# Patient Record
Sex: FEMALE | Race: BLACK OR AFRICAN AMERICAN | NOT HISPANIC OR LATINO | Employment: OTHER | URBAN - METROPOLITAN AREA
[De-identification: names, ages, dates, MRNs, and addresses within clinical notes are randomized per-mention and may not be internally consistent; named-entity substitution may affect disease eponyms.]

---

## 2017-01-19 ENCOUNTER — OFFICE VISIT (OUTPATIENT)
Dept: HEMATOLOGY/ONCOLOGY | Facility: CLINIC | Age: 77
End: 2017-01-19
Payer: MEDICARE

## 2017-01-19 VITALS
BODY MASS INDEX: 29.06 KG/M2 | HEART RATE: 75 BPM | WEIGHT: 170.19 LBS | DIASTOLIC BLOOD PRESSURE: 62 MMHG | TEMPERATURE: 101 F | SYSTOLIC BLOOD PRESSURE: 127 MMHG | HEIGHT: 64 IN | RESPIRATION RATE: 18 BRPM

## 2017-01-19 DIAGNOSIS — C50.311 MALIGNANT NEOPLASM OF LOWER-INNER QUADRANT OF RIGHT FEMALE BREAST: Primary | ICD-10-CM

## 2017-01-19 PROCEDURE — 99214 OFFICE O/P EST MOD 30 MIN: CPT | Mod: S$PBB,,, | Performed by: PHYSICIAN ASSISTANT

## 2017-01-19 PROCEDURE — 99213 OFFICE O/P EST LOW 20 MIN: CPT | Mod: PBBFAC | Performed by: PHYSICIAN ASSISTANT

## 2017-01-19 PROCEDURE — 99999 PR PBB SHADOW E&M-EST. PATIENT-LVL III: CPT | Mod: PBBFAC,,, | Performed by: PHYSICIAN ASSISTANT

## 2017-01-19 NOTE — PROGRESS NOTES
Subjective:       Patient ID: Whit Sloan is a 76 y.o. female.    Chief Complaint: No chief complaint on file.    HPI Comments: Dr. Salinas's patient, 76 year old female  right breast cancer    Stage IA (pT1c sn pN0(i-) pMX)    Started on adjuvant Tamoxifen 9/2016.    Patient feeling quite well. NO fever, chills, nausea, vomiting or shortness of breath. She has some very mild hot flashes that do not disturb quality of life. No breast pain or complaints.   Stays active with walking dog and household activities. Emotionally doing well and denies depression or anxiety.    Bilateral mammogram from 12/12/16:  Post-surgical scar in the right breast is benign-negative.  Mammogram in 1 year is recommended.  ACR BI-RADS Category 2: Benign        Oncology History:  - 4/6/15 underwent routine mammogram and a focal asymmetric density in the right breast requiring additional evaluation seen.  Breast MRI was initially recommended and read as BI-RADS Category 0: Incomplete needing additional imaging   This was addended on 04/09/2015 and upon further review, focal asymmetric density in the right breast was read ads probably  benign. Follow-up in 6 months is recommended.  - 10/22/15 she underwent follow-up diagnostic mammogram which revealed stable focal asymmetry in the right breast is benign-negative.  Follow-up in 6  months is recommended.    ACR BI-RADS Category 2: Benign  - On 5/9/16 she underwent breast ultrasound  Stable focal asymmetry in the right breast is suspicious.  Histology using core biopsy was recommended.  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      We reviewed Tamoxifen versus aromatase inhibitors and carefully reviewed mechanism of action and side effects for both classes of drugs  We will proceed with Tamoxifen per her preference  RTC 4-6 weeks - knows to call for any issues    Review of Systems   Constitutional: Negative.    HENT: Negative for congestion, dental problem, rhinorrhea, sore throat and trouble swallowing.    Eyes: Negative for visual disturbance.   Respiratory: Negative for cough, chest tightness and shortness of breath.    Cardiovascular: Negative for chest pain, palpitations and leg swelling.        Some ankle swelling at the end of the day   Gastrointestinal: Negative for abdominal pain, blood in stool, constipation, diarrhea, nausea and vomiting.   Genitourinary: Negative for dysuria, hematuria and vaginal bleeding.   Musculoskeletal: Negative for arthralgias, back pain and myalgias.   Skin: Negative for pallor and rash.   Neurological: Negative for dizziness, weakness and headaches.   Hematological: Negative for adenopathy. Does not bruise/bleed easily.   Psychiatric/Behavioral: Negative for dysphoric mood and suicidal ideas. The patient is not nervous/anxious.        Objective:      Physical Exam   Constitutional: She is oriented to person, place, and time. She appears well-developed and well-nourished. No distress.   ECOG 0   HENT:   Head: Normocephalic.   Mouth/Throat: Oropharynx is clear and moist. No oropharyngeal exudate.   Eyes: Conjunctivae are normal. Pupils are equal, round, and reactive to light. No scleral icterus.   Neck: Normal range of motion. Neck supple. No thyromegaly present.   Cardiovascular: Normal rate and regular rhythm.    Pulmonary/Chest: Effort normal and breath sounds normal. No respiratory distress.   Right breast with well healed lumpectomy incision. Some mild hyperpigmentation of the central breast due to radiation. No mass or nodule in either right or  left breast. No supraclavicular or axillary adenopathy.   Abdominal: Soft. Bowel sounds are normal. She exhibits no distension and no mass. There is no tenderness.   Musculoskeletal: Normal range of motion. She exhibits no edema or tenderness.   No spinal or paraspinal tenderness to palpation     Lymphadenopathy:     She has no cervical adenopathy.   Neurological: She is alert and oriented to person, place, and time. No cranial nerve deficit.   Skin: Skin is warm and dry.   Psychiatric: She has a normal mood and affect. Her behavior is normal. Judgment and thought content normal.   Vitals reviewed.      Assessment:       1. Malignant neoplasm of lower-inner quadrant of right female breast        Plan:       Patient will continue Tamoxifen and return to clinic in 3 months.    We discussed the role of the survivorship clinic in her care.  Today we reviewed all aspects of treatment and the potential long term side effects of treatment. We also discussed the emotional/psychological impact of diagnosis and treatment and I let her know about our psychosocial services (dedicated  and Psychologist).  She declines referral to these services at this time.       Annual mammogram due 12/2017    Written material on eating well, exercise, fear of recurrence, living with uncertainty and sadness depression provided to patient.     Please see Summary Treatment and Care plan filed under same date.      .

## 2017-01-19 NOTE — LETTER
January 19, 2017      Jennifer Salinas MD  [address]  [facility] [address]           [facility]  [address]  [facility] [address]  Phone: [phone]          Patient: Whit Sloan   MR Number: [id number]   YOB: 1940   Date of Visit: 1/19/2017       Dear Dr. Jennifer Salinas:    Thank you for referring Whit Sloan to me for evaluation. Attached you will find relevant portions of my assessment and plan of care.    If you have questions, please do not hesitate to call me. I look forward to following Whit Sloan along with you.    Sincerely,    Rosemary Beasley PA-C    Enclosure  CC:  No Recipients    If you would like to receive this communication electronically, please contact [email or web address] or [phone] to request more information on [employer] access.    For providers and/or their staff who would like to refer a patient to [facility], please contact us through our one-stop-shop provider referral line, [facility] , at [phone].    If you feel you have received this communication in error or would no longer like to receive these types of communications, please e-mail [email or web address]

## 2017-02-07 ENCOUNTER — LAB VISIT (OUTPATIENT)
Dept: LAB | Facility: HOSPITAL | Age: 77
End: 2017-02-07
Attending: FAMILY MEDICINE
Payer: MEDICARE

## 2017-02-07 DIAGNOSIS — E11.69 DM TYPE 2 WITH DIABETIC DYSLIPIDEMIA: ICD-10-CM

## 2017-02-07 DIAGNOSIS — E78.5 DM TYPE 2 WITH DIABETIC DYSLIPIDEMIA: ICD-10-CM

## 2017-02-07 LAB
ALBUMIN SERPL BCP-MCNC: 3.2 G/DL
ALP SERPL-CCNC: 60 U/L
ALT SERPL W/O P-5'-P-CCNC: 9 U/L
ANION GAP SERPL CALC-SCNC: 7 MMOL/L
AST SERPL-CCNC: 20 U/L
BILIRUB SERPL-MCNC: 0.5 MG/DL
BUN SERPL-MCNC: 37 MG/DL
CALCIUM SERPL-MCNC: 9 MG/DL
CHLORIDE SERPL-SCNC: 113 MMOL/L
CHOLEST/HDLC SERPL: 2.3 {RATIO}
CO2 SERPL-SCNC: 22 MMOL/L
CREAT SERPL-MCNC: 1.8 MG/DL
EST. GFR  (AFRICAN AMERICAN): 30.9 ML/MIN/1.73 M^2
EST. GFR  (NON AFRICAN AMERICAN): 26.8 ML/MIN/1.73 M^2
ESTIMATED AVG GLUCOSE: 151 MG/DL
GLUCOSE SERPL-MCNC: 73 MG/DL
HBA1C MFR BLD HPLC: 6.9 %
HDL/CHOLESTEROL RATIO: 43.1 %
HDLC SERPL-MCNC: 144 MG/DL
HDLC SERPL-MCNC: 62 MG/DL
LDLC SERPL CALC-MCNC: 72 MG/DL
NONHDLC SERPL-MCNC: 82 MG/DL
POTASSIUM SERPL-SCNC: 4.6 MMOL/L
PROT SERPL-MCNC: 7 G/DL
SODIUM SERPL-SCNC: 142 MMOL/L
TRIGL SERPL-MCNC: 50 MG/DL

## 2017-02-07 PROCEDURE — 80053 COMPREHEN METABOLIC PANEL: CPT

## 2017-02-07 PROCEDURE — 36415 COLL VENOUS BLD VENIPUNCTURE: CPT | Mod: PO

## 2017-02-07 PROCEDURE — 80061 LIPID PANEL: CPT

## 2017-02-07 PROCEDURE — 83036 HEMOGLOBIN GLYCOSYLATED A1C: CPT

## 2017-02-11 RX ORDER — SIMVASTATIN 40 MG/1
TABLET, FILM COATED ORAL
Qty: 30 TABLET | Refills: 0 | Status: SHIPPED | OUTPATIENT
Start: 2017-02-11 | End: 2017-03-14 | Stop reason: SDUPTHER

## 2017-02-27 ENCOUNTER — OFFICE VISIT (OUTPATIENT)
Dept: FAMILY MEDICINE | Facility: CLINIC | Age: 77
End: 2017-02-27
Payer: MEDICARE

## 2017-02-27 VITALS
BODY MASS INDEX: 29.06 KG/M2 | SYSTOLIC BLOOD PRESSURE: 110 MMHG | HEIGHT: 64 IN | WEIGHT: 170.19 LBS | DIASTOLIC BLOOD PRESSURE: 60 MMHG | HEART RATE: 64 BPM

## 2017-02-27 DIAGNOSIS — N18.30 HYPERTENSIVE KIDNEY DISEASE WITH CHRONIC KIDNEY DISEASE STAGE III: Primary | ICD-10-CM

## 2017-02-27 DIAGNOSIS — M85.80 OSTEOPENIA: ICD-10-CM

## 2017-02-27 DIAGNOSIS — C50.311 MALIGNANT NEOPLASM OF LOWER-INNER QUADRANT OF RIGHT FEMALE BREAST: ICD-10-CM

## 2017-02-27 DIAGNOSIS — F32.0 MILD SINGLE CURRENT EPISODE OF MAJOR DEPRESSIVE DISORDER: ICD-10-CM

## 2017-02-27 DIAGNOSIS — E11.22 TYPE 2 DIABETES MELLITUS WITH STAGE 3 CHRONIC KIDNEY DISEASE, WITHOUT LONG-TERM CURRENT USE OF INSULIN: ICD-10-CM

## 2017-02-27 DIAGNOSIS — I12.9 HYPERTENSIVE KIDNEY DISEASE WITH CHRONIC KIDNEY DISEASE STAGE III: Primary | ICD-10-CM

## 2017-02-27 DIAGNOSIS — N18.30 TYPE 2 DIABETES MELLITUS WITH STAGE 3 CHRONIC KIDNEY DISEASE, WITHOUT LONG-TERM CURRENT USE OF INSULIN: ICD-10-CM

## 2017-02-27 DIAGNOSIS — M89.9 DISORDER OF BONE: ICD-10-CM

## 2017-02-27 DIAGNOSIS — E11.69 DM TYPE 2 WITH DIABETIC DYSLIPIDEMIA: ICD-10-CM

## 2017-02-27 DIAGNOSIS — E78.5 DM TYPE 2 WITH DIABETIC DYSLIPIDEMIA: ICD-10-CM

## 2017-02-27 PROCEDURE — 99214 OFFICE O/P EST MOD 30 MIN: CPT | Mod: S$PBB,,, | Performed by: FAMILY MEDICINE

## 2017-02-27 PROCEDURE — 99999 PR PBB SHADOW E&M-EST. PATIENT-LVL III: CPT | Mod: PBBFAC,,, | Performed by: FAMILY MEDICINE

## 2017-02-27 PROCEDURE — 99213 OFFICE O/P EST LOW 20 MIN: CPT | Mod: PBBFAC,PO | Performed by: FAMILY MEDICINE

## 2017-02-27 RX ORDER — TAMOXIFEN CITRATE 20 MG/1
TABLET ORAL
Refills: 5 | COMMUNITY
Start: 2017-02-19 | End: 2017-02-27

## 2017-02-27 NOTE — MR AVS SNAPSHOT
[facility]   [address]  [place] [address]  Phone: [phone]  Fax: [phone]                  Whit Sloan   2017 2:20 PM   Office Visit    Description:  Female : 1940   Provider:  Ingrid Meyers MD   Department:  [facility]           Reason for Visit     Follow-up     Hypertension     Chronic Kidney Disease     Hyperlipidemia     Diabetes           Diagnoses this Visit        Comments    Hypertensive kidney disease with chronic kidney disease stage III    -  Primary     Type 2 diabetes mellitus with stage 3 chronic kidney disease, without long-term current use of insulin         DM type 2 with diabetic dyslipidemia         Malignant neoplasm of lower-inner quadrant of right female breast         Mild single current episode of major depressive disorder         BMI 29.0-29.9,adult         Osteopenia         Disorder of bone                To Do List           Future Appointments        Provider Department Dept Phone    3/13/2017 11:00 AM METC, DEXA1 [facility] [phone]    2017 8:00 AM JENN, KENNER [facility] [phone]    2017 2:20 PM Ingrid Meyers MD [facility] [phone]      Goals (5 Years of Data)     None      Follow-Up and Disposition     Return in about 4 months (around 2017).      [facility]     [facility] Nurse Care Line - 24/7 Assistance  Registered nurses in the [facility] provide clinical advisement, health education, appointment booking, and other advisory services.  Call for this free service at [phone].             Medications           STOP taking these medications     denosumab (PROLIA) 60 mg/mL Syrg Inject 60 mg into the skin every 6 (six) months.    latanoprost 0.005 % ophthalmic solution Place 1 drop into both eyes once daily.            Verify that the below list of medications is an accurate representation of the  medications you are currently taking.  If none reported, the list may be blank. If incorrect, please contact your healthcare provider. Carry this list with you in case of emergency.           Current Medications     calcitRIOL (ROCALTROL) 0.5 MCG Cap Take 0.5 mcg by mouth once daily.     fluoxetine (PROZAC) 10 MG capsule TAKE 1 CAPSULE BY MOUTH EVERY DAY    furosemide (LASIX) 20 MG tablet Take 20 mg by mouth once daily.      glipiZIDE (GLUCOTROL) 10 MG tablet Take 1 tablet (10 mg total) by mouth 2 (two) times daily before meals.    hydrocodone-acetaminophen 5-325mg (NORCO) 5-325 mg per tablet Take 1 tablet by mouth every 6 (six) hours as needed for Pain.    JANUVIA 50 mg Tab TAKE 1 TABLET BY MOUTH EVERY DAY    losartan (COZAAR) 100 MG tablet TAKE 1 TABLET BY MOUTH EVERY DAY    pioglitazone (ACTOS) 45 MG tablet TAKE 1 TABLET BY MOUTH EVERY DAY    potassium citrate (UROCIT-K) 10 mEq TbSR Take 10 mEq by mouth 2 (two) times daily with meals.     senna-docusate 8.6-50 mg (PERICOLACE) 8.6-50 mg per tablet Take 1 tablet by mouth 2 (two) times daily.    simvastatin (ZOCOR) 40 MG tablet TAKE 1 TABLET BY MOUTH EVERY EVENING    tamoxifen (NOLVADEX) 20 MG Tab Take 1 tablet (20 mg total) by mouth once daily.           Clinical Reference Information           Your Vitals Were     BP                   110/60           Blood Pressure          Most Recent Value    BP  110/60      Allergies as of 2/27/2017     No Known Allergies      Immunizations Administered on Date of Encounter - 2/27/2017     None      Orders Placed During Today's Visit     Future Labs/Procedures Expected by Expires    Comprehensive metabolic panel  2/27/2017 2/27/2018    DXA Bone Density Spine And Hip_Axial Skeleton  2/27/2017 5/28/2017    Hemoglobin A1c  2/27/2017 2/27/2018      [facility] Sign-Up     Activating your [facility] account is as easy as 1-2-3!     1) Visit [email or web address], select Sign Up Now, enter this activation code and your date of birth, then  select Next.  [email or web address]  Expires: 4/13/2017  3:05 PM      2) Create a username and password to use when you visit [facility] in the future and select a security question in case you lose your password and select Next.    3) Enter your e-mail address and click Sign Up!    Additional Information  If you have questions, please e-mail [employer] [email or web address] or call [phone] to talk to our [facility] staff. Remember, [facility] is NOT to be used for urgent needs. For medical emergencies, dial 911.         Language Assistance Services     ATTENTION: Language assistance services are available, free of charge. Please call [phone].      ATENCIÓN: Si mallorie andres, tiene a baxter disposición servicios gratuitos de asistencia lingüística. Llame al [phone].     GAURAV Ý: N?u b?n nói Ti?ng Vi?t, có các d?ch v? h? tr? ngôn ng? mi?n phí dành cho b?n. G?i s? [phone].         [facility] complies with applicable Federal civil rights laws and does not discriminate on the basis of race, color, national origin, age, disability, or sex.

## 2017-02-27 NOTE — PROGRESS NOTES
Subjective:       Patient ID: Whit Sloan is a 76 y.o. female.    Chief Complaint: Follow-up; Hypertension; Chronic Kidney Disease; Hyperlipidemia; and Diabetes    Hypertension   This is a chronic problem. The current episode started more than 1 year ago. The problem is unchanged. The problem is controlled. Associated symptoms include peripheral edema. Pertinent negatives include no blurred vision, chest pain, headaches, orthopnea, palpitations, PND or shortness of breath. Past treatments include angiotensin blockers and diuretics. The current treatment provides significant improvement. There are no compliance problems.  Hypertensive end-organ damage includes kidney disease. Identifiable causes of hypertension include chronic renal disease.   Hyperlipidemia   This is a chronic problem. The current episode started more than 1 year ago. The problem is controlled. Recent lipid tests were reviewed and are normal. Exacerbating diseases include chronic renal disease and diabetes. She has no history of hypothyroidism, liver disease, obesity or nephrotic syndrome. Pertinent negatives include no chest pain, focal sensory loss, focal weakness, leg pain, myalgias or shortness of breath. Current antihyperlipidemic treatment includes statins. The current treatment provides significant improvement of lipids. There are no compliance problems.    Diabetes   She presents for her follow-up diabetic visit. She has type 2 diabetes mellitus. There are no hypoglycemic associated symptoms. Pertinent negatives for hypoglycemia include no headaches or nervousness/anxiousness. Pertinent negatives for diabetes include no blurred vision, no chest pain, no fatigue, no foot paresthesias, no foot ulcerations, no polydipsia, no polyphagia, no polyuria, no visual change, no weakness and no weight loss. There are no hypoglycemic complications. Diabetic complications include nephropathy. Current diabetic treatment includes oral agent (triple  therapy). She is compliant with treatment all of the time. She participates in exercise three times a week. An ACE inhibitor/angiotensin II receptor blocker is being taken. Eye exam is current (Had eye exam Jan 2017 with Dr. Min.).   Ias followed by Dr. Sprague for CKD, Stage 3. Next visit April 2017.  Is followed by Dr. Min for glaucoma. Last visit Jan 2017. No diabetic retinopathy.  Continues on Tamoxifen for breast cancer.  Results for orders placed or performed in visit on 02/07/17   Comprehensive metabolic panel   Result Value Ref Range    Sodium 142 136 - 145 mmol/L    Potassium 4.6 3.5 - 5.1 mmol/L    Chloride 113 (H) 95 - 110 mmol/L    CO2 22 (L) 23 - 29 mmol/L    Glucose 73 70 - 110 mg/dL    BUN, Bld 37 (H) 8 - 23 mg/dL    Creatinine 1.8 (H) 0.5 - 1.4 mg/dL    Calcium 9.0 8.7 - 10.5 mg/dL    Total Protein 7.0 6.0 - 8.4 g/dL    Albumin 3.2 (L) 3.5 - 5.2 g/dL    Total Bilirubin 0.5 0.1 - 1.0 mg/dL    Alkaline Phosphatase 60 55 - 135 U/L    AST 20 10 - 40 U/L    ALT 9 (L) 10 - 44 U/L    Anion Gap 7 (L) 8 - 16 mmol/L    eGFR if African American 30.9 (A) >60 mL/min/1.73 m^2    eGFR if non  26.8 (A) >60 mL/min/1.73 m^2   Hemoglobin A1c   Result Value Ref Range    Hemoglobin A1C 6.9 (H) 4.5 - 6.2 %    Estimated Avg Glucose 151 (H) 68 - 131 mg/dL   Lipid panel   Result Value Ref Range    Cholesterol 144 120 - 199 mg/dL    Triglycerides 50 30 - 150 mg/dL    HDL 62 40 - 75 mg/dL    LDL Cholesterol 72.0 63.0 - 159.0 mg/dL    HDL/Chol Ratio 43.1 20.0 - 50.0 %    Total Cholesterol/HDL Ratio 2.3 2.0 - 5.0    Non-HDL Cholesterol 82 mg/dL     Review of Systems   Constitutional: Negative for chills, fatigue, fever and weight loss.   Eyes: Negative for blurred vision.   Respiratory: Negative for shortness of breath.    Cardiovascular: Positive for leg swelling. Negative for chest pain, palpitations, orthopnea and PND.   Gastrointestinal: Negative for abdominal pain, anal bleeding, blood in stool, constipation,  diarrhea, nausea and vomiting.   Endocrine: Negative for polydipsia, polyphagia and polyuria.   Genitourinary: Negative for dysuria and menstrual problem.   Musculoskeletal: Negative for myalgias.   Neurological: Negative for focal weakness, weakness and headaches.   Psychiatric/Behavioral: Negative for dysphoric mood, self-injury, sleep disturbance and suicidal ideas. The patient is not nervous/anxious.        Objective:      Physical Exam   Constitutional: She is oriented to person, place, and time. She appears well-developed and well-nourished.   HENT:   Head: Normocephalic and atraumatic.   Neck: Normal range of motion. Neck supple. No JVD present. No thyromegaly present.   Cardiovascular: Normal rate, regular rhythm, normal heart sounds and intact distal pulses.    Pulses:       Dorsalis pedis pulses are 2+ on the right side, and 2+ on the left side.        Posterior tibial pulses are 2+ on the right side, and 2+ on the left side.   Pulmonary/Chest: Effort normal and breath sounds normal. She has no wheezes. She has no rales.   Abdominal: Soft. Bowel sounds are normal. She exhibits no mass. There is no tenderness.   Musculoskeletal:        Right foot: There is deformity. There is normal range of motion.        Left foot: There is deformity. There is normal range of motion.   Feet:   Right Foot:   Protective Sensation: 10 sites tested. 10 sites sensed.   Skin Integrity: Negative for ulcer, blister, skin breakdown, erythema, warmth, callus or dry skin.   Left Foot:   Protective Sensation: 10 sites tested. 10 sites sensed.   Skin Integrity: Negative for ulcer, blister, skin breakdown, erythema, warmth, callus or dry skin.   Lymphadenopathy:     She has no cervical adenopathy.   Neurological: She is alert and oriented to person, place, and time.   Skin: Skin is warm and dry.   Psychiatric: She has a normal mood and affect.   Vitals reviewed.      Assessment:         See plan  Plan:       Whit was seen today for  follow-up, hypertension, chronic kidney disease, hyperlipidemia and diabetes.    Diagnoses and all orders for this visit:    Hypertensive kidney disease with chronic kidney disease stage III: Controlled  - F/U with Dr. Sprague    Type 2 diabetes mellitus with stage 3 chronic kidney disease, without long-term current use of insulin: Controlled  -     Comprehensive metabolic panel; Future  -     Hemoglobin A1c; Future    DM type 2 with diabetic dyslipidemia: Controlled  -     Comprehensive metabolic panel; Future  -     Hemoglobin A1c; Future    Malignant neoplasm of lower-inner quadrant of right female breast  - Continue Tamoxifen    Mild single current episode of major depressive disorder: Controlled    BMI 29.0-29.9,adult  Weight loss advised. Dietary and exercise counseling done.    Osteopenia  -     DXA Bone Density Spine And Hip_Axial Skeleton; Future    Disorder of bone   -     DXA Bone Density Spine And Hip_Axial Skeleton; Future    F/U in 4 months.

## 2017-03-13 ENCOUNTER — APPOINTMENT (OUTPATIENT)
Dept: RADIOLOGY | Facility: CLINIC | Age: 77
End: 2017-03-13
Attending: FAMILY MEDICINE
Payer: MEDICARE

## 2017-03-13 DIAGNOSIS — M85.80 OSTEOPENIA: ICD-10-CM

## 2017-03-13 DIAGNOSIS — M89.9 DISORDER OF BONE: ICD-10-CM

## 2017-03-13 PROCEDURE — 77080 DXA BONE DENSITY AXIAL: CPT | Mod: 26,,, | Performed by: INTERNAL MEDICINE

## 2017-03-13 PROCEDURE — 77080 DXA BONE DENSITY AXIAL: CPT | Mod: TC

## 2017-03-14 RX ORDER — SIMVASTATIN 40 MG/1
TABLET, FILM COATED ORAL
Qty: 90 TABLET | Refills: 1 | Status: SHIPPED | OUTPATIENT
Start: 2017-03-14 | End: 2017-10-06 | Stop reason: SDUPTHER

## 2017-03-30 ENCOUNTER — TELEPHONE (OUTPATIENT)
Dept: ADMINISTRATIVE | Facility: OTHER | Age: 77
End: 2017-03-30

## 2017-03-30 NOTE — TELEPHONE ENCOUNTER
----- Message from Christelle Monique sent at 3/29/2017  2:14 PM CDT -----  Contact: Self  Pt needs f/u appt with Dr. Salinas. Please call pt back at [phone] and also send a letter.

## 2017-04-12 ENCOUNTER — DOCUMENTATION ONLY (OUTPATIENT)
Dept: FAMILY MEDICINE | Facility: CLINIC | Age: 77
End: 2017-04-12

## 2017-04-12 NOTE — PROGRESS NOTES
Labs received from Dr. Angie Sprague (nephrology) dated 4/4/17. BUN/Cr 39/1.78, eGFR 31  Tot chol 153, HDL 72, Tri 47, LDL 72.  HgbA1C 6.7%

## 2017-04-27 ENCOUNTER — OFFICE VISIT (OUTPATIENT)
Dept: HEMATOLOGY/ONCOLOGY | Facility: CLINIC | Age: 77
End: 2017-04-27
Payer: MEDICARE

## 2017-04-27 ENCOUNTER — LAB VISIT (OUTPATIENT)
Dept: LAB | Facility: HOSPITAL | Age: 77
End: 2017-04-27
Attending: INTERNAL MEDICINE
Payer: MEDICARE

## 2017-04-27 VITALS
RESPIRATION RATE: 20 BRPM | DIASTOLIC BLOOD PRESSURE: 65 MMHG | TEMPERATURE: 98 F | BODY MASS INDEX: 29.37 KG/M2 | OXYGEN SATURATION: 100 % | WEIGHT: 171.06 LBS | HEART RATE: 73 BPM | SYSTOLIC BLOOD PRESSURE: 151 MMHG

## 2017-04-27 DIAGNOSIS — C50.311 MALIGNANT NEOPLASM OF LOWER-INNER QUADRANT OF RIGHT FEMALE BREAST: Primary | ICD-10-CM

## 2017-04-27 DIAGNOSIS — C50.311 MALIGNANT NEOPLASM OF LOWER-INNER QUADRANT OF RIGHT FEMALE BREAST: ICD-10-CM

## 2017-04-27 LAB
ALBUMIN SERPL BCP-MCNC: 3.3 G/DL
ALP SERPL-CCNC: 73 U/L
ALT SERPL W/O P-5'-P-CCNC: 9 U/L
ANION GAP SERPL CALC-SCNC: 8 MMOL/L
AST SERPL-CCNC: 19 U/L
BILIRUB SERPL-MCNC: 0.6 MG/DL
BUN SERPL-MCNC: 35 MG/DL
CALCIUM SERPL-MCNC: 9.4 MG/DL
CHLORIDE SERPL-SCNC: 108 MMOL/L
CO2 SERPL-SCNC: 23 MMOL/L
CREAT SERPL-MCNC: 2.1 MG/DL
ERYTHROCYTE [DISTWIDTH] IN BLOOD BY AUTOMATED COUNT: 13.5 %
EST. GFR  (AFRICAN AMERICAN): 25.6 ML/MIN/1.73 M^2
EST. GFR  (NON AFRICAN AMERICAN): 22.2 ML/MIN/1.73 M^2
GLUCOSE SERPL-MCNC: 183 MG/DL
HCT VFR BLD AUTO: 35.7 %
HGB BLD-MCNC: 11.4 G/DL
MCH RBC QN AUTO: 27.7 PG
MCHC RBC AUTO-ENTMCNC: 31.9 %
MCV RBC AUTO: 87 FL
NEUTROPHILS # BLD AUTO: 2.5 K/UL
PLATELET # BLD AUTO: 159 K/UL
PMV BLD AUTO: 10.7 FL
POTASSIUM SERPL-SCNC: 5.3 MMOL/L
PROT SERPL-MCNC: 7.2 G/DL
RBC # BLD AUTO: 4.11 M/UL
SODIUM SERPL-SCNC: 139 MMOL/L
WBC # BLD AUTO: 4.07 K/UL

## 2017-04-27 PROCEDURE — 80053 COMPREHEN METABOLIC PANEL: CPT

## 2017-04-27 PROCEDURE — 36415 COLL VENOUS BLD VENIPUNCTURE: CPT

## 2017-04-27 PROCEDURE — 99214 OFFICE O/P EST MOD 30 MIN: CPT | Mod: S$PBB,,, | Performed by: INTERNAL MEDICINE

## 2017-04-27 PROCEDURE — 85027 COMPLETE CBC AUTOMATED: CPT

## 2017-04-27 PROCEDURE — 99999 PR PBB SHADOW E&M-EST. PATIENT-LVL III: CPT | Mod: PBBFAC,,, | Performed by: INTERNAL MEDICINE

## 2017-04-27 NOTE — PROGRESS NOTES
Subjective:       Patient ID: Whit Sloan is a 76 y.o. female.    Chief Complaint: Follow-up    HPI     This is a 77 yo female who presents for follow-up on Tamoxifen.  Reports x a few weeks noting right breast has a sensation of sticking feeling and heaviness  No other pain or discomfort  Weight stable      Oncology History:  - 4/6/15 underwent routine mammogram and a focal asymmetric density in the right breast requiring additional evaluation seen.  Breast MRI was initially recommended and read as BI-RADS Category 0: Incomplete needing additional imaging   This was addended on 04/09/2015 and upon further review, focal asymmetric density in the right breast was read ads probably  benign. Follow-up in 6 months is recommended.  - 10/22/15 she underwent follow-up diagnostic mammogram which revealed stable focal asymmetry in the right breast is benign-negative.  Follow-up in 6  months is recommended.    ACR BI-RADS Category 2: Benign  - On 5/9/16 she underwent breast ultrasound  Stable focal asymmetry in the right breast is suspicious.  Histology using core biopsy was recommended.  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   HENT: Positive for hearing loss (left > right). Negative for congestion, dental problem, ear pain, mouth sores, nosebleeds, postnasal drip, rhinorrhea, sinus pressure, sore throat, tinnitus and trouble swallowing.    Eyes: Negative for redness and visual disturbance (sees optho every 6 months).    Respiratory: Negative for cough, chest tightness, shortness of breath and wheezing.    Cardiovascular: Negative for chest pain, palpitations and leg swelling (chronic at ankles at end of day).   Gastrointestinal: Negative for abdominal distention, abdominal pain, blood in stool, constipation, diarrhea, nausea and vomiting.   Endocrine: Negative for cold intolerance and heat intolerance.   Genitourinary: Negative for decreased urine volume, difficulty urinating, dysuria, frequency, hematuria and urgency.   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 Negative for arthralgias, back pain, gait problem and joint swelling.   Skin: Negative for color change, pallor, rash and wound.        Surgical sites healing well   Neurological: Negative for dizziness, weakness, light-headedness, numbness and headaches.   Hematological: Negative for adenopathy. Does not bruise/bleed easily.   Psychiatric/Behavioral: Negative for confusion, dysphoric mood (hx depression) and sleep disturbance. The patient is not nervous/anxious.        Objective:      Physical Exam   Constitutional: She is oriented to person, place, and time. She appears well-developed and well-nourished. No distress.   Presents alone   HENT:   Head: Normocephalic and atraumatic.   Right Ear: External ear normal.   Left Ear: External ear normal.   Mouth/Throat: Oropharynx is clear and moist. No oropharyngeal exudate.   Eyes: Conjunctivae and EOM are normal. Pupils are equal, round, and reactive to light. Right eye exhibits no discharge. Left eye exhibits no discharge. No scleral icterus. Right pupil is round and reactive. Left pupil is round and reactive.   Neck: Normal range of motion. Neck supple. No JVD present. No tracheal deviation present. No thyromegaly present.   Cardiovascular: Normal rate, regular rhythm, normal heart sounds and intact distal pulses.  Exam reveals no gallop and no friction rub.    No murmur heard.  Pulmonary/Chest:  Effort normal and breath sounds normal. No respiratory distress. She has no wheezes. She has no rales. She exhibits no tenderness.   Breasts- no masses, no irregularities, no LAD on left  Right breast- lymphedema changes and fullness left lower quadrant   Abdominal: Soft. Bowel sounds are normal. She exhibits no distension and no mass. There is no hepatosplenomegaly. There is no tenderness. There is no rebound and no guarding.   No organomegaly   Musculoskeletal: Normal range of motion. She exhibits no edema or tenderness.   Lymphadenopathy:        Head (right side): No submandibular adenopathy present.        Head (left side): No submandibular adenopathy present.     She has no cervical adenopathy.        Right cervical: No superficial cervical, no deep cervical and no posterior cervical adenopathy present.       Left cervical: No superficial cervical, no deep cervical and no posterior cervical adenopathy present.        Right: No inguinal and no supraclavicular adenopathy present.        Left: No inguinal and no supraclavicular adenopathy present.   Neurological: She is alert and oriented to person, place, and time. She has normal strength. No cranial nerve deficit or sensory deficit. She exhibits normal muscle tone. Coordination normal.   Skin: Skin is warm and dry. No bruising, no lesion, no petechiae and no rash noted. She is not diaphoretic. No erythema. No pallor.   Psychiatric: She has a normal mood and affect. Her behavior is normal. Judgment and thought content normal. Her mood appears not anxious. She does not exhibit a depressed mood.   Nursing note and vitals reviewed.    Labs- reviewed    Assessment:       1. Malignant neoplasm of lower-inner quadrant of right female breast        Plan:     1. Repeat mammogram  Refer for lymphedema therapy  RTC post

## 2017-04-28 DIAGNOSIS — C50.211 MALIGNANT NEOPLASM OF UPPER-INNER QUADRANT OF RIGHT FEMALE BREAST: ICD-10-CM

## 2017-04-28 DIAGNOSIS — R92.1 BREAST CALCIFICATION, RIGHT: Primary | ICD-10-CM

## 2017-05-30 DIAGNOSIS — E11.59 HYPERTENSION ASSOCIATED WITH DIABETES: ICD-10-CM

## 2017-05-30 DIAGNOSIS — E78.5 DM TYPE 2 WITH DIABETIC DYSLIPIDEMIA: ICD-10-CM

## 2017-05-30 DIAGNOSIS — I15.2 HYPERTENSION ASSOCIATED WITH DIABETES: ICD-10-CM

## 2017-05-30 DIAGNOSIS — E11.69 DM TYPE 2 WITH DIABETIC DYSLIPIDEMIA: ICD-10-CM

## 2017-05-30 RX ORDER — GLIPIZIDE 10 MG/1
TABLET ORAL
Qty: 180 TABLET | Refills: 0 | Status: SHIPPED | OUTPATIENT
Start: 2017-05-30 | End: 2017-06-28 | Stop reason: SDUPTHER

## 2017-05-30 RX ORDER — LOSARTAN POTASSIUM 100 MG/1
TABLET ORAL
Qty: 90 TABLET | Refills: 0 | Status: SHIPPED | OUTPATIENT
Start: 2017-05-30 | End: 2017-09-11 | Stop reason: SDUPTHER

## 2017-06-09 DIAGNOSIS — N18.9 CHRONIC KIDNEY DISEASE, UNSPECIFIED STAGE: ICD-10-CM

## 2017-06-26 ENCOUNTER — LAB VISIT (OUTPATIENT)
Dept: LAB | Facility: HOSPITAL | Age: 77
End: 2017-06-26
Attending: FAMILY MEDICINE
Payer: MEDICARE

## 2017-06-26 DIAGNOSIS — E78.5 DM TYPE 2 WITH DIABETIC DYSLIPIDEMIA: ICD-10-CM

## 2017-06-26 DIAGNOSIS — E11.22 TYPE 2 DIABETES MELLITUS WITH STAGE 3 CHRONIC KIDNEY DISEASE, WITHOUT LONG-TERM CURRENT USE OF INSULIN: ICD-10-CM

## 2017-06-26 DIAGNOSIS — E11.69 DM TYPE 2 WITH DIABETIC DYSLIPIDEMIA: ICD-10-CM

## 2017-06-26 DIAGNOSIS — N18.30 TYPE 2 DIABETES MELLITUS WITH STAGE 3 CHRONIC KIDNEY DISEASE, WITHOUT LONG-TERM CURRENT USE OF INSULIN: ICD-10-CM

## 2017-06-26 LAB
ALBUMIN SERPL BCP-MCNC: 3.1 G/DL
ALP SERPL-CCNC: 49 U/L
ALT SERPL W/O P-5'-P-CCNC: 7 U/L
ANION GAP SERPL CALC-SCNC: 6 MMOL/L
AST SERPL-CCNC: 18 U/L
BILIRUB SERPL-MCNC: 0.5 MG/DL
BUN SERPL-MCNC: 40 MG/DL
CALCIUM SERPL-MCNC: 9 MG/DL
CHLORIDE SERPL-SCNC: 111 MMOL/L
CO2 SERPL-SCNC: 25 MMOL/L
CREAT SERPL-MCNC: 2.1 MG/DL
EST. GFR  (AFRICAN AMERICAN): 25.6 ML/MIN/1.73 M^2
EST. GFR  (NON AFRICAN AMERICAN): 22.2 ML/MIN/1.73 M^2
ESTIMATED AVG GLUCOSE: 151 MG/DL
GLUCOSE SERPL-MCNC: 81 MG/DL
HBA1C MFR BLD HPLC: 6.9 %
POTASSIUM SERPL-SCNC: 5 MMOL/L
PROT SERPL-MCNC: 6.5 G/DL
SODIUM SERPL-SCNC: 142 MMOL/L

## 2017-06-26 PROCEDURE — 83036 HEMOGLOBIN GLYCOSYLATED A1C: CPT

## 2017-06-26 PROCEDURE — 80053 COMPREHEN METABOLIC PANEL: CPT

## 2017-06-26 PROCEDURE — 36415 COLL VENOUS BLD VENIPUNCTURE: CPT | Mod: PO

## 2017-06-28 ENCOUNTER — OFFICE VISIT (OUTPATIENT)
Dept: FAMILY MEDICINE | Facility: CLINIC | Age: 77
End: 2017-06-28
Payer: MEDICARE

## 2017-06-28 VITALS
WEIGHT: 169.06 LBS | HEART RATE: 73 BPM | HEIGHT: 64 IN | BODY MASS INDEX: 28.86 KG/M2 | DIASTOLIC BLOOD PRESSURE: 48 MMHG | OXYGEN SATURATION: 98 % | SYSTOLIC BLOOD PRESSURE: 100 MMHG

## 2017-06-28 DIAGNOSIS — E11.69 DM TYPE 2 WITH DIABETIC DYSLIPIDEMIA: ICD-10-CM

## 2017-06-28 DIAGNOSIS — I12.9 HYPERTENSIVE KIDNEY DISEASE WITH CHRONIC KIDNEY DISEASE STAGE III: Primary | ICD-10-CM

## 2017-06-28 DIAGNOSIS — E78.5 DM TYPE 2 WITH DIABETIC DYSLIPIDEMIA: ICD-10-CM

## 2017-06-28 DIAGNOSIS — Z79.810 USE OF TAMOXIFEN (NOLVADEX): ICD-10-CM

## 2017-06-28 DIAGNOSIS — C50.311 MALIGNANT NEOPLASM OF LOWER-INNER QUADRANT OF RIGHT FEMALE BREAST: ICD-10-CM

## 2017-06-28 DIAGNOSIS — Z23 NEED FOR 23-POLYVALENT PNEUMOCOCCAL POLYSACCHARIDE VACCINE: ICD-10-CM

## 2017-06-28 DIAGNOSIS — N18.30 TYPE 2 DIABETES MELLITUS WITH STAGE 3 CHRONIC KIDNEY DISEASE, WITHOUT LONG-TERM CURRENT USE OF INSULIN: ICD-10-CM

## 2017-06-28 DIAGNOSIS — N18.30 HYPERTENSIVE KIDNEY DISEASE WITH CHRONIC KIDNEY DISEASE STAGE III: Primary | ICD-10-CM

## 2017-06-28 DIAGNOSIS — E11.22 TYPE 2 DIABETES MELLITUS WITH STAGE 3 CHRONIC KIDNEY DISEASE, WITHOUT LONG-TERM CURRENT USE OF INSULIN: ICD-10-CM

## 2017-06-28 DIAGNOSIS — F32.0 MILD SINGLE CURRENT EPISODE OF MAJOR DEPRESSIVE DISORDER: ICD-10-CM

## 2017-06-28 PROCEDURE — 90732 PPSV23 VACC 2 YRS+ SUBQ/IM: CPT | Mod: PBBFAC,PO

## 2017-06-28 PROCEDURE — 99214 OFFICE O/P EST MOD 30 MIN: CPT | Mod: S$PBB,,, | Performed by: FAMILY MEDICINE

## 2017-06-28 PROCEDURE — 99213 OFFICE O/P EST LOW 20 MIN: CPT | Mod: PBBFAC,PO,25 | Performed by: FAMILY MEDICINE

## 2017-06-28 PROCEDURE — 99999 PR PBB SHADOW E&M-EST. PATIENT-LVL III: CPT | Mod: PBBFAC,,, | Performed by: FAMILY MEDICINE

## 2017-06-28 PROCEDURE — 1126F AMNT PAIN NOTED NONE PRSNT: CPT | Mod: ,,, | Performed by: FAMILY MEDICINE

## 2017-06-28 PROCEDURE — 1159F MED LIST DOCD IN RCRD: CPT | Mod: ,,, | Performed by: FAMILY MEDICINE

## 2017-06-28 RX ORDER — CALCITRIOL 0.25 UG/1
CAPSULE ORAL
Refills: 11 | COMMUNITY
Start: 2017-06-08 | End: 2018-09-12 | Stop reason: DRUGHIGH

## 2017-06-28 RX ORDER — GLIPIZIDE 10 MG/1
TABLET ORAL
Qty: 180 TABLET | Refills: 1 | Status: SHIPPED | OUTPATIENT
Start: 2017-06-28 | End: 2018-01-24 | Stop reason: SDUPTHER

## 2017-06-28 RX ORDER — TIMOLOL MALEATE 2.5 MG/ML
SOLUTION OPHTHALMIC
Refills: 0 | COMMUNITY
Start: 2017-05-24 | End: 2020-02-06

## 2017-06-28 NOTE — PROGRESS NOTES
Subjective:       Patient ID: Whit Sloan is a 76 y.o. female.    Chief Complaint: Follow-up (4 mos); Hypertension; Diabetes; Hyperlipidemia; and Chronic Kidney Disease    Hypertension   This is a chronic problem. The current episode started more than 1 year ago. The problem is unchanged. The problem is controlled. Associated symptoms include peripheral edema. Pertinent negatives include no blurred vision, chest pain, headaches, orthopnea, PND or shortness of breath. Past treatments include angiotensin blockers and diuretics. The current treatment provides significant improvement. There are no compliance problems.  Hypertensive end-organ damage includes kidney disease. Identifiable causes of hypertension include chronic renal disease.   Diabetes   She presents for her follow-up diabetic visit. She has type 2 diabetes mellitus. There are no hypoglycemic associated symptoms. Pertinent negatives for hypoglycemia include no headaches. Pertinent negatives for diabetes include no blurred vision, no chest pain, no fatigue, no foot paresthesias, no foot ulcerations, no polydipsia, no polyphagia, no polyuria, no visual change, no weakness and no weight loss. There are no hypoglycemic complications. Current diabetic treatment includes oral agent (triple therapy). She is compliant with treatment all of the time. Her weight is stable. She is following a diabetic diet. She participates in exercise intermittently. An ACE inhibitor/angiotensin II receptor blocker is being taken. Eye exam is current.   Hyperlipidemia   This is a chronic problem. The current episode started more than 1 year ago. The problem is controlled. Recent lipid tests were reviewed and are normal. Exacerbating diseases include chronic renal disease and diabetes. She has no history of hypothyroidism, liver disease, obesity or nephrotic syndrome. Pertinent negatives include no chest pain, focal sensory loss, focal weakness, leg pain, myalgias or shortness  of breath. Current antihyperlipidemic treatment includes statins. The current treatment provides significant improvement of lipids. There are no compliance problems.    To see Dr. Sprague in August 2017 for f/u CKD, Stage 3.  Pt is on Tamoxifen for left breast CA and is followed by Dr. Salinas.  Pt notes that she she is stressed because of caring for an ill partner, ill daughter. She also has been caring for a grandchild and great grandchild. She is not taking time for pleasant activities like going to the movies or having lunch with family or friends.  Results for orders placed or performed in visit on 06/26/17   Comprehensive metabolic panel   Result Value Ref Range    Sodium 142 136 - 145 mmol/L    Potassium 5.0 3.5 - 5.1 mmol/L    Chloride 111 (H) 95 - 110 mmol/L    CO2 25 23 - 29 mmol/L    Glucose 81 70 - 110 mg/dL    BUN, Bld 40 (H) 8 - 23 mg/dL    Creatinine 2.1 (H) 0.5 - 1.4 mg/dL    Calcium 9.0 8.7 - 10.5 mg/dL    Total Protein 6.5 6.0 - 8.4 g/dL    Albumin 3.1 (L) 3.5 - 5.2 g/dL    Total Bilirubin 0.5 0.1 - 1.0 mg/dL    Alkaline Phosphatase 49 (L) 55 - 135 U/L    AST 18 10 - 40 U/L    ALT 7 (L) 10 - 44 U/L    Anion Gap 6 (L) 8 - 16 mmol/L    eGFR if African American 25.6 (A) >60 mL/min/1.73 m^2    eGFR if non  22.2 (A) >60 mL/min/1.73 m^2   Hemoglobin A1c   Result Value Ref Range    Hemoglobin A1C 6.9 (H) 4.0 - 5.6 %    Estimated Avg Glucose 151 (H) 68 - 131 mg/dL     Review of Systems   Constitutional: Negative for fatigue and weight loss.   Eyes: Negative for blurred vision.   Respiratory: Negative for shortness of breath.    Cardiovascular: Negative for chest pain, orthopnea and PND.   Endocrine: Negative for polydipsia, polyphagia and polyuria.   Musculoskeletal: Negative for myalgias.   Neurological: Negative for focal weakness, weakness and headaches.       Objective:      Physical Exam   Constitutional: She is oriented to person, place, and time. She appears well-developed and  well-nourished.   Crying female.   HENT:   Head: Normocephalic and atraumatic.   Neck: Normal range of motion. Neck supple. No JVD present. No thyromegaly present.   Cardiovascular: Normal rate, regular rhythm, normal heart sounds and intact distal pulses.    Pulmonary/Chest: Effort normal and breath sounds normal. She has no wheezes. She has no rales.   Abdominal: Soft. Bowel sounds are normal. She exhibits no mass. There is no tenderness.   Lymphadenopathy:     She has no cervical adenopathy.   Neurological: She is alert and oriented to person, place, and time.   Skin: Skin is warm and dry.   Psychiatric: Her speech is normal and behavior is normal. She is not actively hallucinating. She exhibits a depressed mood. She is attentive.   Vitals reviewed.      Assessment:         See plan  Plan:       Whit was seen today for follow-up, hypertension, diabetes, hyperlipidemia and chronic kidney disease.    Diagnoses and all orders for this visit:    Hypertensive kidney disease with chronic kidney disease stage III  - Will send recent labs to Dr. Angie Sprague due to decreasing kidney function.      Type 2 diabetes mellitus with stage 3 chronic kidney disease, without long-term current use of insulin: Stable    DM type 2 with diabetic dyslipidemia: Controlled  -     glipiZIDE (GLUCOTROL) 10 MG tablet; TAKE 1 TABLET(10 MG) BY MOUTH TWICE DAILY BEFORE MEALS  -     Lipid panel; Future  -     Comprehensive metabolic panel; Future  -     Hemoglobin A1c; Future    Mild single current episode of major depressive disorder  - Continue Fluoxetine 10 mg daily. Pt advised to do a pleasant activity for herself on a daily basis.    BMI 29.0-29.9,adult  Weight loss advised. Dietary and exercise counseling done.    Malignant neoplasm of lower-inner quadrant of right female breast  - Continue f/u with Oncology    Use of tamoxifen (Nolvadex)  - Continue f/u with Oncology      Need for 23-polyvalent pneumococcal polysaccharide vaccine  -      (In Office Administered) Pneumococcal Polysaccharide Vaccine (23 Valent) (SQ/IM)    F/U in 3 months.

## 2017-07-03 DIAGNOSIS — E78.5 DM TYPE 2 WITH DIABETIC DYSLIPIDEMIA: ICD-10-CM

## 2017-07-03 DIAGNOSIS — E11.22 DIABETES MELLITUS WITH STAGE 3 CHRONIC KIDNEY DISEASE: ICD-10-CM

## 2017-07-03 DIAGNOSIS — N18.30 DIABETES MELLITUS WITH STAGE 3 CHRONIC KIDNEY DISEASE: ICD-10-CM

## 2017-07-03 DIAGNOSIS — E11.69 DM TYPE 2 WITH DIABETIC DYSLIPIDEMIA: ICD-10-CM

## 2017-07-03 RX ORDER — PIOGLITAZONEHYDROCHLORIDE 45 MG/1
TABLET ORAL
Qty: 90 TABLET | Refills: 0 | Status: SHIPPED | OUTPATIENT
Start: 2017-07-03 | End: 2017-10-12 | Stop reason: SDUPTHER

## 2017-07-03 RX ORDER — SITAGLIPTIN 50 MG/1
TABLET, FILM COATED ORAL
Qty: 90 TABLET | Refills: 0 | Status: SHIPPED | OUTPATIENT
Start: 2017-07-03 | End: 2017-09-29 | Stop reason: SDUPTHER

## 2017-07-31 ENCOUNTER — OFFICE VISIT (OUTPATIENT)
Dept: HEMATOLOGY/ONCOLOGY | Facility: CLINIC | Age: 77
End: 2017-07-31
Payer: MEDICARE

## 2017-07-31 ENCOUNTER — HOSPITAL ENCOUNTER (OUTPATIENT)
Dept: RADIOLOGY | Facility: HOSPITAL | Age: 77
Discharge: HOME | End: 2017-07-31
Attending: INTERNAL MEDICINE
Payer: MEDICARE

## 2017-07-31 VITALS
DIASTOLIC BLOOD PRESSURE: 67 MMHG | OXYGEN SATURATION: 99 % | TEMPERATURE: 98 F | SYSTOLIC BLOOD PRESSURE: 149 MMHG | HEART RATE: 69 BPM | RESPIRATION RATE: 18 BRPM | HEIGHT: 64 IN | WEIGHT: 167 LBS | BODY MASS INDEX: 28.51 KG/M2

## 2017-07-31 DIAGNOSIS — Z17.0 MALIGNANT NEOPLASM OF LOWER-INNER QUADRANT OF RIGHT BREAST OF FEMALE, ESTROGEN RECEPTOR POSITIVE: Primary | ICD-10-CM

## 2017-07-31 DIAGNOSIS — C50.311 MALIGNANT NEOPLASM OF LOWER-INNER QUADRANT OF RIGHT BREAST OF FEMALE, ESTROGEN RECEPTOR POSITIVE: Primary | ICD-10-CM

## 2017-07-31 DIAGNOSIS — C50.211 MALIGNANT NEOPLASM OF UPPER-INNER QUADRANT OF RIGHT FEMALE BREAST: ICD-10-CM

## 2017-07-31 PROCEDURE — 1159F MED LIST DOCD IN RCRD: CPT | Mod: ,,, | Performed by: PHYSICIAN ASSISTANT

## 2017-07-31 PROCEDURE — 1126F AMNT PAIN NOTED NONE PRSNT: CPT | Mod: ,,, | Performed by: PHYSICIAN ASSISTANT

## 2017-07-31 PROCEDURE — 99214 OFFICE O/P EST MOD 30 MIN: CPT | Mod: PBBFAC | Performed by: PHYSICIAN ASSISTANT

## 2017-07-31 PROCEDURE — 99213 OFFICE O/P EST LOW 20 MIN: CPT | Mod: S$PBB,,, | Performed by: PHYSICIAN ASSISTANT

## 2017-07-31 PROCEDURE — 99999 PR PBB SHADOW E&M-EST. PATIENT-LVL IV: CPT | Mod: PBBFAC,,, | Performed by: PHYSICIAN ASSISTANT

## 2017-07-31 NOTE — PROGRESS NOTES
Subjective:       Patient ID: Whit Sloan is a 76 y.o. female.    Chief Complaint: No chief complaint on file.    HPI      This is a 75 yo female who presents for follow-up on Tamoxifen.   Reports some very mild fullness at right breast.   No other pain or discomfort  Weight stable.  NO shortness of breath. No headaches. No fever or chills.   Followed by Angie Sprague (Nephrologist)  for CKD    Bilateral mammogram from 12/12/16:  Impression   Post-surgical scar in the right breast is benign-negative.  Mammogram in 1 year is recommended.  ACR BI-RADS Category 2: Benign          Oncology History:  - 4/6/15 underwent routine mammogram and a focal asymmetric density in the right breast requiring additional evaluation seen.  Breast MRI was initially recommended and read as BI-RADS Category 0: Incomplete needing additional imaging   This was addended on 04/09/2015 and upon further review, focal asymmetric density in the right breast was read ads probably  benign. Follow-up in 6 months is recommended.  - 10/22/15 she underwent follow-up diagnostic mammogram which revealed stable focal asymmetry in the right breast is benign-negative.  Follow-up in 6  months is recommended.    ACR BI-RADS Category 2: Benign  - On 5/9/16 she underwent breast ultrasound  Stable focal asymmetry in the right breast is suspicious.  Histology using core biopsy was recommended.  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    HENT: Negative for congestion, rhinorrhea, sore throat and trouble swallowing.    Eyes: Negative for visual disturbance.    Respiratory: Negative for cough, chest tightness and shortness of breath.    Cardiovascular: Negative for chest pain, palpitations and leg swelling.   Gastrointestinal: Negative for abdominal pain, blood in stool, constipation, diarrhea, nausea and vomiting.   Genitourinary: Negative for dysuria, hematuria and vaginal bleeding.   Musculoskeletal: Negative for arthralgias, back pain and myalgias.   Skin: Negative for pallor and rash.   Neurological: Negative for dizziness, weakness and headaches.   Hematological: Negative for adenopathy. Does not bruise/bleed easily.   Psychiatric/Behavioral: Negative for dysphoric mood and suicidal ideas. The patient is not nervous/anxious.        Objective:      Physical Exam   Constitutional: She is oriented to person, place, and time. She appears well-developed and well-nourished. No distress.   HENT:   Head: Normocephalic.   Mouth/Throat: Oropharynx is clear and moist. No oropharyngeal exudate.   Eyes: Conjunctivae are normal. Pupils are equal, round, and reactive to light. No scleral icterus.   Neck: Normal range of motion. Neck supple. No thyromegaly present.   Cardiovascular: Normal rate and regular rhythm.    Pulmonary/Chest: Effort normal and breath sounds normal. No respiratory distress.   Right left with well healed lumpectomy incision and some generalized fibrosis at lateral aspect of breast secondary to radiation.No mass, nodule or skin changes. Left breast without mass, nodule or skin changes. No axillary or supraclavicular adenopathy.    Abdominal: Soft. Bowel sounds are normal. She exhibits no distension and no mass. There is no tenderness.   Musculoskeletal: Normal range of motion. She exhibits no edema or tenderness.   No spinal or paraspinal tenderness to palpation     Lymphadenopathy:     She has no cervical adenopathy.   Neurological: She is alert and oriented to person, place, and time. No cranial nerve deficit.   Skin: Skin is warm and dry.   Psychiatric:  She has a normal mood and affect. Her behavior is normal. Thought content normal.   Vitals reviewed.      Assessment:       1. Malignant neoplasm of lower-inner quadrant of right breast of female, estrogen receptor positive        Plan:       Continue Tamoxifen and return to clinic in 3 months.  Annual mammogram due 12/2017.  All questions answered to satisfaction of patient.

## 2017-08-22 DIAGNOSIS — C50.919 MALIGNANT NEOPLASM OF BREAST IN FEMALE, ESTROGEN RECEPTOR POSITIVE, UNSPECIFIED LATERALITY, UNSPECIFIED SITE OF BREAST: Primary | ICD-10-CM

## 2017-08-22 DIAGNOSIS — Z17.0 MALIGNANT NEOPLASM OF BREAST IN FEMALE, ESTROGEN RECEPTOR POSITIVE, UNSPECIFIED LATERALITY, UNSPECIFIED SITE OF BREAST: Primary | ICD-10-CM

## 2017-08-22 RX ORDER — TAMOXIFEN CITRATE 20 MG/1
20 TABLET ORAL DAILY
Qty: 30 TABLET | Refills: 11 | Status: SHIPPED | OUTPATIENT
Start: 2017-08-22 | End: 2018-08-21 | Stop reason: SDUPTHER

## 2017-09-11 DIAGNOSIS — E11.59 HYPERTENSION ASSOCIATED WITH DIABETES: ICD-10-CM

## 2017-09-11 DIAGNOSIS — I15.2 HYPERTENSION ASSOCIATED WITH DIABETES: ICD-10-CM

## 2017-09-11 RX ORDER — LOSARTAN POTASSIUM 100 MG/1
TABLET ORAL
Qty: 90 TABLET | Refills: 1 | Status: SHIPPED | OUTPATIENT
Start: 2017-09-11 | End: 2018-03-21 | Stop reason: SDUPTHER

## 2017-09-25 ENCOUNTER — LAB VISIT (OUTPATIENT)
Dept: LAB | Facility: HOSPITAL | Age: 77
End: 2017-09-25
Attending: FAMILY MEDICINE
Payer: MEDICARE

## 2017-09-25 DIAGNOSIS — E78.5 DM TYPE 2 WITH DIABETIC DYSLIPIDEMIA: ICD-10-CM

## 2017-09-25 DIAGNOSIS — E11.69 DM TYPE 2 WITH DIABETIC DYSLIPIDEMIA: ICD-10-CM

## 2017-09-25 LAB
ALBUMIN SERPL BCP-MCNC: 3.1 G/DL
ALP SERPL-CCNC: 52 U/L
ALT SERPL W/O P-5'-P-CCNC: 10 U/L
ANION GAP SERPL CALC-SCNC: 6 MMOL/L
AST SERPL-CCNC: 21 U/L
BILIRUB SERPL-MCNC: 0.6 MG/DL
BUN SERPL-MCNC: 27 MG/DL
CALCIUM SERPL-MCNC: 9.6 MG/DL
CHLORIDE SERPL-SCNC: 110 MMOL/L
CHOLEST SERPL-MCNC: 145 MG/DL
CHOLEST/HDLC SERPL: 2.2 {RATIO}
CO2 SERPL-SCNC: 26 MMOL/L
CREAT SERPL-MCNC: 1.8 MG/DL
EST. GFR  (AFRICAN AMERICAN): 30.6 ML/MIN/1.73 M^2
EST. GFR  (NON AFRICAN AMERICAN): 26.6 ML/MIN/1.73 M^2
ESTIMATED AVG GLUCOSE: 160 MG/DL
GLUCOSE SERPL-MCNC: 78 MG/DL
HBA1C MFR BLD HPLC: 7.2 %
HDLC SERPL-MCNC: 67 MG/DL
HDLC SERPL: 46.2 %
LDLC SERPL CALC-MCNC: 66.2 MG/DL
NONHDLC SERPL-MCNC: 78 MG/DL
POTASSIUM SERPL-SCNC: 4.8 MMOL/L
PROT SERPL-MCNC: 7.1 G/DL
SODIUM SERPL-SCNC: 142 MMOL/L
TRIGL SERPL-MCNC: 59 MG/DL

## 2017-09-25 PROCEDURE — 83036 HEMOGLOBIN GLYCOSYLATED A1C: CPT

## 2017-09-25 PROCEDURE — 80053 COMPREHEN METABOLIC PANEL: CPT

## 2017-09-25 PROCEDURE — 36415 COLL VENOUS BLD VENIPUNCTURE: CPT | Mod: PO

## 2017-09-25 PROCEDURE — 80061 LIPID PANEL: CPT

## 2017-09-29 ENCOUNTER — OFFICE VISIT (OUTPATIENT)
Dept: FAMILY MEDICINE | Facility: CLINIC | Age: 77
End: 2017-09-29
Payer: MEDICARE

## 2017-09-29 VITALS
SYSTOLIC BLOOD PRESSURE: 120 MMHG | DIASTOLIC BLOOD PRESSURE: 62 MMHG | WEIGHT: 169.56 LBS | HEART RATE: 96 BPM | HEIGHT: 64 IN | OXYGEN SATURATION: 96 % | BODY MASS INDEX: 28.95 KG/M2

## 2017-09-29 DIAGNOSIS — E11.22 TYPE 2 DIABETES MELLITUS WITH STAGE 3 CHRONIC KIDNEY DISEASE, WITHOUT LONG-TERM CURRENT USE OF INSULIN: ICD-10-CM

## 2017-09-29 DIAGNOSIS — Z23 NEED FOR INFLUENZA VACCINATION: ICD-10-CM

## 2017-09-29 DIAGNOSIS — Z79.810 USE OF TAMOXIFEN (NOLVADEX): ICD-10-CM

## 2017-09-29 DIAGNOSIS — Z17.0 MALIGNANT NEOPLASM OF LOWER-INNER QUADRANT OF RIGHT BREAST OF FEMALE, ESTROGEN RECEPTOR POSITIVE: ICD-10-CM

## 2017-09-29 DIAGNOSIS — N18.30 HYPERTENSIVE KIDNEY DISEASE WITH CHRONIC KIDNEY DISEASE STAGE III: Primary | ICD-10-CM

## 2017-09-29 DIAGNOSIS — E78.5 DM TYPE 2 WITH DIABETIC DYSLIPIDEMIA: ICD-10-CM

## 2017-09-29 DIAGNOSIS — F32.0 MILD SINGLE CURRENT EPISODE OF MAJOR DEPRESSIVE DISORDER: ICD-10-CM

## 2017-09-29 DIAGNOSIS — C50.311 MALIGNANT NEOPLASM OF LOWER-INNER QUADRANT OF RIGHT BREAST OF FEMALE, ESTROGEN RECEPTOR POSITIVE: ICD-10-CM

## 2017-09-29 DIAGNOSIS — N18.30 TYPE 2 DIABETES MELLITUS WITH STAGE 3 CHRONIC KIDNEY DISEASE, WITHOUT LONG-TERM CURRENT USE OF INSULIN: ICD-10-CM

## 2017-09-29 DIAGNOSIS — I12.9 HYPERTENSIVE KIDNEY DISEASE WITH CHRONIC KIDNEY DISEASE STAGE III: Primary | ICD-10-CM

## 2017-09-29 DIAGNOSIS — E11.69 DM TYPE 2 WITH DIABETIC DYSLIPIDEMIA: ICD-10-CM

## 2017-09-29 DIAGNOSIS — M85.80 OSTEOPENIA, UNSPECIFIED LOCATION: ICD-10-CM

## 2017-09-29 PROCEDURE — 99999 PR PBB SHADOW E&M-EST. PATIENT-LVL III: CPT | Mod: PBBFAC,,, | Performed by: FAMILY MEDICINE

## 2017-09-29 PROCEDURE — 3074F SYST BP LT 130 MM HG: CPT | Mod: ,,, | Performed by: FAMILY MEDICINE

## 2017-09-29 PROCEDURE — 99213 OFFICE O/P EST LOW 20 MIN: CPT | Mod: PBBFAC,PO | Performed by: FAMILY MEDICINE

## 2017-09-29 PROCEDURE — G0008 ADMIN INFLUENZA VIRUS VAC: HCPCS | Mod: PBBFAC,PO

## 2017-09-29 PROCEDURE — 1159F MED LIST DOCD IN RCRD: CPT | Mod: ,,, | Performed by: FAMILY MEDICINE

## 2017-09-29 PROCEDURE — 99214 OFFICE O/P EST MOD 30 MIN: CPT | Mod: S$PBB,,, | Performed by: FAMILY MEDICINE

## 2017-09-29 PROCEDURE — 1126F AMNT PAIN NOTED NONE PRSNT: CPT | Mod: ,,, | Performed by: FAMILY MEDICINE

## 2017-09-29 PROCEDURE — 3078F DIAST BP <80 MM HG: CPT | Mod: ,,, | Performed by: FAMILY MEDICINE

## 2017-09-29 NOTE — PROGRESS NOTES
Subjective:       Patient ID: Whit Sloan is a 77 y.o. female.    Chief Complaint: Follow-up (3 mos); Hypertension; Hyperlipidemia; Diabetes; and Chronic Kidney Disease    Hypertension   This is a chronic problem. The current episode started more than 1 year ago. The problem is unchanged. The problem is controlled. Associated symptoms include peripheral edema. Pertinent negatives include no blurred vision, chest pain, headaches, orthopnea, palpitations, PND or shortness of breath. Past treatments include diuretics. The current treatment provides significant improvement. There are no compliance problems.  Hypertensive end-organ damage includes kidney disease. Identifiable causes of hypertension include chronic renal disease.   Hyperlipidemia   This is a chronic problem. The current episode started more than 1 year ago. The problem is controlled. Recent lipid tests were reviewed and are normal. Exacerbating diseases include chronic renal disease and diabetes. She has no history of hypothyroidism, liver disease, obesity or nephrotic syndrome. Pertinent negatives include no chest pain or shortness of breath.   Diabetes   She presents for her follow-up diabetic visit. She has type 2 diabetes mellitus. There are no hypoglycemic associated symptoms. Pertinent negatives for hypoglycemia include no headaches. Pertinent negatives for diabetes include no blurred vision, no chest pain, no fatigue, no foot paresthesias, no foot ulcerations, no polydipsia, no polyphagia, no polyuria, no visual change, no weakness and no weight loss. There are no hypoglycemic complications. She is compliant with treatment all of the time.   Pt notes intermittent left buttock pain for the past 3 months. No associated low back pain, numbness or paresthesias in legs. Denies bowel or bladder incontinence.  Had eye exam with Dr. Min on 9/27/17. Next appt scheduled for 1/18.  Pt is followed by Dr. Sprague for CKD, Stage 3. Last visit August 2017 and  will f/u in Feb 2018.  Pt has been unable to take Januvia daily due to cost.  Depression is controlled on Fluoxetine 10 mg daily.  Pt has right breast CA for which she is treated with Tamoxifen. Pt is followed by Oncology.  Results for orders placed or performed in visit on 09/25/17   Lipid panel   Result Value Ref Range    Cholesterol 145 120 - 199 mg/dL    Triglycerides 59 30 - 150 mg/dL    HDL 67 40 - 75 mg/dL    LDL Cholesterol 66.2 63.0 - 159.0 mg/dL    HDL/Chol Ratio 46.2 20.0 - 50.0 %    Total Cholesterol/HDL Ratio 2.2 2.0 - 5.0    Non-HDL Cholesterol 78 mg/dL   Comprehensive metabolic panel   Result Value Ref Range    Sodium 142 136 - 145 mmol/L    Potassium 4.8 3.5 - 5.1 mmol/L    Chloride 110 95 - 110 mmol/L    CO2 26 23 - 29 mmol/L    Glucose 78 70 - 110 mg/dL    BUN, Bld 27 (H) 8 - 23 mg/dL    Creatinine 1.8 (H) 0.5 - 1.4 mg/dL    Calcium 9.6 8.7 - 10.5 mg/dL    Total Protein 7.1 6.0 - 8.4 g/dL    Albumin 3.1 (L) 3.5 - 5.2 g/dL    Total Bilirubin 0.6 0.1 - 1.0 mg/dL    Alkaline Phosphatase 52 (L) 55 - 135 U/L    AST 21 10 - 40 U/L    ALT 10 10 - 44 U/L    Anion Gap 6 (L) 8 - 16 mmol/L    eGFR if African American 30.6 (A) >60 mL/min/1.73 m^2    eGFR if non  26.6 (A) >60 mL/min/1.73 m^2   Hemoglobin A1c   Result Value Ref Range    Hemoglobin A1C 7.2 (H) 4.0 - 5.6 %    Estimated Avg Glucose 160 (H) 68 - 131 mg/dL     Review of Systems   Constitutional: Negative for chills, fatigue, fever and weight loss.   Eyes: Negative for blurred vision.   Respiratory: Negative for shortness of breath.    Cardiovascular: Positive for leg swelling. Negative for chest pain, palpitations, orthopnea and PND.   Gastrointestinal: Negative for abdominal pain, anal bleeding, blood in stool, constipation, diarrhea, nausea and vomiting.   Endocrine: Negative for polydipsia, polyphagia and polyuria.   Genitourinary: Negative for dysuria and hematuria.   Neurological: Negative for weakness and headaches.        Objective:      Physical Exam   Constitutional: She is oriented to person, place, and time. She appears well-developed and well-nourished.   HENT:   Head: Normocephalic and atraumatic.   Neck: Normal range of motion. Neck supple. No JVD present. No thyromegaly present.   Cardiovascular: Normal rate, regular rhythm and normal heart sounds.    Pulmonary/Chest: Effort normal and breath sounds normal. She has no wheezes. She has no rales.   Abdominal: Soft. Bowel sounds are normal. She exhibits no mass. There is no tenderness.   Musculoskeletal:        Right hip: She exhibits normal range of motion, normal strength and no tenderness.        Left hip: She exhibits normal range of motion, normal strength and no tenderness.        Cervical back: She exhibits normal range of motion, no tenderness and no bony tenderness.        Thoracic back: She exhibits normal range of motion, no tenderness and no bony tenderness.        Lumbar back: She exhibits normal range of motion, no tenderness and no bony tenderness.   Lymphadenopathy:     She has no cervical adenopathy.   Neurological: She is alert and oriented to person, place, and time.   Skin: Skin is warm and dry.   Psychiatric: She has a normal mood and affect.   Vitals reviewed.      Assessment:         See plan  Plan:       Whit was seen today for follow-up, hypertension, hyperlipidemia, diabetes and chronic kidney disease.    Diagnoses and all orders for this visit:    Hypertensive kidney disease with chronic kidney disease stage III: Stable  - F/U with Dr. Sprague Feb 2018    DM type 2 with diabetic dyslipidemia: Stable    Type 2 diabetes mellitus with stage 3 chronic kidney disease, without long-term current use of insulin: Stable  -     Comprehensive metabolic panel; Future  -     Hemoglobin A1c; Future    Osteopenia, unspecified location: Stable  - Continue calcium and vitamin D supplements    Need for influenza vaccination  -     Influenza - High Dose (65+) (PF)  (IM)    Malignant neoplasm of lower-inner quadrant of right breast of female, estrogen receptor positive  - Continue f/u with Oncology    Use of tamoxifen (Nolvadex)  - Continue f/u with Oncology    BMI 29.0-29.9,adult  Weight loss advised. Dietary and exercise counseling done.    Mild single current episode of major depressive disorder: Stable    Other orders  -     SITagliptin (JANUVIA) 50 MG Tab; Take 1 tablet (50 mg total) by mouth once daily.    F/U in 3 months.

## 2017-10-06 RX ORDER — SIMVASTATIN 40 MG/1
TABLET, FILM COATED ORAL
Qty: 90 TABLET | Refills: 0 | Status: SHIPPED | OUTPATIENT
Start: 2017-10-06 | End: 2018-01-08 | Stop reason: SDUPTHER

## 2017-10-12 DIAGNOSIS — N18.30 DIABETES MELLITUS WITH STAGE 3 CHRONIC KIDNEY DISEASE: ICD-10-CM

## 2017-10-12 DIAGNOSIS — E11.69 DM TYPE 2 WITH DIABETIC DYSLIPIDEMIA: ICD-10-CM

## 2017-10-12 DIAGNOSIS — E11.22 DIABETES MELLITUS WITH STAGE 3 CHRONIC KIDNEY DISEASE: ICD-10-CM

## 2017-10-12 DIAGNOSIS — E78.5 DM TYPE 2 WITH DIABETIC DYSLIPIDEMIA: ICD-10-CM

## 2017-10-12 RX ORDER — PIOGLITAZONEHYDROCHLORIDE 45 MG/1
TABLET ORAL
Qty: 90 TABLET | Refills: 0 | Status: SHIPPED | OUTPATIENT
Start: 2017-10-12 | End: 2020-02-06

## 2017-10-23 ENCOUNTER — TELEPHONE (OUTPATIENT)
Dept: HEMATOLOGY/ONCOLOGY | Facility: CLINIC | Age: 77
End: 2017-10-23

## 2017-10-23 NOTE — TELEPHONE ENCOUNTER
Called pt regarding appt change on 10/30.   Rescheduled per pt request.   New reminder mailed.   Pt verbalized understanding.

## 2017-10-31 ENCOUNTER — LAB VISIT (OUTPATIENT)
Dept: LAB | Facility: HOSPITAL | Age: 77
End: 2017-10-31
Attending: INTERNAL MEDICINE
Payer: MEDICARE

## 2017-10-31 ENCOUNTER — OFFICE VISIT (OUTPATIENT)
Dept: HEMATOLOGY/ONCOLOGY | Facility: CLINIC | Age: 77
End: 2017-10-31
Payer: MEDICARE

## 2017-10-31 VITALS
DIASTOLIC BLOOD PRESSURE: 56 MMHG | HEART RATE: 65 BPM | HEIGHT: 64 IN | TEMPERATURE: 98 F | SYSTOLIC BLOOD PRESSURE: 118 MMHG | BODY MASS INDEX: 28.46 KG/M2 | WEIGHT: 166.69 LBS | OXYGEN SATURATION: 99 % | RESPIRATION RATE: 17 BRPM

## 2017-10-31 DIAGNOSIS — Z17.0 MALIGNANT NEOPLASM OF LOWER-INNER QUADRANT OF RIGHT BREAST OF FEMALE, ESTROGEN RECEPTOR POSITIVE: ICD-10-CM

## 2017-10-31 DIAGNOSIS — N18.30 HYPERTENSIVE KIDNEY DISEASE WITH CHRONIC KIDNEY DISEASE STAGE III: ICD-10-CM

## 2017-10-31 DIAGNOSIS — I12.9 HYPERTENSIVE KIDNEY DISEASE WITH CHRONIC KIDNEY DISEASE STAGE III: ICD-10-CM

## 2017-10-31 DIAGNOSIS — C50.311 MALIGNANT NEOPLASM OF LOWER-INNER QUADRANT OF RIGHT BREAST OF FEMALE, ESTROGEN RECEPTOR POSITIVE: ICD-10-CM

## 2017-10-31 LAB
ALBUMIN SERPL BCP-MCNC: 3 G/DL
ALP SERPL-CCNC: 56 U/L
ALT SERPL W/O P-5'-P-CCNC: 7 U/L
ANION GAP SERPL CALC-SCNC: 6 MMOL/L
AST SERPL-CCNC: 17 U/L
BILIRUB SERPL-MCNC: 0.4 MG/DL
BUN SERPL-MCNC: 34 MG/DL
CALCIUM SERPL-MCNC: 8.9 MG/DL
CHLORIDE SERPL-SCNC: 107 MMOL/L
CO2 SERPL-SCNC: 24 MMOL/L
CREAT SERPL-MCNC: 2.5 MG/DL
ERYTHROCYTE [DISTWIDTH] IN BLOOD BY AUTOMATED COUNT: 13.4 %
EST. GFR  (AFRICAN AMERICAN): 20.6 ML/MIN/1.73 M^2
EST. GFR  (NON AFRICAN AMERICAN): 17.9 ML/MIN/1.73 M^2
GLUCOSE SERPL-MCNC: 162 MG/DL
HCT VFR BLD AUTO: 33 %
HGB BLD-MCNC: 10.3 G/DL
IMM GRANULOCYTES # BLD AUTO: 0.01 K/UL
MCH RBC QN AUTO: 27.9 PG
MCHC RBC AUTO-ENTMCNC: 31.2 G/DL
MCV RBC AUTO: 89 FL
NEUTROPHILS # BLD AUTO: 3 K/UL
PLATELET # BLD AUTO: 135 K/UL
PMV BLD AUTO: 11 FL
POTASSIUM SERPL-SCNC: 4.6 MMOL/L
PROT SERPL-MCNC: 6.9 G/DL
RBC # BLD AUTO: 3.69 M/UL
SODIUM SERPL-SCNC: 137 MMOL/L
WBC # BLD AUTO: 4.88 K/UL

## 2017-10-31 PROCEDURE — 99213 OFFICE O/P EST LOW 20 MIN: CPT | Mod: S$PBB,,, | Performed by: PHYSICIAN ASSISTANT

## 2017-10-31 PROCEDURE — 85027 COMPLETE CBC AUTOMATED: CPT

## 2017-10-31 PROCEDURE — 80053 COMPREHEN METABOLIC PANEL: CPT

## 2017-10-31 PROCEDURE — 99999 PR PBB SHADOW E&M-EST. PATIENT-LVL IV: CPT | Mod: PBBFAC,,, | Performed by: PHYSICIAN ASSISTANT

## 2017-10-31 PROCEDURE — 36415 COLL VENOUS BLD VENIPUNCTURE: CPT

## 2017-10-31 PROCEDURE — 99214 OFFICE O/P EST MOD 30 MIN: CPT | Mod: PBBFAC | Performed by: PHYSICIAN ASSISTANT

## 2017-10-31 RX ORDER — POTASSIUM CITRATE 10 MEQ/1
TABLET, EXTENDED RELEASE ORAL
Refills: 3 | COMMUNITY
Start: 2017-10-23 | End: 2020-02-06

## 2017-10-31 NOTE — PROGRESS NOTES
Subjective:       Patient ID: Whit Sloan is a 77 y.o. female.    Chief Complaint: Malignant neoplasm of lower-inner quadrant of right female b    HPI      This is a 76 yo female who presents for follow-up on Tamoxifen.   No breast pain or discomfort  Weight stable.  No shortness of breath. No headaches. No fever or chills.   Followed by Angie Sprague (Nephrologist)  for CKD          Oncology History:  - 4/6/15 underwent routine mammogram and a focal asymmetric density in the right breast requiring additional evaluation seen.  Breast MRI was initially recommended and read as BI-RADS Category 0: Incomplete needing additional imaging   This was addended on 04/09/2015 and upon further review, focal asymmetric density in the right breast was read ads probably  benign. Follow-up in 6 months is recommended.  - 10/22/15 she underwent follow-up diagnostic mammogram which revealed stable focal asymmetry in the right breast is benign-negative.  Follow-up in 6  months is recommended.    ACR BI-RADS Category 2: Benign  - On 5/9/16 she underwent breast ultrasound  Stable focal asymmetry in the right breast is suspicious.  Histology using core biopsy was recommended.  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    HENT: Negative for congestion, rhinorrhea, sore throat and trouble swallowing.    Eyes: Negative for visual disturbance.   Respiratory: Negative for cough, chest tightness and shortness of breath.    Cardiovascular: Negative for chest pain, palpitations and leg swelling.   Gastrointestinal: Negative for abdominal pain, blood  in stool, constipation, diarrhea, nausea and vomiting.   Genitourinary: Negative for dysuria, hematuria and vaginal bleeding.   Musculoskeletal: Negative for arthralgias, back pain and myalgias.   Skin: Negative for pallor and rash.   Neurological: Negative for dizziness, weakness and headaches.   Hematological: Negative for adenopathy. Does not bruise/bleed easily.   Psychiatric/Behavioral: Negative for dysphoric mood and suicidal ideas. The patient is not nervous/anxious.        Objective:      Physical Exam   Constitutional: She is oriented to person, place, and time. She appears well-developed and well-nourished. No distress.   Presents alone   HENT:   Head: Normocephalic.   Mouth/Throat: Oropharynx is clear and moist. No oropharyngeal exudate.   Eyes: Conjunctivae are normal. Pupils are equal, round, and reactive to light. No scleral icterus.   Neck: Normal range of motion. Neck supple. No thyromegaly present.   Cardiovascular: Normal rate and regular rhythm.    Pulmonary/Chest: Effort normal and breath sounds normal. No respiratory distress.   Right left with well healed lumpectomy incision and some generalized fibrosis at lateral aspect of breast secondary to radiation.No mass, nodule or skin changes. Left breast without mass, nodule or skin changes. No axillary or supraclavicular adenopathy.    Abdominal: Soft. Bowel sounds are normal. She exhibits no distension and no mass. There is no tenderness.   Musculoskeletal: Normal range of motion. She exhibits no edema or tenderness.   No spinal or paraspinal tenderness to palpation     Lymphadenopathy:     She has no cervical adenopathy.   Neurological: She is alert and oriented to person, place, and time. No cranial nerve deficit.   Skin: Skin is warm and dry.   Psychiatric: She has a normal mood and affect. Her behavior is normal. Thought content normal.   Vitals reviewed.      Assessment:       1. Malignant neoplasm of lower-inner quadrant of right breast of  female, estrogen receptor positive    2. Hypertensive kidney disease with chronic kidney disease stage III        Plan:       Continue Tamoxifen and return to clinic in 3 months.  Annual mammogram due 12/2017- order submitted.  Patient's labs show decline in known renal insuffieciency. I printed a copy of the labs and asked the patient to please make an appointment with her nephrologist (Dr. Sprague)- patient amenable to plan.   All questions answered to satisfaction of patient.

## 2017-10-31 NOTE — PROGRESS NOTES
Subjective:       Patient ID: Whit Sloan is a 77 y.o. female.    Chief Complaint: Malignant neoplasm of lower-inner quadrant of right female b    HPI  Review of Systems    Objective:      Physical Exam    Assessment:       No diagnosis found.    Plan:       ***

## 2017-12-13 ENCOUNTER — HOSPITAL ENCOUNTER (OUTPATIENT)
Dept: RADIOLOGY | Facility: HOSPITAL | Age: 77
Discharge: HOME | End: 2017-12-13
Attending: PHYSICIAN ASSISTANT
Payer: MEDICARE

## 2017-12-13 VITALS — WEIGHT: 166 LBS | HEIGHT: 64 IN | BODY MASS INDEX: 28.34 KG/M2

## 2017-12-13 DIAGNOSIS — C50.311 MALIGNANT NEOPLASM OF LOWER-INNER QUADRANT OF RIGHT BREAST OF FEMALE, ESTROGEN RECEPTOR POSITIVE: ICD-10-CM

## 2017-12-13 DIAGNOSIS — Z17.0 MALIGNANT NEOPLASM OF LOWER-INNER QUADRANT OF RIGHT BREAST OF FEMALE, ESTROGEN RECEPTOR POSITIVE: ICD-10-CM

## 2017-12-13 PROCEDURE — 77066 DX MAMMO INCL CAD BI: CPT | Mod: TC,PO

## 2017-12-13 PROCEDURE — 77062 BREAST TOMOSYNTHESIS BI: CPT | Mod: 26,,, | Performed by: RADIOLOGY

## 2017-12-13 PROCEDURE — 77066 DX MAMMO INCL CAD BI: CPT | Mod: 26,,, | Performed by: RADIOLOGY

## 2017-12-21 ENCOUNTER — LAB VISIT (OUTPATIENT)
Dept: LAB | Facility: HOSPITAL | Age: 77
End: 2017-12-21
Attending: FAMILY MEDICINE
Payer: MEDICARE

## 2017-12-21 DIAGNOSIS — E11.22 TYPE 2 DIABETES MELLITUS WITH STAGE 3 CHRONIC KIDNEY DISEASE, WITHOUT LONG-TERM CURRENT USE OF INSULIN: ICD-10-CM

## 2017-12-21 DIAGNOSIS — N18.30 TYPE 2 DIABETES MELLITUS WITH STAGE 3 CHRONIC KIDNEY DISEASE, WITHOUT LONG-TERM CURRENT USE OF INSULIN: ICD-10-CM

## 2017-12-21 LAB
ALBUMIN SERPL BCP-MCNC: 3.2 G/DL
ALP SERPL-CCNC: 51 U/L
ALT SERPL W/O P-5'-P-CCNC: 9 U/L
ANION GAP SERPL CALC-SCNC: 7 MMOL/L
AST SERPL-CCNC: 21 U/L
BILIRUB SERPL-MCNC: 0.6 MG/DL
BUN SERPL-MCNC: 33 MG/DL
CALCIUM SERPL-MCNC: 9.3 MG/DL
CHLORIDE SERPL-SCNC: 109 MMOL/L
CO2 SERPL-SCNC: 25 MMOL/L
CREAT SERPL-MCNC: 2.2 MG/DL
EST. GFR  (AFRICAN AMERICAN): 24 ML/MIN/1.73 M^2
EST. GFR  (NON AFRICAN AMERICAN): 20.9 ML/MIN/1.73 M^2
ESTIMATED AVG GLUCOSE: 134 MG/DL
GLUCOSE SERPL-MCNC: 72 MG/DL
HBA1C MFR BLD HPLC: 6.3 %
POTASSIUM SERPL-SCNC: 5.2 MMOL/L
PROT SERPL-MCNC: 7.2 G/DL
SODIUM SERPL-SCNC: 141 MMOL/L

## 2017-12-21 PROCEDURE — 36415 COLL VENOUS BLD VENIPUNCTURE: CPT | Mod: PO

## 2017-12-21 PROCEDURE — 80053 COMPREHEN METABOLIC PANEL: CPT

## 2017-12-21 PROCEDURE — 83036 HEMOGLOBIN GLYCOSYLATED A1C: CPT

## 2017-12-29 ENCOUNTER — OFFICE VISIT (OUTPATIENT)
Dept: FAMILY MEDICINE | Facility: CLINIC | Age: 77
End: 2017-12-29
Payer: MEDICARE

## 2017-12-29 VITALS
DIASTOLIC BLOOD PRESSURE: 62 MMHG | WEIGHT: 167.31 LBS | OXYGEN SATURATION: 97 % | HEIGHT: 64 IN | BODY MASS INDEX: 28.56 KG/M2 | HEART RATE: 75 BPM | SYSTOLIC BLOOD PRESSURE: 114 MMHG

## 2017-12-29 DIAGNOSIS — Z79.810 USE OF TAMOXIFEN (NOLVADEX): ICD-10-CM

## 2017-12-29 DIAGNOSIS — E11.22 TYPE 2 DIABETES MELLITUS WITH STAGE 4 CHRONIC KIDNEY DISEASE, WITHOUT LONG-TERM CURRENT USE OF INSULIN: ICD-10-CM

## 2017-12-29 DIAGNOSIS — E11.69 DM TYPE 2 WITH DIABETIC DYSLIPIDEMIA: ICD-10-CM

## 2017-12-29 DIAGNOSIS — F32.0 MILD SINGLE CURRENT EPISODE OF MAJOR DEPRESSIVE DISORDER: ICD-10-CM

## 2017-12-29 DIAGNOSIS — I12.9 HYPERTENSIVE KIDNEY DISEASE WITH CKD STAGE IV: Primary | ICD-10-CM

## 2017-12-29 DIAGNOSIS — E78.5 DM TYPE 2 WITH DIABETIC DYSLIPIDEMIA: ICD-10-CM

## 2017-12-29 DIAGNOSIS — N18.4 TYPE 2 DIABETES MELLITUS WITH STAGE 4 CHRONIC KIDNEY DISEASE, WITHOUT LONG-TERM CURRENT USE OF INSULIN: ICD-10-CM

## 2017-12-29 DIAGNOSIS — N18.4 HYPERTENSIVE KIDNEY DISEASE WITH CKD STAGE IV: Primary | ICD-10-CM

## 2017-12-29 PROCEDURE — 99999 PR PBB SHADOW E&M-EST. PATIENT-LVL IV: CPT | Mod: PBBFAC,,, | Performed by: FAMILY MEDICINE

## 2017-12-29 PROCEDURE — 99214 OFFICE O/P EST MOD 30 MIN: CPT | Mod: PBBFAC,PO | Performed by: FAMILY MEDICINE

## 2017-12-29 PROCEDURE — 99214 OFFICE O/P EST MOD 30 MIN: CPT | Mod: S$PBB,,, | Performed by: FAMILY MEDICINE

## 2017-12-29 NOTE — PROGRESS NOTES
Subjective:       Patient ID: Whit Sloan is a 77 y.o. female.    Chief Complaint: hypertensive kidney disease (3 mos follow up); Diabetes; Hyperlipidemia; and Hypertension    Diabetes   She presents for her follow-up diabetic visit. She has type 2 diabetes mellitus. There are no hypoglycemic associated symptoms. Pertinent negatives for hypoglycemia include no headaches or nervousness/anxiousness. Pertinent negatives for diabetes include no blurred vision, no chest pain, no fatigue, no foot paresthesias, no foot ulcerations, no polydipsia, no polyphagia, no polyuria, no visual change, no weakness and no weight loss. There are no hypoglycemic complications. Diabetic complications include nephropathy. Current diabetic treatment includes oral agent (triple therapy). She is compliant with treatment all of the time. Her weight is stable. She is following a diabetic diet. She participates in exercise three times a week. An ACE inhibitor/angiotensin II receptor blocker is being taken. Eye exam is current.   Hyperlipidemia   This is a chronic problem. The current episode started more than 1 year ago. Pertinent negatives include no chest pain or shortness of breath.   Hypertension   This is a chronic problem. The current episode started more than 1 year ago. The problem is unchanged. The problem is controlled. Associated symptoms include peripheral edema. Pertinent negatives include no blurred vision, chest pain, headaches, orthopnea, palpitations, PND or shortness of breath. Past treatments include diuretics and angiotensin blockers. The current treatment provides significant improvement. There are no compliance problems.  Hypertensive end-organ damage includes kidney disease.   Pt is followed by Dr. Sprague for CKD. Next visit Feb 2018.  Continues on Tamoxifen for treatment of breast CA. Is followed by Dr. Beasley  Results for orders placed or performed in visit on 12/21/17   Comprehensive metabolic panel   Result  Value Ref Range    Sodium 141 136 - 145 mmol/L    Potassium 5.2 (H) 3.5 - 5.1 mmol/L    Chloride 109 95 - 110 mmol/L    CO2 25 23 - 29 mmol/L    Glucose 72 70 - 110 mg/dL    BUN, Bld 33 (H) 8 - 23 mg/dL    Creatinine 2.2 (H) 0.5 - 1.4 mg/dL    Calcium 9.3 8.7 - 10.5 mg/dL    Total Protein 7.2 6.0 - 8.4 g/dL    Albumin 3.2 (L) 3.5 - 5.2 g/dL    Total Bilirubin 0.6 0.1 - 1.0 mg/dL    Alkaline Phosphatase 51 (L) 55 - 135 U/L    AST 21 10 - 40 U/L    ALT 9 (L) 10 - 44 U/L    Anion Gap 7 (L) 8 - 16 mmol/L    eGFR if African American 24.0 (A) >60 mL/min/1.73 m^2    eGFR if non African American 20.9 (A) >60 mL/min/1.73 m^2   Hemoglobin A1c   Result Value Ref Range    Hemoglobin A1C 6.3 (H) 4.0 - 5.6 %    Estimated Avg Glucose 134 (H) 68 - 131 mg/dL     Review of Systems   Constitutional: Negative for chills, fatigue, fever and weight loss.   Eyes: Negative for blurred vision.   Respiratory: Negative for shortness of breath.    Cardiovascular: Negative for chest pain, palpitations, orthopnea and PND.   Gastrointestinal: Negative for abdominal pain, anal bleeding, blood in stool, constipation, diarrhea, nausea and vomiting.   Endocrine: Negative for polydipsia, polyphagia and polyuria.   Genitourinary: Negative for dysuria and hematuria.   Neurological: Negative for weakness and headaches.   Psychiatric/Behavioral: Negative for dysphoric mood and sleep disturbance. The patient is not nervous/anxious.         Depression is controlled with Fluoxetine.       Objective:      Physical Exam   Constitutional: She is oriented to person, place, and time. She appears well-developed and well-nourished.   HENT:   Head: Normocephalic and atraumatic.   Eyes: Conjunctivae and EOM are normal.   Neck: Normal range of motion. Neck supple. No JVD present. No thyromegaly present.   Cardiovascular: Normal rate, regular rhythm, normal heart sounds and intact distal pulses.    No murmur heard.  Pulses:       Dorsalis pedis pulses are 1+ on the  right side, and 1+ on the left side.        Posterior tibial pulses are 1+ on the right side, and 1+ on the left side.   Pulmonary/Chest: Effort normal and breath sounds normal. She has no wheezes. She has no rales.   Abdominal: Soft. Bowel sounds are normal. She exhibits no mass. There is no tenderness.   Musculoskeletal:        Right foot: There is normal range of motion and no deformity.        Left foot: There is normal range of motion and no deformity.   Feet:   Right Foot:   Protective Sensation: 10 sites tested. 10 sites sensed.   Skin Integrity: Negative for ulcer, blister, skin breakdown, erythema, warmth, callus or dry skin.   Left Foot:   Protective Sensation: 10 sites tested. 10 sites sensed.   Skin Integrity: Negative for ulcer, blister, skin breakdown, erythema, warmth, callus or dry skin.   Lymphadenopathy:     She has no cervical adenopathy.   Neurological: She is alert and oriented to person, place, and time.   Skin: Skin is warm and dry.   Psychiatric: She has a normal mood and affect.   Vitals reviewed.      Assessment:         See plan  Plan:       Whit was seen today for hypertensive kidney disease, diabetes, hyperlipidemia and hypertension.    Diagnoses and all orders for this visit:    Hypertensive kidney disease with CKD stage IV: Stable  - Continue f/u with Dr. Sprague. Will fax most recent labs to Dr. Sprague's office.    Type 2 diabetes mellitus with stage 4 chronic kidney disease, without long-term current use of insulin: Controlled    DM type 2 with diabetic dyslipidemia: Stable  -     Lipid panel; Future  -     Hemoglobin A1c; Future  -     Comprehensive metabolic panel; Future    Mild single current episode of major depressive disorder: Stable  - Continue Fluoxetine    Use of tamoxifen (Nolvadex)  - Continue f/u with Dr. Beasley    BMI 28.0-28.9,adult  Weight loss advised. Dietary and exercise counseling done.    F/U in 4 months. Pt to have Tdap and Zostavax at [facility]  today.

## 2018-01-08 RX ORDER — SIMVASTATIN 40 MG/1
TABLET, FILM COATED ORAL
Qty: 90 TABLET | Refills: 0 | Status: SHIPPED | OUTPATIENT
Start: 2018-01-08 | End: 2020-02-06 | Stop reason: SDUPTHER

## 2018-01-24 DIAGNOSIS — E78.5 DM TYPE 2 WITH DIABETIC DYSLIPIDEMIA: ICD-10-CM

## 2018-01-24 DIAGNOSIS — E11.69 DM TYPE 2 WITH DIABETIC DYSLIPIDEMIA: ICD-10-CM

## 2018-01-24 RX ORDER — GLIPIZIDE 10 MG/1
TABLET ORAL
Qty: 180 TABLET | Refills: 0 | Status: SHIPPED | OUTPATIENT
Start: 2018-01-24 | End: 2018-05-03 | Stop reason: SDUPTHER

## 2018-02-26 ENCOUNTER — DOCUMENTATION ONLY (OUTPATIENT)
Dept: FAMILY MEDICINE | Facility: CLINIC | Age: 78
End: 2018-02-26

## 2018-02-26 NOTE — PROGRESS NOTES
Pt seen by Dr. Angie Sprague on 2/8/18. Calcitriol increased to 0.5 mcg daily due to hyperparathyroidism. Pt has stable hypertensive nephrosclerosis. No proteinuria. Pt with Dexa BMD on 2/14/18 which showed osteopenia of the femoral neck.

## 2018-03-21 DIAGNOSIS — I15.2 HYPERTENSION ASSOCIATED WITH DIABETES: ICD-10-CM

## 2018-03-21 DIAGNOSIS — E11.59 HYPERTENSION ASSOCIATED WITH DIABETES: ICD-10-CM

## 2018-03-21 RX ORDER — LOSARTAN POTASSIUM 100 MG/1
TABLET ORAL
Qty: 90 TABLET | Refills: 0 | Status: SHIPPED | OUTPATIENT
Start: 2018-03-21 | End: 2018-06-25 | Stop reason: SDUPTHER

## 2018-04-02 RX ORDER — SITAGLIPTIN 50 MG/1
TABLET, FILM COATED ORAL
Qty: 90 TABLET | Refills: 1 | Status: SHIPPED | OUTPATIENT
Start: 2018-04-02 | End: 2018-04-30 | Stop reason: SDUPTHER

## 2018-04-30 ENCOUNTER — OFFICE VISIT (OUTPATIENT)
Dept: FAMILY MEDICINE | Facility: CLINIC | Age: 78
End: 2018-04-30
Payer: MEDICARE

## 2018-04-30 ENCOUNTER — LAB VISIT (OUTPATIENT)
Dept: LAB | Facility: HOSPITAL | Age: 78
End: 2018-04-30
Attending: FAMILY MEDICINE
Payer: MEDICARE

## 2018-04-30 VITALS
SYSTOLIC BLOOD PRESSURE: 112 MMHG | OXYGEN SATURATION: 96 % | BODY MASS INDEX: 26.98 KG/M2 | HEART RATE: 72 BPM | HEIGHT: 64 IN | WEIGHT: 158.06 LBS | DIASTOLIC BLOOD PRESSURE: 60 MMHG

## 2018-04-30 DIAGNOSIS — G44.229 CHRONIC TENSION-TYPE HEADACHE, NOT INTRACTABLE: ICD-10-CM

## 2018-04-30 DIAGNOSIS — R15.9 INCONTINENCE OF FECES WITH FECAL URGENCY: ICD-10-CM

## 2018-04-30 DIAGNOSIS — R19.7 DIARRHEA, UNSPECIFIED TYPE: ICD-10-CM

## 2018-04-30 DIAGNOSIS — M85.80 OSTEOPENIA, UNSPECIFIED LOCATION: ICD-10-CM

## 2018-04-30 DIAGNOSIS — E11.22 TYPE 2 DIABETES MELLITUS WITH STAGE 3 CHRONIC KIDNEY DISEASE, WITHOUT LONG-TERM CURRENT USE OF INSULIN: ICD-10-CM

## 2018-04-30 DIAGNOSIS — R42 DIZZINESS: ICD-10-CM

## 2018-04-30 DIAGNOSIS — I12.9 HYPERTENSIVE KIDNEY DISEASE WITH CHRONIC KIDNEY DISEASE STAGE III: Primary | ICD-10-CM

## 2018-04-30 DIAGNOSIS — E78.5 DM TYPE 2 WITH DIABETIC DYSLIPIDEMIA: ICD-10-CM

## 2018-04-30 DIAGNOSIS — E11.69 DM TYPE 2 WITH DIABETIC DYSLIPIDEMIA: ICD-10-CM

## 2018-04-30 DIAGNOSIS — F32.0 CURRENT MILD EPISODE OF MAJOR DEPRESSIVE DISORDER WITHOUT PRIOR EPISODE: ICD-10-CM

## 2018-04-30 DIAGNOSIS — N18.30 TYPE 2 DIABETES MELLITUS WITH STAGE 3 CHRONIC KIDNEY DISEASE, WITHOUT LONG-TERM CURRENT USE OF INSULIN: ICD-10-CM

## 2018-04-30 DIAGNOSIS — R15.2 INCONTINENCE OF FECES WITH FECAL URGENCY: ICD-10-CM

## 2018-04-30 DIAGNOSIS — N18.30 HYPERTENSIVE KIDNEY DISEASE WITH CHRONIC KIDNEY DISEASE STAGE III: Primary | ICD-10-CM

## 2018-04-30 LAB
ALBUMIN SERPL BCP-MCNC: 3.3 G/DL
ALP SERPL-CCNC: 53 U/L
ALT SERPL W/O P-5'-P-CCNC: 9 U/L
ANION GAP SERPL CALC-SCNC: 9 MMOL/L
AST SERPL-CCNC: 18 U/L
BILIRUB SERPL-MCNC: 0.5 MG/DL
BUN SERPL-MCNC: 39 MG/DL
CALCIUM SERPL-MCNC: 9.7 MG/DL
CHLORIDE SERPL-SCNC: 108 MMOL/L
CHOLEST SERPL-MCNC: 153 MG/DL
CHOLEST/HDLC SERPL: 2.6 {RATIO}
CO2 SERPL-SCNC: 24 MMOL/L
CREAT SERPL-MCNC: 2.4 MG/DL
EST. GFR  (AFRICAN AMERICAN): 21.6 ML/MIN/1.73 M^2
EST. GFR  (NON AFRICAN AMERICAN): 18.8 ML/MIN/1.73 M^2
ESTIMATED AVG GLUCOSE: 151 MG/DL
GLUCOSE SERPL-MCNC: 116 MG/DL
HBA1C MFR BLD HPLC: 6.9 %
HDLC SERPL-MCNC: 59 MG/DL
HDLC SERPL: 38.6 %
LDLC SERPL CALC-MCNC: 81 MG/DL
NONHDLC SERPL-MCNC: 94 MG/DL
POTASSIUM SERPL-SCNC: 4.6 MMOL/L
PROT SERPL-MCNC: 7.2 G/DL
SODIUM SERPL-SCNC: 141 MMOL/L
TRIGL SERPL-MCNC: 65 MG/DL

## 2018-04-30 PROCEDURE — 83036 HEMOGLOBIN GLYCOSYLATED A1C: CPT

## 2018-04-30 PROCEDURE — 36415 COLL VENOUS BLD VENIPUNCTURE: CPT | Mod: PO

## 2018-04-30 PROCEDURE — 80061 LIPID PANEL: CPT

## 2018-04-30 PROCEDURE — 99213 OFFICE O/P EST LOW 20 MIN: CPT | Mod: PBBFAC,PO | Performed by: FAMILY MEDICINE

## 2018-04-30 PROCEDURE — 99214 OFFICE O/P EST MOD 30 MIN: CPT | Mod: S$PBB,,, | Performed by: FAMILY MEDICINE

## 2018-04-30 PROCEDURE — 80053 COMPREHEN METABOLIC PANEL: CPT

## 2018-04-30 PROCEDURE — 99999 PR PBB SHADOW E&M-EST. PATIENT-LVL III: CPT | Mod: PBBFAC,,, | Performed by: FAMILY MEDICINE

## 2018-04-30 RX ORDER — LATANOPROST 50 UG/ML
SOLUTION/ DROPS OPHTHALMIC
Refills: 0 | COMMUNITY
Start: 2018-02-01 | End: 2020-02-06

## 2018-04-30 NOTE — MEDICAL/APP STUDENT
Subjective:       Patient ID: Whit Sloan is a 77 y.o. female.    Chief Complaint: Follow-up (4 mos); Hypertension; Diabetes; and Dizziness    HPI     Ms. Sloan is a 76 yo female with history of breast cancer, CKD stage IV, HTN and T2DM presenting today for follow-up. She reports experiencing new-onset headaches for the past 3 weeks. The headaches are localized in the frontal region and last for about 1 hour at a time. She has been having these headaches everyday which subside on their own without any medication. She denies any n/v, vision changes and falls. She does report feelings of dizziness on standing from sitting position.     Hypertension has been stable at home. Compliant with medications. Fasting blood sugars average 125. Denies polyuria, polydipsia, and polyphagia. Diet consists of oatmeal for breakfast and chicken, pasta or rice for dinner. Skips lunch. Will follow-up with optometry next month. Follows Dr. Sprague for CKD. Denies any numbness and tingling.    Patient also reports 1 month history of diarrhea and occasional fecal incontinence. She describes increased gas and urgency. Denies fever and chills. Denies n/v, abdominal pain and blood in stool. Denies recent diet changes. Reports 9 lb weight loss over last 4 months.   Mood stable.     Review of Systems   Constitutional: Negative for chills and fever.   HENT: Positive for postnasal drip. Negative for congestion, rhinorrhea and sore throat.    Eyes: Negative for pain, redness and itching.   Respiratory: Positive for cough. Negative for chest tightness and shortness of breath.    Cardiovascular: Negative for chest pain and leg swelling.   Gastrointestinal: Positive for diarrhea. Negative for abdominal pain, blood in stool, constipation, nausea and vomiting.   Endocrine: Positive for cold intolerance. Negative for polydipsia, polyphagia and polyuria.   Genitourinary: Negative for difficulty urinating, dysuria and hematuria.   Musculoskeletal:  Negative for arthralgias and myalgias.   Skin: Negative for color change, pallor and rash.   Neurological: Positive for dizziness and headaches. Negative for weakness and numbness.   Psychiatric/Behavioral: Negative for agitation and behavioral problems.       Objective:      Physical Exam   Constitutional: She is oriented to person, place, and time. She appears well-developed and well-nourished. No distress.   HENT:   Head: Normocephalic and atraumatic.   Right Ear: External ear normal.   Left Ear: External ear normal.   Nose: Nose normal.   Mouth/Throat: Oropharynx is clear and moist.   Eyes: Conjunctivae and EOM are normal. Pupils are equal, round, and reactive to light.   Bilateral arcus senilis   Neck: Normal range of motion. Neck supple.   Cardiovascular: Normal rate, regular rhythm, normal heart sounds and intact distal pulses.    Pulses:       Dorsalis pedis pulses are 2+ on the right side, and 2+ on the left side.        Posterior tibial pulses are 2+ on the right side, and 2+ on the left side.   Pulmonary/Chest: Effort normal and breath sounds normal.   Abdominal: Soft. Bowel sounds are normal. There is no tenderness.   Musculoskeletal: Normal range of motion.   Feet:   Right Foot:   Protective Sensation: 10 sites tested. 10 sites sensed.   Skin Integrity: Positive for dry skin.   Left Foot:   Protective Sensation: 10 sites tested. 10 sites sensed.   Skin Integrity: Positive for dry skin.   Neurological: She is alert and oriented to person, place, and time. No cranial nerve deficit or sensory deficit.   Skin: Skin is warm and dry. Capillary refill takes less than 2 seconds.   Psychiatric: She has a normal mood and affect. Her behavior is normal.   Vitals reviewed.      Assessment:       Please see plan below.  Plan:       Whit was seen today for follow-up, hypertension, diabetes, dizziness and hyperlipidemia.    Diagnoses and all orders for this visit:    Hypertensive kidney disease with chronic kidney  disease stage III  - follows up with Dr. Sprague for CKD  - continue with current HTN medications as prescribed    DM type 2 with diabetic dyslipidemia    Type 2 diabetes mellitus with stage 3 chronic kidney disease, without long-term current use of insulin  -     SITagliptin (JANUVIA) 50 MG Tab; Take 1 tablet (50 mg total) by mouth once daily.        - follows up with Dr. Sprague for CKD        - continue with current diabetes medications as prescribed (glipizde, pioglitazone, januvia)    Osteopenia, unspecified location    BMI 27.0-27.9,adult    Incontinence of feces with fecal urgency    Diarrhea, unspecified type  -     Stool culture; Future  -     Stool Exam-Ova,Cysts,Parasites; Future  -     WBC, Stool; Future    Dizziness    Chronic tension-type headache, not intractable  - Tylenol as needed for relief    Current mild episode of major depressive disorder without prior episode  - stable, continue prozac as prescribed    Counseled on importance of healthy diet and exercise.   Follow-up in 6 weeks with labs.

## 2018-04-30 NOTE — PROGRESS NOTES
Subjective:       Patient ID: Whit Sloan is a 77 y.o. female.    Chief Complaint: Follow-up (4 mos); Hypertension; Diabetes; Dizziness; and Hyperlipidemia    Hypertension   This is a chronic problem. The current episode started more than 1 year ago. The problem is unchanged. The problem is controlled. Associated symptoms include headaches. Pertinent negatives include no chest pain, palpitations or shortness of breath. The current treatment provides significant improvement. There are no compliance problems.    Diabetes   She presents for her follow-up diabetic visit. She has type 2 diabetes mellitus. Hypoglycemia symptoms include dizziness and headaches. Pertinent negatives for diabetes include no chest pain, no polydipsia, no polyphagia and no polyuria. She is compliant with treatment all of the time. Her weight is decreasing steadily. She is following a diabetic diet. Her breakfast blood glucose range is generally 110-130 mg/dl. An ACE inhibitor/angiotensin II receptor blocker is being taken. She does not see a podiatrist.Eye exam is current.   Hyperlipidemia   This is a chronic problem. The current episode started more than 1 year ago. Pertinent negatives include no chest pain or shortness of breath.   Dizziness:    Associated symptoms: headaches.no fever, no nausea, no vomiting, no palpitations and no chest pain.   PMH includes: environmental allergies.  Pt seen by Dr. Angie Sprague on 2/8/18. Calcitriol increased to 0.5 mcg daily due to hyperparathyroidism. Pt has stable hypertensive nephrosclerosis. No proteinuria. Pt with Dexa BMD on 2/14/18 which showed osteopenia of the femoral neck.  Pt c/o headaches for 3-4 weeks. Localized to frontal scalp. Last for 1 hour at a time. No meds have been taken. No falls, nausea, vomiting or visual changes. Has intermittent dizziness with changes in position.  Has noted 9 lbs of weight loss since last visit Dec 2017. Has decreased food intake. Is eating 2 times per  day.  Has noted bowel movements 4 times per day with associated fecal urgency. No blood in the stool or abdominal pain. Has intermittent fecal incontinence.  Pt notes feeling depressed mood. No SI or HI.  Review of Systems   Constitutional: Negative for chills and fever.   HENT: Positive for postnasal drip.    Respiratory: Positive for cough. Negative for shortness of breath.    Cardiovascular: Negative for chest pain, palpitations and leg swelling.   Gastrointestinal: Positive for diarrhea. Negative for anal bleeding, blood in stool, nausea and vomiting.   Endocrine: Positive for cold intolerance. Negative for polydipsia, polyphagia and polyuria.   Genitourinary: Negative for dysuria and hematuria.   Allergic/Immunologic: Positive for environmental allergies.   Neurological: Positive for dizziness and headaches.       Objective:      Physical Exam   Constitutional: She is oriented to person, place, and time. She appears well-developed and well-nourished. No distress.   HENT:   Head: Normocephalic and atraumatic.   Neck: Normal range of motion. Neck supple. No JVD present. No thyromegaly present.   Cardiovascular: Normal rate, regular rhythm, normal heart sounds and intact distal pulses.    Pulses:       Dorsalis pedis pulses are 2+ on the right side, and 2+ on the left side.        Posterior tibial pulses are 2+ on the right side, and 2+ on the left side.   Pulmonary/Chest: Effort normal and breath sounds normal. She has no wheezes. She has no rales.   Abdominal: Soft. Bowel sounds are normal. She exhibits no mass. There is no tenderness.   Musculoskeletal:        Right foot: There is normal range of motion and no deformity.        Left foot: There is normal range of motion and no deformity.   Feet:   Right Foot:   Protective Sensation: 10 sites tested. 10 sites sensed.   Skin Integrity: Positive for dry skin. Negative for ulcer, blister, skin breakdown, erythema, warmth or callus.   Left Foot:   Protective  Sensation: 10 sites tested. 10 sites sensed.   Skin Integrity: Positive for dry skin. Negative for ulcer, blister, skin breakdown, erythema, warmth or callus.   Lymphadenopathy:     She has no cervical adenopathy.   Neurological: She is alert and oriented to person, place, and time. She displays normal reflexes. No cranial nerve deficit or sensory deficit. She exhibits normal muscle tone. Coordination normal.   Skin: Skin is warm and dry. She is not diaphoretic.   Psychiatric: She has a normal mood and affect.   Vitals reviewed.      Assessment:         See plan  Plan:       Whit was seen today for follow-up, hypertension, diabetes, dizziness and hyperlipidemia.    Diagnoses and all orders for this visit:    Hypertensive kidney disease with chronic kidney disease stage III: Stable  - Continue f/u with Dr. Angie Sprague    DM type 2 with diabetic dyslipidemia: Stable    Type 2 diabetes mellitus with stage 3 chronic kidney disease, without long-term current use of insulin: Stable  -     SITagliptin (JANUVIA) 50 MG Tab; Take 1 tablet (50 mg total) by mouth once daily.    Osteopenia, unspecified location: Stable    BMI 27.0-27.9,adult  Weight loss advised. Dietary and exercise counseling done.    Incontinence of feces with fecal urgency  - Probable due to diarrhea    Diarrhea, unspecified type  -     Stool culture; Future  -     Stool Exam-Ova,Cysts,Parasites; Future  -     WBC, Stool; Future  -     Daily fiber supplements advised    Dizziness  - May be related to orthostasis or hypoglycemia. At least 3 meals daily advised.    Chronic tension-type headache, not intractable  - Tylenol as needed.    Current mild episode of major depressive disorder without prior episode: Stable    F/U in 6 weeks.

## 2018-05-03 ENCOUNTER — LAB VISIT (OUTPATIENT)
Dept: LAB | Facility: HOSPITAL | Age: 78
End: 2018-05-03
Attending: FAMILY MEDICINE
Payer: MEDICARE

## 2018-05-03 DIAGNOSIS — E78.5 DM TYPE 2 WITH DIABETIC DYSLIPIDEMIA: ICD-10-CM

## 2018-05-03 DIAGNOSIS — E11.69 DM TYPE 2 WITH DIABETIC DYSLIPIDEMIA: ICD-10-CM

## 2018-05-03 DIAGNOSIS — R19.7 DIARRHEA, UNSPECIFIED TYPE: ICD-10-CM

## 2018-05-03 PROCEDURE — 87427 SHIGA-LIKE TOXIN AG IA: CPT | Mod: 59

## 2018-05-03 PROCEDURE — 89055 LEUKOCYTE ASSESSMENT FECAL: CPT

## 2018-05-03 PROCEDURE — 87046 STOOL CULTR AEROBIC BACT EA: CPT

## 2018-05-03 PROCEDURE — 87209 SMEAR COMPLEX STAIN: CPT

## 2018-05-03 PROCEDURE — 87045 FECES CULTURE AEROBIC BACT: CPT

## 2018-05-04 LAB
O+P STL TRI STN: NORMAL
WBC #/AREA STL HPF: NORMAL /[HPF]

## 2018-05-04 RX ORDER — GLIPIZIDE 10 MG/1
TABLET ORAL
Qty: 180 TABLET | Refills: 1 | Status: SHIPPED | OUTPATIENT
Start: 2018-05-04 | End: 2018-12-06 | Stop reason: SDUPTHER

## 2018-05-07 LAB
BACTERIA STL CULT: NORMAL
E COLI SXT1 STL QL IA: NEGATIVE
E COLI SXT2 STL QL IA: NEGATIVE

## 2018-06-11 ENCOUNTER — OFFICE VISIT (OUTPATIENT)
Dept: FAMILY MEDICINE | Facility: CLINIC | Age: 78
End: 2018-06-11
Payer: MEDICARE

## 2018-06-11 VITALS
SYSTOLIC BLOOD PRESSURE: 124 MMHG | BODY MASS INDEX: 27.06 KG/M2 | HEART RATE: 63 BPM | OXYGEN SATURATION: 98 % | WEIGHT: 158.5 LBS | HEIGHT: 64 IN | DIASTOLIC BLOOD PRESSURE: 62 MMHG

## 2018-06-11 DIAGNOSIS — Z87.898 HISTORY OF DIZZINESS: ICD-10-CM

## 2018-06-11 DIAGNOSIS — Z87.898 HISTORY OF HEADACHE: ICD-10-CM

## 2018-06-11 DIAGNOSIS — N18.30 TYPE 2 DIABETES MELLITUS WITH STAGE 3 CHRONIC KIDNEY DISEASE, WITHOUT LONG-TERM CURRENT USE OF INSULIN: ICD-10-CM

## 2018-06-11 DIAGNOSIS — E11.22 TYPE 2 DIABETES MELLITUS WITH STAGE 3 CHRONIC KIDNEY DISEASE, WITHOUT LONG-TERM CURRENT USE OF INSULIN: ICD-10-CM

## 2018-06-11 DIAGNOSIS — I12.9 HYPERTENSIVE KIDNEY DISEASE WITH CHRONIC KIDNEY DISEASE STAGE III: Primary | ICD-10-CM

## 2018-06-11 DIAGNOSIS — R25.2 BILATERAL LEG CRAMPS: ICD-10-CM

## 2018-06-11 DIAGNOSIS — N18.30 HYPERTENSIVE KIDNEY DISEASE WITH CHRONIC KIDNEY DISEASE STAGE III: Primary | ICD-10-CM

## 2018-06-11 PROCEDURE — 99999 PR PBB SHADOW E&M-EST. PATIENT-LVL III: CPT | Mod: PBBFAC,,, | Performed by: FAMILY MEDICINE

## 2018-06-11 PROCEDURE — 99213 OFFICE O/P EST LOW 20 MIN: CPT | Mod: PBBFAC,PO | Performed by: FAMILY MEDICINE

## 2018-06-11 PROCEDURE — 99214 OFFICE O/P EST MOD 30 MIN: CPT | Mod: S$PBB,,, | Performed by: FAMILY MEDICINE

## 2018-06-11 RX ORDER — LANOLIN ALCOHOL/MO/W.PET/CERES
400 CREAM (GRAM) TOPICAL DAILY
Qty: 90 TABLET | Refills: 3 | COMMUNITY
Start: 2018-06-11 | End: 2020-02-06

## 2018-06-11 NOTE — MEDICAL/APP STUDENT
Subjective:       Patient ID: Whit Sloan is a 77 y.o. female.    Chief Complaint: Follow-up    HPI   Whit Sloan is a 77 y.o. old female w/ Hx of CKD stage 3, HTN, DM2, MDD and Hx of breast cancer coming in for 6 wk f/u.   In the last visit, she came in with headache and dizziness. Since the last visit she has not had any episodes of headache and dizziness. She has been slowing down with activity levels and seems it has been helping. She had social stressors such as sick , taking care of grandchildren and dog.     HTN - At home monitors 3x/week 120s/60s. No headache, vision changes, chest pain, palpitations, SOB, PND, and pitting edema. She is currently on ARB.    DM2 - She is compliant with medications - glipizide, pioglitazone, sitagliptin. She monitors BGL at home everyday and reads around 100's. No polydipsia, polyphagia or polyuria. No paresthesia, numbness or weakness. Takes ARB.    Hyperlipidemia - compliant wit medication - simvastatin.     Diet - eats home cooked meal containing fruits and vegetables. Eats fish and chicken. No sodas but drinks coffee.   Exercise - walks dog 2x/day - 30min everyday.     Review of Systems   Constitutional: Negative for activity change, chills and fever.   HENT: Negative for congestion and sore throat.    Eyes: Negative for photophobia and visual disturbance.   Respiratory: Positive for cough (occasional). Negative for shortness of breath.    Cardiovascular: Negative for chest pain, palpitations and leg swelling.   Gastrointestinal: Negative for blood in stool, constipation, diarrhea, nausea and vomiting.   Endocrine: Negative for polydipsia, polyphagia and polyuria.   Genitourinary: Negative for dysuria and hematuria.   Musculoskeletal: Positive for myalgias (occasional cramps on anterior right leg). Negative for arthralgias and joint swelling.   Skin: Negative for color change and rash.   Neurological: Negative for dizziness, syncope and numbness.    Psychiatric/Behavioral: Negative for dysphoric mood. The patient is not nervous/anxious.        Objective:      Physical Exam   Constitutional: She is oriented to person, place, and time. She appears well-developed and well-nourished.   HENT:   Head: Normocephalic and atraumatic.   Eyes: Conjunctivae and EOM are normal. Pupils are equal, round, and reactive to light.   Neck: Normal range of motion. Neck supple.   Cardiovascular: Normal rate, regular rhythm and normal heart sounds.    Pulmonary/Chest: Effort normal and breath sounds normal.   Musculoskeletal: Normal range of motion.   Neurological: She is alert and oriented to person, place, and time.   Skin: Skin is warm. No erythema.       Assessment:       1. Hypertensive kidney disease with chronic kidney disease stage III    2. Type 2 diabetes mellitus with stage 3 chronic kidney disease, without long-term current use of insulin    3. History of dizziness    4. History of headache    5. Bilateral leg cramps        Plan:      1. CKD3  - f/u with Dr. Angie Sprague    2. HTN  - cont ARB and current diet and exercise    3. DM2  - cont current regimen    4. Osteopenia  - rec resistance exercises    5. Bilateral leg cramps  - avoid dehydration and rec OTC Mg 400mg/d    6. Come back in 3 mo    Thank you for the opportunity to assist in the care of your patient.    Rhiannon Skinner   MS4  [place] [facility]

## 2018-06-11 NOTE — PROGRESS NOTES
Subjective:       Patient ID: Whit Sloan is a 77 y.o. female.    Chief Complaint: Follow-up    HPI   76 yo female with HTN, DM, Type 2, CKD, Stage 3 presents for f/u headaches and dizziness. HA and dizziness have resolved. Her stressors have resolved. Ambulatory BPs are controlled. Is compliant with all medications. Checks BG daily. Fasting BGs average 100. Pt is following a diabetic diet. Walks her dog twice daily for 30 minutes.  Diarrhea has resolved. All stool studies were negative.  To have eye exam with Dr. Min on 6/13/18.  Review of Systems   Respiratory: Positive for cough. Negative for shortness of breath.    Cardiovascular: Negative for chest pain, palpitations and leg swelling.   Endocrine: Negative for polydipsia, polyphagia and polyuria.   Musculoskeletal:        Occasional anterior leg cramps. Worse in the morning. Exacerbated by stretching.   Neurological: Negative for dizziness and headaches.       Objective:      Physical Exam   Constitutional: She is oriented to person, place, and time. She appears well-developed and well-nourished. No distress.   HENT:   Head: Normocephalic and atraumatic.   Eyes: Conjunctivae and EOM are normal. Pupils are equal, round, and reactive to light. Right eye exhibits no discharge. Left eye exhibits no discharge.   Neck: Normal range of motion. Neck supple. No thyromegaly present.   Cardiovascular: Normal rate, regular rhythm, normal heart sounds and intact distal pulses.    No murmur heard.  Pulmonary/Chest: Effort normal and breath sounds normal. She has no wheezes. She has no rales.   Abdominal: Soft. Bowel sounds are normal. She exhibits no mass. There is no tenderness.   Lymphadenopathy:     She has no cervical adenopathy.   Neurological: She is alert and oriented to person, place, and time.   Skin: Skin is warm and dry. She is not diaphoretic.   Vitals reviewed.      Assessment:       See plan  Plan:       Whit was seen today for follow-up.    Diagnoses  and all orders for this visit:    Hypertensive kidney disease with chronic kidney disease stage III: Stable    Type 2 diabetes mellitus with stage 3 chronic kidney disease, without long-term current use of insulin: Stable  -     Comprehensive metabolic panel; Future  -     Hemoglobin A1c; Future    History of dizziness    History of headache    Bilateral leg cramps  -     magnesium oxide (MAG-OX) 400 mg tablet; Take 1 tablet (400 mg total) by mouth once daily.    F/U in 3 months.

## 2018-06-25 DIAGNOSIS — I15.2 HYPERTENSION ASSOCIATED WITH DIABETES: ICD-10-CM

## 2018-06-25 DIAGNOSIS — E11.59 HYPERTENSION ASSOCIATED WITH DIABETES: ICD-10-CM

## 2018-06-25 RX ORDER — LOSARTAN POTASSIUM 100 MG/1
TABLET ORAL
Qty: 90 TABLET | Refills: 1 | Status: SHIPPED | OUTPATIENT
Start: 2018-06-25 | End: 2019-01-07 | Stop reason: SDUPTHER

## 2018-08-21 DIAGNOSIS — Z17.0 MALIGNANT NEOPLASM OF BREAST IN FEMALE, ESTROGEN RECEPTOR POSITIVE, UNSPECIFIED LATERALITY, UNSPECIFIED SITE OF BREAST: ICD-10-CM

## 2018-08-21 DIAGNOSIS — C50.919 MALIGNANT NEOPLASM OF BREAST IN FEMALE, ESTROGEN RECEPTOR POSITIVE, UNSPECIFIED LATERALITY, UNSPECIFIED SITE OF BREAST: ICD-10-CM

## 2018-08-21 RX ORDER — TAMOXIFEN CITRATE 20 MG/1
TABLET ORAL
Qty: 30 TABLET | Refills: 0 | Status: SHIPPED | OUTPATIENT
Start: 2018-08-21 | End: 2018-09-21 | Stop reason: SDUPTHER

## 2018-09-06 ENCOUNTER — LAB VISIT (OUTPATIENT)
Dept: LAB | Facility: HOSPITAL | Age: 78
End: 2018-09-06
Attending: FAMILY MEDICINE
Payer: MEDICARE

## 2018-09-06 DIAGNOSIS — N18.30 TYPE 2 DIABETES MELLITUS WITH STAGE 3 CHRONIC KIDNEY DISEASE, WITHOUT LONG-TERM CURRENT USE OF INSULIN: ICD-10-CM

## 2018-09-06 DIAGNOSIS — E11.22 TYPE 2 DIABETES MELLITUS WITH STAGE 3 CHRONIC KIDNEY DISEASE, WITHOUT LONG-TERM CURRENT USE OF INSULIN: ICD-10-CM

## 2018-09-06 LAB
ALBUMIN SERPL BCP-MCNC: 3.3 G/DL
ALP SERPL-CCNC: 62 U/L
ALT SERPL W/O P-5'-P-CCNC: 10 U/L
ANION GAP SERPL CALC-SCNC: 8 MMOL/L
AST SERPL-CCNC: 17 U/L
BILIRUB SERPL-MCNC: 0.6 MG/DL
BUN SERPL-MCNC: 42 MG/DL
CALCIUM SERPL-MCNC: 10.2 MG/DL
CHLORIDE SERPL-SCNC: 107 MMOL/L
CO2 SERPL-SCNC: 25 MMOL/L
CREAT SERPL-MCNC: 2.3 MG/DL
EST. GFR  (AFRICAN AMERICAN): 22.6 ML/MIN/1.73 M^2
EST. GFR  (NON AFRICAN AMERICAN): 19.6 ML/MIN/1.73 M^2
ESTIMATED AVG GLUCOSE: 154 MG/DL
GLUCOSE SERPL-MCNC: 118 MG/DL
HBA1C MFR BLD HPLC: 7 %
POTASSIUM SERPL-SCNC: 5.3 MMOL/L
PROT SERPL-MCNC: 7.2 G/DL
SODIUM SERPL-SCNC: 140 MMOL/L

## 2018-09-06 PROCEDURE — 36415 COLL VENOUS BLD VENIPUNCTURE: CPT | Mod: PO

## 2018-09-06 PROCEDURE — 80053 COMPREHEN METABOLIC PANEL: CPT

## 2018-09-06 PROCEDURE — 83036 HEMOGLOBIN GLYCOSYLATED A1C: CPT

## 2018-09-12 ENCOUNTER — OFFICE VISIT (OUTPATIENT)
Dept: FAMILY MEDICINE | Facility: CLINIC | Age: 78
End: 2018-09-12
Payer: MEDICARE

## 2018-09-12 VITALS
WEIGHT: 158.94 LBS | OXYGEN SATURATION: 97 % | HEIGHT: 64 IN | HEART RATE: 76 BPM | DIASTOLIC BLOOD PRESSURE: 60 MMHG | BODY MASS INDEX: 27.13 KG/M2 | SYSTOLIC BLOOD PRESSURE: 118 MMHG

## 2018-09-12 DIAGNOSIS — E11.69 DM TYPE 2 WITH DIABETIC DYSLIPIDEMIA: ICD-10-CM

## 2018-09-12 DIAGNOSIS — C50.311 MALIGNANT NEOPLASM OF LOWER-INNER QUADRANT OF RIGHT BREAST OF FEMALE, ESTROGEN RECEPTOR POSITIVE: ICD-10-CM

## 2018-09-12 DIAGNOSIS — Z79.810 USE OF TAMOXIFEN (NOLVADEX): ICD-10-CM

## 2018-09-12 DIAGNOSIS — N18.4 TYPE 2 DIABETES MELLITUS WITH STAGE 4 CHRONIC KIDNEY DISEASE, WITHOUT LONG-TERM CURRENT USE OF INSULIN: ICD-10-CM

## 2018-09-12 DIAGNOSIS — Z17.0 MALIGNANT NEOPLASM OF LOWER-INNER QUADRANT OF RIGHT BREAST OF FEMALE, ESTROGEN RECEPTOR POSITIVE: ICD-10-CM

## 2018-09-12 DIAGNOSIS — N18.4 HYPERTENSIVE KIDNEY DISEASE WITH CHRONIC KIDNEY DISEASE STAGE IV: Primary | ICD-10-CM

## 2018-09-12 DIAGNOSIS — F32.0 CURRENT MILD EPISODE OF MAJOR DEPRESSIVE DISORDER WITHOUT PRIOR EPISODE: ICD-10-CM

## 2018-09-12 DIAGNOSIS — E11.22 TYPE 2 DIABETES MELLITUS WITH STAGE 4 CHRONIC KIDNEY DISEASE, WITHOUT LONG-TERM CURRENT USE OF INSULIN: ICD-10-CM

## 2018-09-12 DIAGNOSIS — E78.5 DM TYPE 2 WITH DIABETIC DYSLIPIDEMIA: ICD-10-CM

## 2018-09-12 DIAGNOSIS — I12.9 HYPERTENSIVE KIDNEY DISEASE WITH CHRONIC KIDNEY DISEASE STAGE IV: Primary | ICD-10-CM

## 2018-09-12 DIAGNOSIS — Z23 NEED FOR INFLUENZA VACCINATION: ICD-10-CM

## 2018-09-12 PROCEDURE — 99999 PR PBB SHADOW E&M-EST. PATIENT-LVL III: CPT | Mod: PBBFAC,,, | Performed by: FAMILY MEDICINE

## 2018-09-12 PROCEDURE — 99213 OFFICE O/P EST LOW 20 MIN: CPT | Mod: PBBFAC,PO | Performed by: FAMILY MEDICINE

## 2018-09-12 PROCEDURE — 99214 OFFICE O/P EST MOD 30 MIN: CPT | Mod: S$PBB,,, | Performed by: FAMILY MEDICINE

## 2018-09-12 PROCEDURE — 90662 IIV NO PRSV INCREASED AG IM: CPT | Mod: PBBFAC,PO

## 2018-09-12 RX ORDER — CALCITRIOL 0.5 UG/1
CAPSULE ORAL
Refills: 11 | COMMUNITY
Start: 2018-09-10 | End: 2021-04-27

## 2018-09-12 NOTE — PROGRESS NOTES
Subjective:       Patient ID: Whit Sloan is a 78 y.o. female.    Chief Complaint: Follow-up (3 mos); Hypertension; Diabetes; Hyperlipidemia; and Chronic Kidney Disease    Hypertension   This is a chronic problem. The current episode started more than 1 year ago. The problem is unchanged. The problem is controlled. Associated symptoms include peripheral edema. Pertinent negatives include no blurred vision, chest pain, headaches, orthopnea, palpitations, PND or shortness of breath. Past treatments include angiotensin blockers and diuretics. The current treatment provides significant improvement. There are no compliance problems.  Hypertensive end-organ damage includes kidney disease. Identifiable causes of hypertension include chronic renal disease.   Diabetes   She presents for her follow-up diabetic visit. She has type 2 diabetes mellitus. There are no hypoglycemic associated symptoms. Pertinent negatives for hypoglycemia include no headaches. Pertinent negatives for diabetes include no blurred vision, no chest pain, no fatigue, no foot paresthesias, no foot ulcerations, no polydipsia, no polyphagia, no polyuria, no visual change, no weakness and no weight loss. There are no hypoglycemic complications. Current diabetic treatment includes oral agent (dual therapy). She is compliant with treatment all of the time. Her weight is stable. She participates in exercise daily. An ACE inhibitor/angiotensin II receptor blocker is being taken. Eye exam is current.   Hyperlipidemia   This is a chronic problem. The current episode started more than 1 year ago. The problem is controlled. Recent lipid tests were reviewed and are normal. Exacerbating diseases include chronic renal disease and diabetes. She has no history of hypothyroidism, liver disease, obesity or nephrotic syndrome. Pertinent negatives include no chest pain, focal sensory loss, focal weakness, leg pain, myalgias or shortness of breath. Current  antihyperlipidemic treatment includes statins. The current treatment provides significant improvement of lipids. There are no compliance problems.    Is checking BGs bid. Fasting BGs range from 130-160. Evening BGs range from 135-165.  Pt is compliant with Fluoxetine 10 mg daily. Mood is good. Denies SI or HI. Sleeps well.  Pt is followed by Dr. Sprague for CKD, Stage 4. Last visit August 2018 and will f/u November 2018.  Pt is on Tamoxifen for right breast CA. Is followed by Hem/Onc.  Results for orders placed or performed in visit on 09/06/18   Comprehensive metabolic panel   Result Value Ref Range    Sodium 140 136 - 145 mmol/L    Potassium 5.3 (H) 3.5 - 5.1 mmol/L    Chloride 107 95 - 110 mmol/L    CO2 25 23 - 29 mmol/L    Glucose 118 (H) 70 - 110 mg/dL    BUN, Bld 42 (H) 8 - 23 mg/dL    Creatinine 2.3 (H) 0.5 - 1.4 mg/dL    Calcium 10.2 8.7 - 10.5 mg/dL    Total Protein 7.2 6.0 - 8.4 g/dL    Albumin 3.3 (L) 3.5 - 5.2 g/dL    Total Bilirubin 0.6 0.1 - 1.0 mg/dL    Alkaline Phosphatase 62 55 - 135 U/L    AST 17 10 - 40 U/L    ALT 10 10 - 44 U/L    Anion Gap 8 8 - 16 mmol/L    eGFR if African American 22.6 (A) >60 mL/min/1.73 m^2    eGFR if non African American 19.6 (A) >60 mL/min/1.73 m^2   Hemoglobin A1c   Result Value Ref Range    Hemoglobin A1C 7.0 (H) 4.0 - 5.6 %    Estimated Avg Glucose 154 (H) 68 - 131 mg/dL     Review of Systems   Constitutional: Negative for fatigue and weight loss.   Eyes: Negative for blurred vision.   Respiratory: Negative for shortness of breath.    Cardiovascular: Positive for leg swelling. Negative for chest pain, palpitations, orthopnea and PND.   Gastrointestinal: Positive for abdominal pain. Negative for anal bleeding, blood in stool, constipation, diarrhea, nausea and vomiting.   Endocrine: Negative for polydipsia, polyphagia and polyuria.   Genitourinary: Negative for dysuria and hematuria.   Musculoskeletal: Negative for myalgias.   Neurological: Negative for focal weakness,  weakness and headaches.       Objective:      Physical Exam   Constitutional: She is oriented to person, place, and time. She appears well-developed and well-nourished. No distress.   HENT:   Head: Normocephalic and atraumatic.   Neck: Normal range of motion. Neck supple. No JVD present. No thyromegaly present.   Cardiovascular: Normal rate, regular rhythm, normal heart sounds and intact distal pulses. Exam reveals no friction rub.   No murmur heard.  Pulmonary/Chest: Effort normal and breath sounds normal. She has no wheezes. She has no rales.   Abdominal: Soft. Bowel sounds are normal. She exhibits no mass. There is no tenderness.   Lymphadenopathy:     She has no cervical adenopathy.   Neurological: She is alert and oriented to person, place, and time.   Skin: Skin is warm and dry. She is not diaphoretic.   Psychiatric: She has a normal mood and affect.   Vitals reviewed.      Assessment:         See plan  Plan:       Whit was seen today for follow-up, hypertension, diabetes, hyperlipidemia and chronic kidney disease.    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

## 2018-09-21 DIAGNOSIS — Z17.0 MALIGNANT NEOPLASM OF BREAST IN FEMALE, ESTROGEN RECEPTOR POSITIVE, UNSPECIFIED LATERALITY, UNSPECIFIED SITE OF BREAST: ICD-10-CM

## 2018-09-21 DIAGNOSIS — C50.919 MALIGNANT NEOPLASM OF BREAST IN FEMALE, ESTROGEN RECEPTOR POSITIVE, UNSPECIFIED LATERALITY, UNSPECIFIED SITE OF BREAST: ICD-10-CM

## 2018-09-21 RX ORDER — TAMOXIFEN CITRATE 20 MG/1
TABLET ORAL
Qty: 90 TABLET | Refills: 4 | Status: SHIPPED | OUTPATIENT
Start: 2018-09-21 | End: 2019-10-14 | Stop reason: SDUPTHER

## 2018-12-06 DIAGNOSIS — E78.5 DM TYPE 2 WITH DIABETIC DYSLIPIDEMIA: ICD-10-CM

## 2018-12-06 DIAGNOSIS — E11.69 DM TYPE 2 WITH DIABETIC DYSLIPIDEMIA: ICD-10-CM

## 2018-12-06 RX ORDER — GLIPIZIDE 10 MG/1
TABLET ORAL
Qty: 180 TABLET | Refills: 0 | Status: SHIPPED | OUTPATIENT
Start: 2018-12-06 | End: 2019-04-15 | Stop reason: SDUPTHER

## 2018-12-07 ENCOUNTER — HOSPITAL ENCOUNTER (OUTPATIENT)
Dept: RADIOLOGY | Facility: HOSPITAL | Age: 78
Discharge: HOME | End: 2018-12-07
Attending: PHYSICIAN ASSISTANT
Payer: MEDICARE

## 2018-12-07 VITALS — HEIGHT: 64 IN | BODY MASS INDEX: 26.98 KG/M2 | WEIGHT: 158 LBS

## 2018-12-07 DIAGNOSIS — C50.311 MALIGNANT NEOPLASM OF LOWER-INNER QUADRANT OF RIGHT BREAST OF FEMALE, ESTROGEN RECEPTOR POSITIVE: ICD-10-CM

## 2018-12-07 DIAGNOSIS — Z17.0 MALIGNANT NEOPLASM OF LOWER-INNER QUADRANT OF RIGHT BREAST OF FEMALE, ESTROGEN RECEPTOR POSITIVE: ICD-10-CM

## 2018-12-07 PROCEDURE — 77066 DX MAMMO INCL CAD BI: CPT | Mod: 26,,, | Performed by: RADIOLOGY

## 2018-12-07 PROCEDURE — 77062 BREAST TOMOSYNTHESIS BI: CPT | Mod: 26,,, | Performed by: RADIOLOGY

## 2018-12-07 PROCEDURE — 77066 DX MAMMO INCL CAD BI: CPT | Mod: TC,PO

## 2019-01-07 DIAGNOSIS — I15.2 HYPERTENSION ASSOCIATED WITH DIABETES: ICD-10-CM

## 2019-01-07 DIAGNOSIS — E11.59 HYPERTENSION ASSOCIATED WITH DIABETES: ICD-10-CM

## 2019-01-07 RX ORDER — LOSARTAN POTASSIUM 100 MG/1
TABLET ORAL
Qty: 90 TABLET | Refills: 0 | Status: SHIPPED | OUTPATIENT
Start: 2019-01-07 | End: 2019-05-10 | Stop reason: SDUPTHER

## 2019-01-14 ENCOUNTER — LAB VISIT (OUTPATIENT)
Dept: LAB | Facility: HOSPITAL | Age: 79
End: 2019-01-14
Attending: FAMILY MEDICINE
Payer: MEDICARE

## 2019-01-14 DIAGNOSIS — E11.69 DM TYPE 2 WITH DIABETIC DYSLIPIDEMIA: ICD-10-CM

## 2019-01-14 DIAGNOSIS — E78.5 DM TYPE 2 WITH DIABETIC DYSLIPIDEMIA: ICD-10-CM

## 2019-01-14 LAB
ALBUMIN SERPL BCP-MCNC: 3.2 G/DL
ALP SERPL-CCNC: 47 U/L
ALT SERPL W/O P-5'-P-CCNC: 10 U/L
ANION GAP SERPL CALC-SCNC: 6 MMOL/L
AST SERPL-CCNC: 19 U/L
BILIRUB SERPL-MCNC: 0.6 MG/DL
BUN SERPL-MCNC: 30 MG/DL
CALCIUM SERPL-MCNC: 9.8 MG/DL
CHLORIDE SERPL-SCNC: 108 MMOL/L
CHOLEST SERPL-MCNC: 169 MG/DL
CHOLEST/HDLC SERPL: 2.6 {RATIO}
CO2 SERPL-SCNC: 26 MMOL/L
CREAT SERPL-MCNC: 1.9 MG/DL
EST. GFR  (AFRICAN AMERICAN): 28.5 ML/MIN/1.73 M^2
EST. GFR  (NON AFRICAN AMERICAN): 24.7 ML/MIN/1.73 M^2
ESTIMATED AVG GLUCOSE: 169 MG/DL
GLUCOSE SERPL-MCNC: 103 MG/DL
HBA1C MFR BLD HPLC: 7.5 %
HDLC SERPL-MCNC: 64 MG/DL
HDLC SERPL: 37.9 %
LDLC SERPL CALC-MCNC: 91.8 MG/DL
NONHDLC SERPL-MCNC: 105 MG/DL
POTASSIUM SERPL-SCNC: 4.7 MMOL/L
PROT SERPL-MCNC: 7 G/DL
SODIUM SERPL-SCNC: 140 MMOL/L
TRIGL SERPL-MCNC: 66 MG/DL

## 2019-01-14 PROCEDURE — 80061 LIPID PANEL: CPT

## 2019-01-14 PROCEDURE — 83036 HEMOGLOBIN GLYCOSYLATED A1C: CPT

## 2019-01-14 PROCEDURE — 80053 COMPREHEN METABOLIC PANEL: CPT

## 2019-01-14 PROCEDURE — 36415 COLL VENOUS BLD VENIPUNCTURE: CPT | Mod: PO

## 2019-01-17 ENCOUNTER — OFFICE VISIT (OUTPATIENT)
Dept: FAMILY MEDICINE | Facility: CLINIC | Age: 79
End: 2019-01-17
Payer: MEDICARE

## 2019-01-17 VITALS
HEART RATE: 66 BPM | SYSTOLIC BLOOD PRESSURE: 118 MMHG | DIASTOLIC BLOOD PRESSURE: 60 MMHG | OXYGEN SATURATION: 97 % | BODY MASS INDEX: 27.13 KG/M2 | WEIGHT: 158.94 LBS | HEIGHT: 64 IN

## 2019-01-17 DIAGNOSIS — E78.5 DM TYPE 2 WITH DIABETIC DYSLIPIDEMIA: ICD-10-CM

## 2019-01-17 DIAGNOSIS — E11.69 DM TYPE 2 WITH DIABETIC DYSLIPIDEMIA: ICD-10-CM

## 2019-01-17 DIAGNOSIS — F32.0 CURRENT MILD EPISODE OF MAJOR DEPRESSIVE DISORDER WITHOUT PRIOR EPISODE: ICD-10-CM

## 2019-01-17 DIAGNOSIS — Z17.0 MALIGNANT NEOPLASM OF LOWER-INNER QUADRANT OF RIGHT BREAST OF FEMALE, ESTROGEN RECEPTOR POSITIVE: ICD-10-CM

## 2019-01-17 DIAGNOSIS — Z79.810 USE OF TAMOXIFEN (NOLVADEX): ICD-10-CM

## 2019-01-17 DIAGNOSIS — I12.9 HYPERTENSIVE KIDNEY DISEASE WITH CHRONIC KIDNEY DISEASE STAGE IV: Primary | ICD-10-CM

## 2019-01-17 DIAGNOSIS — N18.4 HYPERTENSIVE KIDNEY DISEASE WITH CHRONIC KIDNEY DISEASE STAGE IV: Primary | ICD-10-CM

## 2019-01-17 DIAGNOSIS — C50.311 MALIGNANT NEOPLASM OF LOWER-INNER QUADRANT OF RIGHT BREAST OF FEMALE, ESTROGEN RECEPTOR POSITIVE: ICD-10-CM

## 2019-01-17 DIAGNOSIS — N18.4 TYPE 2 DIABETES MELLITUS WITH STAGE 4 CHRONIC KIDNEY DISEASE, WITHOUT LONG-TERM CURRENT USE OF INSULIN: ICD-10-CM

## 2019-01-17 DIAGNOSIS — E11.22 TYPE 2 DIABETES MELLITUS WITH STAGE 4 CHRONIC KIDNEY DISEASE, WITHOUT LONG-TERM CURRENT USE OF INSULIN: ICD-10-CM

## 2019-01-17 PROCEDURE — 99214 OFFICE O/P EST MOD 30 MIN: CPT | Mod: S$PBB,,, | Performed by: FAMILY MEDICINE

## 2019-01-17 PROCEDURE — 99999 PR PBB SHADOW E&M-EST. PATIENT-LVL III: ICD-10-PCS | Mod: PBBFAC,,, | Performed by: FAMILY MEDICINE

## 2019-01-17 PROCEDURE — 99213 OFFICE O/P EST LOW 20 MIN: CPT | Mod: PBBFAC,PO | Performed by: FAMILY MEDICINE

## 2019-01-17 PROCEDURE — 99214 PR OFFICE/OUTPT VISIT, EST, LEVL IV, 30-39 MIN: ICD-10-PCS | Mod: S$PBB,,, | Performed by: FAMILY MEDICINE

## 2019-01-17 PROCEDURE — 99999 PR PBB SHADOW E&M-EST. PATIENT-LVL III: CPT | Mod: PBBFAC,,, | Performed by: FAMILY MEDICINE

## 2019-01-17 NOTE — PROGRESS NOTES
Subjective:       Patient ID: Whit Sloan is a 78 y.o. female.    Chief Complaint: Follow-up (4 mos); Hypertension; Hyperlipidemia; Diabetes; and Chronic Kidney Disease    Hypertension   This is a chronic problem. The current episode started more than 1 year ago. The problem is unchanged. The problem is controlled. Pertinent negatives include no blurred vision, chest pain, headaches, orthopnea, palpitations, peripheral edema, PND or shortness of breath. The current treatment provides significant improvement. There are no compliance problems.  Identifiable causes of hypertension include chronic renal disease.   Hyperlipidemia   This is a chronic problem. The current episode started more than 1 year ago. The problem is controlled. Recent lipid tests were reviewed and are normal. Exacerbating diseases include chronic renal disease and diabetes. She has no history of hypothyroidism, liver disease, obesity or nephrotic syndrome. Pertinent negatives include no chest pain, focal sensory loss, focal weakness, leg pain, myalgias or shortness of breath. Current antihyperlipidemic treatment includes statins. The current treatment provides significant improvement of lipids.   Diabetes   She presents for her follow-up diabetic visit. She has type 2 diabetes mellitus. There are no hypoglycemic associated symptoms. Pertinent negatives for hypoglycemia include no headaches. Pertinent negatives for diabetes include no blurred vision, no chest pain, no fatigue, no foot paresthesias, no foot ulcerations, no polydipsia, no polyphagia, no polyuria, no visual change, no weakness and no weight loss. There are no hypoglycemic complications. Diabetic complications include nephropathy. Current diabetic treatment includes oral agent (triple therapy). She is compliant with treatment all of the time. Her weight is stable. She is following a generally healthy diet.   Pt is followed by Dr. Angie Sprague for CKD, Stage 4. Last visit 11/15/18. Pt  to f/u Feb 2019.  To see Dr. Min on 1/23/19. Pt is followed by Hem/Onc for breast CA. Normal mammogram Dec 2018.  Pt has a good mood. Is compliant with Fluoxetine 10 mg daily. No SI or HI.  Results for orders placed or performed in visit on 01/14/19   Hemoglobin A1c   Result Value Ref Range    Hemoglobin A1C 7.5 (H) 4.0 - 5.6 %    Estimated Avg Glucose 169 (H) 68 - 131 mg/dL   Comprehensive metabolic panel   Result Value Ref Range    Sodium 140 136 - 145 mmol/L    Potassium 4.7 3.5 - 5.1 mmol/L    Chloride 108 95 - 110 mmol/L    CO2 26 23 - 29 mmol/L    Glucose 103 70 - 110 mg/dL    BUN, Bld 30 (H) 8 - 23 mg/dL    Creatinine 1.9 (H) 0.5 - 1.4 mg/dL    Calcium 9.8 8.7 - 10.5 mg/dL    Total Protein 7.0 6.0 - 8.4 g/dL    Albumin 3.2 (L) 3.5 - 5.2 g/dL    Total Bilirubin 0.6 0.1 - 1.0 mg/dL    Alkaline Phosphatase 47 (L) 55 - 135 U/L    AST 19 10 - 40 U/L    ALT 10 10 - 44 U/L    Anion Gap 6 (L) 8 - 16 mmol/L    eGFR if  28.5 (A) >60 mL/min/1.73 m^2    eGFR if non  24.7 (A) >60 mL/min/1.73 m^2   Lipid panel   Result Value Ref Range    Cholesterol 169 120 - 199 mg/dL    Triglycerides 66 30 - 150 mg/dL    HDL 64 40 - 75 mg/dL    LDL Cholesterol 91.8 63.0 - 159.0 mg/dL    HDL/Chol Ratio 37.9 20.0 - 50.0 %    Total Cholesterol/HDL Ratio 2.6 2.0 - 5.0    Non-HDL Cholesterol 105 mg/dL     Review of Systems   Constitutional: Negative for fatigue and weight loss.   Eyes: Negative for blurred vision.   Respiratory: Negative for shortness of breath.    Cardiovascular: Negative for chest pain, palpitations, orthopnea and PND.   Endocrine: Negative for polydipsia, polyphagia and polyuria.   Musculoskeletal: Negative for myalgias.   Neurological: Negative for focal weakness, weakness and headaches.       Objective:      Physical Exam   Constitutional: She is oriented to person, place, and time. She appears well-developed and well-nourished. No distress.   HENT:   Head: Normocephalic and atraumatic.    Neck: Normal range of motion. Neck supple. No JVD present. No thyromegaly present.   Cardiovascular: Normal rate, regular rhythm, normal heart sounds and intact distal pulses. Exam reveals no gallop and no friction rub.   No murmur heard.  Pulmonary/Chest: Effort normal and breath sounds normal. She has no wheezes. She has no rales.   Abdominal: Soft. Bowel sounds are normal. She exhibits no mass. There is no tenderness.   Lymphadenopathy:     She has no cervical adenopathy.   Neurological: She is alert and oriented to person, place, and time.   Skin: Skin is warm and dry. She is not diaphoretic.   Vitals reviewed.      Assessment:         See plan  Plan:       Whit was seen today for follow-up, hypertension, hyperlipidemia, diabetes and chronic kidney disease.    Diagnoses and all orders for this visit:    Hypertensive kidney disease with chronic kidney disease stage IV: Stable  - Continue f/u with Dr. Angie Sprague    DM type 2 with diabetic dyslipidemia: Stable  - Diabetic diet advised along with regular exercise    Type 2 diabetes mellitus with stage 4 chronic kidney disease, without long-term current use of insulin: Stable  -     Comprehensive metabolic panel; Future  -     Hemoglobin A1c; Future    BMI 27.0-27.9,adult  The patient's BMI has been recorded in the chart. The patient has been provided educational materials regarding the benefits of attaining and maintaining a normal weight. We will continue to address and follow this issue during follow up visits.    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

## 2019-03-24 ENCOUNTER — HOSPITAL ENCOUNTER (EMERGENCY)
Facility: HOSPITAL | Age: 79
Discharge: HOME | End: 2019-03-24
Attending: EMERGENCY MEDICINE
Payer: MEDICARE

## 2019-03-24 VITALS
WEIGHT: 157 LBS | HEART RATE: 95 BPM | OXYGEN SATURATION: 97 % | HEIGHT: 64 IN | SYSTOLIC BLOOD PRESSURE: 101 MMHG | TEMPERATURE: 99 F | RESPIRATION RATE: 18 BRPM | DIASTOLIC BLOOD PRESSURE: 54 MMHG | BODY MASS INDEX: 26.8 KG/M2

## 2019-03-24 DIAGNOSIS — R05.9 COUGH: ICD-10-CM

## 2019-03-24 DIAGNOSIS — M16.10 HIP ARTHRITIS: Primary | ICD-10-CM

## 2019-03-24 DIAGNOSIS — M25.551 RIGHT HIP PAIN: ICD-10-CM

## 2019-03-24 PROCEDURE — 99284 PR EMERGENCY DEPT VISIT,LEVEL IV: ICD-10-PCS | Mod: ,,, | Performed by: EMERGENCY MEDICINE

## 2019-03-24 PROCEDURE — 25000003 PHARM REV CODE 250: Performed by: PHYSICIAN ASSISTANT

## 2019-03-24 PROCEDURE — 99284 EMERGENCY DEPT VISIT MOD MDM: CPT | Mod: ,,, | Performed by: EMERGENCY MEDICINE

## 2019-03-24 PROCEDURE — 99284 EMERGENCY DEPT VISIT MOD MDM: CPT

## 2019-03-24 RX ORDER — ACETAMINOPHEN 500 MG
1000 TABLET ORAL
Status: COMPLETED | OUTPATIENT
Start: 2019-03-24 | End: 2019-03-24

## 2019-03-24 RX ORDER — LIDOCAINE 50 MG/G
1 PATCH TOPICAL ONCE
Status: DISCONTINUED | OUTPATIENT
Start: 2019-03-24 | End: 2019-03-24 | Stop reason: HOSPADM

## 2019-03-24 RX ADMIN — ACETAMINOPHEN 1000 MG: 500 TABLET ORAL at 02:03

## 2019-03-24 RX ADMIN — LIDOCAINE 1 PATCH: 50 PATCH TOPICAL at 02:03

## 2019-03-24 NOTE — DISCHARGE INSTRUCTIONS
Continue to take tylenol 650 - 1000 mg every 8 hours on a scheduled basis for your arthritis. Call and follow up with primary care physician if your symptoms do not improve or worsen.     Future Appointments   Date Time Provider Department [place]   4/12/2019  9:00 AM [facility]   4/18/2019  2:00 PM Ingrid Meyers MD [facility]       Our goal in the emergency department is to always give you outstanding care and exceptional service. You may receive a survey by mail or e-mail in the next week regarding your experience in our ED. We would greatly appreciate your completing and returning the survey. Your feedback provides us with a way to recognize our staff who give very good care and it helps us learn how to improve when your experience was below our aspiration of excellence.

## 2019-03-24 NOTE — ED PROVIDER NOTES
"Encounter Date: 3/24/2019       History     Chief Complaint   Patient presents with    Back Pain     radiates from neck, down back, into buttock and right hip since Thursday. Denies relief with OTC medications.     Cough     Patient also reports cough and sneezing.     2:02 PM    Patient is a 78-year-old female with a past medical history of DM 2, CKD, HTN, depression who presents the ED with right hip pain and cough.  Patient states that her right hip pain started 2 days ago.  She states that she has some pain from her neck that radiates down into her buttocks and back, but most of her pain is in her right hip. She complains of 9/10 pain. She denies any injury or trauma.  She states movement exacerbates her pain. She last had a Tylenol 2 days ago that improved her symptoms.  She states that she has had this pain before due to "arthritis", but states that this pain is worse than usual. She has a cough, rhinnorhea, sneezing for weeks now, unchanged.        Review of patient's allergies indicates:  No Known Allergies  Past Medical History:   Diagnosis Date    Breast cancer 2016    Right breast    CKD (chronic kidney disease) stage 3, GFR 30-59 ml/min     Depressive disorder, not elsewhere classified     Disorder of bone and cartilage, unspecified     Hypertension     Other and unspecified hyperlipidemia     Recurrent nephrolithiasis     Type II or unspecified type diabetes mellitus without mention of complication, uncontrolled      Past Surgical History:   Procedure Laterality Date    BIOPSY-SENTINEL NODE Right 6/2/2016    Performed by [facility] at [facility]    BREAST BIOPSY Right 2016    BREAST LUMPECTOMY Right 2016    HYSTERECTOMY      one ovary/left    INJECTION-SENTINEL NODE Right 6/2/2016    Performed by [facility] at [facility]    LUMPECTOMY-BREAST-w/wire localization, pt to [facility] to [facility] at 7:45 am Right 6/2/2016    Performed by Beny GARCIA" [facility]    OOPHORECTOMY      one     Family History   Problem Relation Age of Onset    Heart disease Mother     Diabetes Mother     Diabetes Sister         2  with diabetes/amputation    Cancer Brother         lung     Social History     Tobacco Use    Smoking status: Former Smoker     Packs/day: 1.00     Types: Cigarettes     Last attempt to quit: 1994     Years since quittin.6    Smokeless tobacco: Never Used   Substance Use Topics    Alcohol use: No    Drug use: No     Review of Systems   Constitutional: Positive for chills. Negative for fever.   HENT: Positive for rhinorrhea and sneezing. Negative for sore throat.    Respiratory: Positive for cough. Negative for shortness of breath.    Cardiovascular: Negative for chest pain and leg swelling.   Gastrointestinal: Negative for abdominal pain, diarrhea and nausea.   Genitourinary: Negative for dysuria, frequency and hematuria.        No b/b incontinence.    Musculoskeletal: Positive for back pain.   Skin: Negative for rash.   Neurological: Negative for weakness, numbness (No saddle anesthesia or extremity paresthesias.) and headaches.   Hematological: Does not bruise/bleed easily.       Physical Exam     Initial Vitals [19 1337]   BP Pulse Resp Temp SpO2   (!) 101/54 95 18 98.6 °F (37 °C) 97 %      MAP       --         Physical Exam    Vitals reviewed.  Constitutional: She appears well-developed and well-nourished. She is not diaphoretic. No distress.   HENT:   Head: Normocephalic and atraumatic.   Nose: Nose normal.   Eyes: Conjunctivae and EOM are normal.   Neck: Normal range of motion.   Cardiovascular: Normal rate, regular rhythm and normal heart sounds. Exam reveals no friction rub.    No murmur heard.  Pulses:       Dorsalis pedis pulses are 2+ on the right side, and 2+ on the left side.   No peripheral edema or calf tenderness.   Pulmonary/Chest: Breath sounds normal. No respiratory distress. She has no  wheezes. She has no rales.   Abdominal: Soft. Bowel sounds are normal. She exhibits no distension. There is no tenderness. There is no rebound.   Musculoskeletal: Normal range of motion.   4/5 strength in right lower extremity mainly right hip flexion and extension.  Patient able to stand and bear weight, however she appears uncomfortable with ambulation.  Sensations grossly intact.   Neurological: She is alert and oriented to person, place, and time. She has normal strength. No sensory deficit.   Reflex Scores:       Patellar reflexes are 2+ on the right side and 2+ on the left side.       Achilles reflexes are 2+ on the right side and 2+ on the left side.  Skin: Skin is warm and dry. No erythema. No pallor.   Psychiatric: She has a normal mood and affect. Her behavior is normal. Judgment and thought content normal.         ED Course   Procedures  Labs Reviewed - No data to display       Imaging Results          X-Ray Chest PA And Lateral (Final result)  Result time 03/24/19 14:36:46    Final result by Markos Gomez MD (03/24/19 14:36:46)                 Impression:      No acute abnormality.      Electronically signed by: Markos Gomez MD  Date:    03/24/2019  Time:    14:36             Narrative:    EXAMINATION:  XR CHEST PA AND LATERAL    CLINICAL HISTORY:  Cough    TECHNIQUE:  PA and lateral views of the chest were performed.    COMPARISON:  None    FINDINGS:  The lungs are clear, with normal appearance of pulmonary vasculature and no pleural effusion or pneumothorax.    The cardiac silhouette is normal in size.  Atherosclerosis of the aortic arch.  The hilar and mediastinal contours are unremarkable.    Bones are intact.                               X-Ray Hip 2 View Right (Final result)  Result time 03/24/19 14:38:53    Final result by Markos Gomez MD (03/24/19 14:38:53)                 Impression:      No evidence of fracture.  Follow-up as clinically indicated.      Electronically signed  by: Markos Gomez MD  Date:    03/24/2019  Time:    14:38             Narrative:    EXAMINATION:  XR HIP 2 VIEW RIGHT    CLINICAL HISTORY:  Pain in right hip    TECHNIQUE:  AP view of the pelvis and frog leg lateral view of the right hip were performed.    COMPARISON:  None    FINDINGS:  Degenerative changes of the hips bilaterally with osteophyte formation.  No evidence of fracture or dislocation.  Extensive atherosclerosis.                                 Medical Decision Making:   History:   Old Medical Records: I decided to obtain old medical records.  Initial Assessment:   Patient is a 69 year feeling male that presents the ED with right hip pain and cough.  Differential Diagnosis:   Includes but is not limited to osteoarthritis, sciatica, viral URI, bronchitis, pneumonia, GERD.  Clinical Tests:   Radiological Study: Ordered and Reviewed  ED Management:  Will obtain x-rays, give Tylenol and apply Lidoderm patch, and continue to monitor.    Chest x-ray with no acute abnormality.  Right hip were x-ray with no evidence of fractures.  Degenerative changes of the hips bilaterally with osteophyte formation.    Patient reassessed and updated with results.  She reports great improvement in her symptoms and states that she forgot about the pain.  I will treat patient for osteoarthritis.  I advise regular acetaminophen use for her pain. She is to follow up with PCP if her symptoms do not improve.  All questions were answered.  Patient is comfortable with plan and stable for discharge.    I have reviewed patient's chart and discussed this case with my supervising MD.     Lucía Leo PA-C  Emergent Department  [facility]  Spectralink #[id number] or #[id number]                        Clinical Impression:       ICD-10-CM ICD-9-CM   1. Hip arthritis M16.10 716.95   2. Cough R05 786.2   3. Right hip pain M25.551 719.45         Disposition:   Disposition: Discharged  Condition: Stable                        Lucía  OSITO Leo  03/24/19 2042

## 2019-03-24 NOTE — ED TRIAGE NOTES
Pt to the ED with lower back pain times 4 days. Pain radiates from left lower back into buttock and right hip. Denies relief with OTC medications. Denies fall but reports lifting small grandchildren almost daily.     Patient identifiers verified and correct for Whit Sloan.     LOC: The patient is awake, alert and aware of environment with an appropriate affect, the patient is oriented x 3 and speaking appropriately.   APPEARANCE: Patient appears comfortable and in no acute distress, patient is clean and well groomed.  SKIN: The skin is warm and dry, color consistent with ethnicity, patient has normal skin turgor and moist mucus membranes, skin intact, no breakdown or bruising noted.   MUSCULOSKELETAL: Patient moving all extremities spontaneously, no swelling noted. Pain to right lower back radiating to buttock.  RESPIRATORY: Airway is open and patent, respirations are spontaneous, patient has a normal effort and rate, no accessory muscle use noted.  CARDIAC: Patient has a normal rate and regular rhythm, no edema noted, capillary refill < 3 seconds.   GASTRO: Soft and non tender to palpation, no distention noted.   : Pt denies any pain or frequency with urination.  NEURO: Pt opens eyes spontaneously, behavior appropriate to situation, follows commands, facial expression symmetrical, bilateral hand grasp equal and even, purposeful motor response noted, normal sensation in all extremities when touched with a finger.

## 2019-04-12 ENCOUNTER — LAB VISIT (OUTPATIENT)
Dept: LAB | Facility: HOSPITAL | Age: 79
End: 2019-04-12
Attending: FAMILY MEDICINE
Payer: MEDICARE

## 2019-04-12 DIAGNOSIS — E11.22 TYPE 2 DIABETES MELLITUS WITH STAGE 4 CHRONIC KIDNEY DISEASE, WITHOUT LONG-TERM CURRENT USE OF INSULIN: ICD-10-CM

## 2019-04-12 DIAGNOSIS — N18.4 TYPE 2 DIABETES MELLITUS WITH STAGE 4 CHRONIC KIDNEY DISEASE, WITHOUT LONG-TERM CURRENT USE OF INSULIN: ICD-10-CM

## 2019-04-12 LAB
ALBUMIN SERPL BCP-MCNC: 3.3 G/DL (ref 3.5–5.2)
ALP SERPL-CCNC: 48 U/L (ref 55–135)
ALT SERPL W/O P-5'-P-CCNC: 11 U/L (ref 10–44)
ANION GAP SERPL CALC-SCNC: 10 MMOL/L (ref 8–16)
AST SERPL-CCNC: 20 U/L (ref 10–40)
BILIRUB SERPL-MCNC: 0.7 MG/DL (ref 0.1–1)
BUN SERPL-MCNC: 31 MG/DL (ref 8–23)
CALCIUM SERPL-MCNC: 10.1 MG/DL (ref 8.7–10.5)
CHLORIDE SERPL-SCNC: 104 MMOL/L (ref 95–110)
CO2 SERPL-SCNC: 24 MMOL/L (ref 23–29)
CREAT SERPL-MCNC: 2.2 MG/DL (ref 0.5–1.4)
EST. GFR  (AFRICAN AMERICAN): 23.9 ML/MIN/1.73 M^2
EST. GFR  (NON AFRICAN AMERICAN): 20.7 ML/MIN/1.73 M^2
ESTIMATED AVG GLUCOSE: 180 MG/DL (ref 68–131)
GLUCOSE SERPL-MCNC: 130 MG/DL (ref 70–110)
HBA1C MFR BLD HPLC: 7.9 % (ref 4–5.6)
POTASSIUM SERPL-SCNC: 5 MMOL/L (ref 3.5–5.1)
PROT SERPL-MCNC: 7.2 G/DL (ref 6–8.4)
SODIUM SERPL-SCNC: 138 MMOL/L (ref 136–145)

## 2019-04-12 PROCEDURE — 80053 COMPREHEN METABOLIC PANEL: CPT

## 2019-04-12 PROCEDURE — 83036 HEMOGLOBIN GLYCOSYLATED A1C: CPT

## 2019-04-12 PROCEDURE — 36415 COLL VENOUS BLD VENIPUNCTURE: CPT | Mod: PO

## 2019-04-15 DIAGNOSIS — E78.5 DM TYPE 2 WITH DIABETIC DYSLIPIDEMIA: ICD-10-CM

## 2019-04-15 DIAGNOSIS — E11.69 DM TYPE 2 WITH DIABETIC DYSLIPIDEMIA: ICD-10-CM

## 2019-04-15 RX ORDER — GLIPIZIDE 10 MG/1
TABLET ORAL
Qty: 180 TABLET | Refills: 0 | Status: SHIPPED | OUTPATIENT
Start: 2019-04-15 | End: 2019-08-22 | Stop reason: SDUPTHER

## 2019-04-29 ENCOUNTER — TELEPHONE (OUTPATIENT)
Dept: FAMILY MEDICINE | Facility: CLINIC | Age: 79
End: 2019-04-29

## 2019-04-29 NOTE — TELEPHONE ENCOUNTER
Left message for patient to call office back to make a sooner appointment with Dr Meyers to discuss lab.

## 2019-04-30 ENCOUNTER — TELEPHONE (OUTPATIENT)
Dept: FAMILY MEDICINE | Facility: CLINIC | Age: 79
End: 2019-04-30

## 2019-04-30 NOTE — TELEPHONE ENCOUNTER
----- Message from Norma Melendez sent at 4/30/2019 12:11 PM CDT -----  Contact: [phone]/self  Pt is returning your call

## 2019-05-10 ENCOUNTER — OFFICE VISIT (OUTPATIENT)
Dept: FAMILY MEDICINE | Facility: CLINIC | Age: 79
End: 2019-05-10
Payer: MEDICARE

## 2019-05-10 VITALS
SYSTOLIC BLOOD PRESSURE: 122 MMHG | BODY MASS INDEX: 25.85 KG/M2 | HEIGHT: 64 IN | WEIGHT: 151.44 LBS | HEART RATE: 81 BPM | OXYGEN SATURATION: 97 % | DIASTOLIC BLOOD PRESSURE: 60 MMHG

## 2019-05-10 DIAGNOSIS — E11.69 DM TYPE 2 WITH DIABETIC DYSLIPIDEMIA: ICD-10-CM

## 2019-05-10 DIAGNOSIS — N18.4 TYPE 2 DIABETES MELLITUS WITH STAGE 4 CHRONIC KIDNEY DISEASE, WITHOUT LONG-TERM CURRENT USE OF INSULIN: ICD-10-CM

## 2019-05-10 DIAGNOSIS — E11.22 TYPE 2 DIABETES MELLITUS WITH STAGE 4 CHRONIC KIDNEY DISEASE, WITHOUT LONG-TERM CURRENT USE OF INSULIN: ICD-10-CM

## 2019-05-10 DIAGNOSIS — H90.6 MIXED CONDUCTIVE AND SENSORINEURAL HEARING LOSS OF BOTH EARS: ICD-10-CM

## 2019-05-10 DIAGNOSIS — E78.5 DM TYPE 2 WITH DIABETIC DYSLIPIDEMIA: ICD-10-CM

## 2019-05-10 DIAGNOSIS — I15.2 HYPERTENSION ASSOCIATED WITH DIABETES: ICD-10-CM

## 2019-05-10 DIAGNOSIS — E11.59 HYPERTENSION ASSOCIATED WITH DIABETES: ICD-10-CM

## 2019-05-10 DIAGNOSIS — Z79.810 USE OF TAMOXIFEN (NOLVADEX): ICD-10-CM

## 2019-05-10 DIAGNOSIS — I12.9 HYPERTENSIVE KIDNEY DISEASE WITH CHRONIC KIDNEY DISEASE STAGE IV: Primary | ICD-10-CM

## 2019-05-10 DIAGNOSIS — N18.4 HYPERTENSIVE KIDNEY DISEASE WITH CHRONIC KIDNEY DISEASE STAGE IV: Primary | ICD-10-CM

## 2019-05-10 PROCEDURE — 99999 PR PBB SHADOW E&M-EST. PATIENT-LVL IV: ICD-10-PCS | Mod: PBBFAC,,, | Performed by: FAMILY MEDICINE

## 2019-05-10 PROCEDURE — 99214 OFFICE O/P EST MOD 30 MIN: CPT | Mod: PBBFAC,PO | Performed by: FAMILY MEDICINE

## 2019-05-10 PROCEDURE — 99214 OFFICE O/P EST MOD 30 MIN: CPT | Mod: S$PBB,,, | Performed by: FAMILY MEDICINE

## 2019-05-10 PROCEDURE — 99214 PR OFFICE/OUTPT VISIT, EST, LEVL IV, 30-39 MIN: ICD-10-PCS | Mod: S$PBB,,, | Performed by: FAMILY MEDICINE

## 2019-05-10 PROCEDURE — 99999 PR PBB SHADOW E&M-EST. PATIENT-LVL IV: CPT | Mod: PBBFAC,,, | Performed by: FAMILY MEDICINE

## 2019-05-10 RX ORDER — LOSARTAN POTASSIUM 100 MG/1
100 TABLET ORAL DAILY
Qty: 90 TABLET | Refills: 1 | Status: SHIPPED | OUTPATIENT
Start: 2019-05-10 | End: 2020-02-06 | Stop reason: DRUGHIGH
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
## 2019-05-10 NOTE — PATIENT INSTRUCTIONS
Healthy Meals for Diabetes     A healthcare provider will help you develop a meal plan that fits your needs.   Ask your healthcare team to help you make a meal plan that fits your needs. Your meal plan tells you when to eat your meals and snacks, what kinds of foods to eat, and how much of each food to eat. You dont have to give up all the foods you like. But you do need to follow some guidelines.  Choose healthy carbohydrates  Starches, sugars, and fiber are all types of carbohydrates. Fiber can help lower your cholesterol and triglycerides. Fiber is also healthy for your heart. You should have 20 to 35 grams of total fiber each day. Fiber-rich foods include:  · Whole-grain breads and cereals  · Bulgur wheat  · Brown rice     · Whole-wheat pasta  · Fruits and vegetables  · Dry beans, and peas   Keep track of the amount of carbohydrates you eat. This can help you keep the right balance of physical activity and medicine. The amount of carbohydrates needed will vary for each person. It depends on many things such as your health, the medicines you take, and how active you are. Your healthcare team will help you figure out the right amount of carbohydrates for you. You may start with around 45 to 60 grams of carbohydrates per meal, depending on your situation.   Here are some examples of foods containing about 15 grams of carbohydrates (1 serving of carbohydrates):  · 1/2 cup of canned or frozen fruit  · A small piece of fresh fruit (4 ounces)  · 1 slice of bread  · 1/2 cup of oatmeal  · 1/3 cup of rice  · 4 to 6 crackers  · 1/2 English muffin  · 1/2 cup of black beans  · 1/4 of a large baked potato (3 ounces)  · 2/3 cup of plain fat-free yogurt  · 1 cup of soup  · 1/2 cup of casserole  · 6 chicken nuggets  · 2-inch-square brownie or cake without frosting  · 2 small cookies  · 1/2 cup of ice cream or sherbet  Choose healthy protein foods  Eating protein that is low in fat can help you control your weight. It also  helps keep your heart healthy. Low-fat protein foods include:  · Fish  · Plant proteins, such as dry beans and peas, nuts, and soy products like tofu and soymilk  · Lean meat with all visible fat removed  · Poultry with the skin removed  · Low-fat or nonfat milk, cheese, and yogurt  Limit unhealthy fats and sugar  Saturated and trans fats are unhealthy for your heart. They raise LDL (bad) cholesterol. Fat is also high in calories, so it can make you gain weight. To cut down on unhealthy fats and sugar, limit these foods:  · Butter or margarine  · Palm and palm kernel oils and coconut oil  · Cream  · Cheese  · [place]  · Lunch meats     · Ice cream  · Sweet bakery goods such as pies, muffins, and donuts  · Jams and jellies  · Candy bars  · Regular sodas   How much to eat  The amount of food you eat affects your blood sugar. It also affects your weight. Your healthcare team will tell you how much of each type of food you should eat.  · Use measuring cups and spoons and a food scale to measure serving sizes.  · Learn what a correct serving size looks like on your plate. This will help when you are away from home and cant measure your servings.  · Eat only the number of servings given on your meal plan for each food. Dont take seconds.  · Learn to read food labels. Be sure to look at serving size, total carbohydrates, fiber, calories, sugar, and saturated and trans fats. Look for healthier alternatives to foods that have added sugar.  · Plan ahead for parties so you can still have a good time without going overboard with unhealthy food choices. Set a good example yourself by bringing a healthy dish to pot lucks.   Choose healthy snacks  When it comes to snacks, we usually think about foods with added sugar and fats. But there are many other options for healthier snack choices. Here are a few snack ideas to choose from:  Snacks with less than 5 grams of carbohydrates  · 1 piece of string cheese  · 3 celery sticks plus 1  tablespoon of peanut butter  · 5 cherry tomatoes plus 1 tablespoon of ranch dressing  · 1 hard-boiled egg  · 1/4 cup of fresh blueberries  ·  5 baby carrots  · 1 cup of light popcorn  · 1/2 cup of sugar-free gelatin  · 15 almonds  Snacks with about 10 to 20 grams of carbohydrates  · 1/3 cup of hummus plus 1 cup of fresh cut nonstarchy vegetables (carrots, green peppers, broccoli, celery, or a combination)  · 1/2 cup of fresh or canned fruit plus 1/4 cup of cottage cheese  · 1/2 cup of tuna salad with 4 crackers  · 2 rice cakes and a tablespoon of peanut butter  · 1 small apple or orange  · 3 cups light popcorn  · 1/2 of a turkey sandwich (1 slice of whole-wheat bread, 2 ounces of turkey, and mustard)  Portion sizes are important to controlling your blood sugar and staying at a healthy weight. Stock up on healthy snack items so you always have them on hand.  When to eat  Your meal plan will likely include breakfast, lunch, dinner, and some snacks.  · Try to eat your meals and snacks at about the same times each day.  · Eat all your meals and snacks. Skipping a meal or snack can make your blood sugar drop too low. It can also cause you to eat too much at the next meal or snack. Then your blood sugar could get too high.  Date Last Reviewed: 7/1/2016  © 7778-7145 [employer]. [address] [place] [address], [place] [address]. All rights reserved. This information is not intended as a substitute for professional medical care. Always follow your healthcare professional's instructions.

## 2019-05-20 DIAGNOSIS — N18.30 TYPE 2 DIABETES MELLITUS WITH STAGE 3 CHRONIC KIDNEY DISEASE, WITHOUT LONG-TERM CURRENT USE OF INSULIN: ICD-10-CM

## 2019-05-20 DIAGNOSIS — E11.22 TYPE 2 DIABETES MELLITUS WITH STAGE 3 CHRONIC KIDNEY DISEASE, WITHOUT LONG-TERM CURRENT USE OF INSULIN: ICD-10-CM

## 2019-05-30 ENCOUNTER — CLINICAL SUPPORT (OUTPATIENT)
Dept: AUDIOLOGY | Facility: CLINIC | Age: 79
End: 2019-05-30
Payer: MEDICARE

## 2019-05-30 ENCOUNTER — OFFICE VISIT (OUTPATIENT)
Dept: OTOLARYNGOLOGY | Facility: CLINIC | Age: 79
End: 2019-05-30
Payer: MEDICARE

## 2019-05-30 DIAGNOSIS — H90.3 SENSORINEURAL HEARING LOSS, BILATERAL: Primary | ICD-10-CM

## 2019-05-30 DIAGNOSIS — H90.3 SENSORINEURAL HEARING LOSS (SNHL) OF BOTH EARS: Primary | ICD-10-CM

## 2019-05-30 PROCEDURE — 92557 COMPREHENSIVE HEARING TEST: CPT | Mod: PBBFAC | Performed by: PHYSICIAN ASSISTANT

## 2019-05-30 PROCEDURE — 99999 PR PBB SHADOW E&M-EST. PATIENT-LVL II: ICD-10-PCS | Mod: PBBFAC,,, | Performed by: NURSE PRACTITIONER

## 2019-05-30 PROCEDURE — 99999 PR PBB SHADOW E&M-EST. PATIENT-LVL I: CPT | Mod: PBBFAC,,, | Performed by: PHYSICIAN ASSISTANT

## 2019-05-30 PROCEDURE — 99211 OFF/OP EST MAY X REQ PHY/QHP: CPT | Mod: PBBFAC,27,25 | Performed by: PHYSICIAN ASSISTANT

## 2019-05-30 PROCEDURE — 99999 PR PBB SHADOW E&M-EST. PATIENT-LVL I: ICD-10-PCS | Mod: PBBFAC,,, | Performed by: PHYSICIAN ASSISTANT

## 2019-05-30 PROCEDURE — 99212 OFFICE O/P EST SF 10 MIN: CPT | Mod: PBBFAC | Performed by: NURSE PRACTITIONER

## 2019-05-30 PROCEDURE — 99999 PR PBB SHADOW E&M-EST. PATIENT-LVL II: CPT | Mod: PBBFAC,,, | Performed by: NURSE PRACTITIONER

## 2019-05-30 PROCEDURE — 99202 PR OFFICE/OUTPT VISIT, NEW, LEVL II, 15-29 MIN: ICD-10-PCS | Mod: S$PBB,,, | Performed by: NURSE PRACTITIONER

## 2019-05-30 PROCEDURE — 99202 OFFICE O/P NEW SF 15 MIN: CPT | Mod: S$PBB,,, | Performed by: NURSE PRACTITIONER

## 2019-05-30 NOTE — PROGRESS NOTES
Subjective:      Whit Sloan is a 78 y.o. female who was referred to me by Dr. Ingrid Meyers in consultation for hearing loss.    Patient reports having trouble hearing. She has a long standing history of hearing loss. She denies tinnitus, vision change, change in balance, facial weakness or numbness. Nothing makes the hearing better. Crowed noise makes hearing worse. There is not a family history of hearing loss at a young age.  There is not a prior history of ear surgery.  There is not a prior history of ear infections .  She denies a history of significant noise exposure.  She does not wear hearing aids currently.  She has not had a hearing test recently.     Past Medical History  She has a past medical history of Breast cancer, CKD (chronic kidney disease) stage 3, GFR 30-59 ml/min, Depressive disorder, not elsewhere classified, Disorder of bone and cartilage, unspecified, Hypertension, Other and unspecified hyperlipidemia, Recurrent nephrolithiasis, and Type II or unspecified type diabetes mellitus without mention of complication, uncontrolled.    Past Surgical History  She has a past surgical history that includes Hysterectomy; Oophorectomy; Breast biopsy (Right, 2016); and Breast lumpectomy (Right, 2016).    Family History  Her family history includes Cancer in her brother; Diabetes in her mother and sister; Heart disease in her mother.    Social History  She reports that she quit smoking about 24 years ago. Her smoking use included cigarettes. She smoked 1.00 pack per day. She has never used smokeless tobacco. She reports that she does not drink alcohol or use drugs.    Allergies  She has No Known Allergies.    Medications  She has a current medication list which includes the following prescription(s): calcitriol, fluoxetine, furosemide, glipizide, latanoprost, losartan, magnesium oxide, pioglitazone, potassium citrate, simvastatin, sitagliptin, tamoxifen, and timolol maleate 0.25%.    Review  of Systems   Constitutional: Negative for chills, fever and unexpected weight change.   HENT: Positive for hearing loss. Negative for congestion, ear discharge, ear pain, facial swelling, postnasal drip, sinus pressure, sore throat, tinnitus and trouble swallowing.    Eyes: Negative for pain and visual disturbance.   Respiratory: Negative for apnea and shortness of breath.    Cardiovascular: Negative for chest pain and palpitations.   Gastrointestinal: Negative for abdominal pain and nausea.   Endocrine: Negative for cold intolerance and heat intolerance.   Musculoskeletal: Negative for joint swelling and neck stiffness.   Skin: Negative for color change and rash.   Neurological: Negative for dizziness, facial asymmetry and headaches.   Hematological: Negative for adenopathy. Does not bruise/bleed easily.   Psychiatric/Behavioral: Negative for agitation. The patient is not nervous/anxious.           Objective:     LMP  (LMP Unknown)      Constitutional:   Vital signs are normal. She appears well-developed and well-nourished.     Head:  Normocephalic and atraumatic.     Ears:    Right Ear: No lacerations. No drainage, swelling or tenderness. No foreign bodies. No mastoid tenderness. Tympanic membrane is not injected, not scarred, not perforated, not erythematous, not retracted and not bulging. Tympanic membrane mobility is normal. No middle ear effusion. No hemotympanum. Decreased hearing is noted.   Left Ear: No lacerations. No drainage, swelling or tenderness. No foreign bodies. No mastoid tenderness. Tympanic membrane is not injected, not scarred, not perforated, not erythematous, not retracted and not bulging. Tympanic membrane mobility is normal.  No middle ear effusion. No hemotympanum. Decreased hearing is noted.     Nose:  Nose normal including turbinates, nasal mucosa, sinuses and nasal septum.     Neck:  Neck normal without thyromegaly masses, asymmetry, normal tracheal structure, crepitus, and tenderness  and no adenopathy.     Cardiovascular:   Normal heart sounds and normal pulses.      Pulmonary/Chest:   Effort normal and breath sounds normal.     Psychiatric:   She has a normal mood and affect.       Procedure    None      Data Reviewed    WBC (K/uL)   Date Value   10/31/2017 4.88     Platelets (K/uL)   Date Value   10/31/2017 135 (L)      Creatinine (mg/dL)   Date Value   04/12/2019 2.2 (H)     No results found for: TSH  Glucose (mg/dL)   Date Value   04/12/2019 130 (H)     Hemoglobin A1C (%)   Date Value   04/12/2019 7.9 (H)           I independently reviewed the tracings of the complete audiometric evaluation performed today.  I reviewed the audiogram with the patient as well.  Pertinent findings include bilateral SNHL in mid to high frequencies, no change from prior audiogram in 2013..         Assessment:     1. Sensorineural hearing loss (SNHL) of both ears         Plan:     I had a long discussion with the patient regarding her condition and the further workup and management options.    This patient has SNHL. I do recommended hearing aid evaluation.  I recommended annual audiograms.

## 2019-05-30 NOTE — LETTER
May 30, 2019      Ingrid Meyers MD  [address] [place]  [place] [address]           [facility] - Otorhinolaryngology  [address]  [facility] [address]  Phone: [phone]  Fax: [phone]          Patient: Whit Sloan   MR Number: [id number]   YOB: 1940   Date of Visit: 5/30/2019       Dear Dr. Ingrid Meyers:    Thank you for referring Whit Sloan to me for evaluation. Attached you will find relevant portions of my assessment and plan of care.    If you have questions, please do not hesitate to call me. I look forward to following Whit Sloan along with you.    Sincerely,    Donny Dowling, NP    Enclosure  CC:  No Recipients    If you would like to receive this communication electronically, please contact [email or web address] or [phone] to request more information on [employer] Link access.    For providers and/or their staff who would like to refer a patient to [facility], please contact us through our one-stop-shop provider referral line, [facility], at [phone].    If you feel you have received this communication in error or would no longer like to receive these types of communications, please e-mail [email or web address]

## 2019-05-31 NOTE — PROGRESS NOTES
Audiological Evaluation:    Test results indicated a mild to moderate SNHL AU with good speech discrimination scores.  Recommend:  1.  Otologic evaluation  2.  Annual hearing evaluations  3.  Noise protection  4.  HAC

## 2019-06-07 ENCOUNTER — TELEPHONE (OUTPATIENT)
Dept: AUDIOLOGY | Facility: CLINIC | Age: 79
End: 2019-06-07

## 2019-06-10 ENCOUNTER — TELEPHONE (OUTPATIENT)
Dept: OTOLARYNGOLOGY | Facility: CLINIC | Age: 79
End: 2019-06-10

## 2019-06-10 ENCOUNTER — CLINICAL SUPPORT (OUTPATIENT)
Dept: AUDIOLOGY | Facility: CLINIC | Age: 79
End: 2019-06-10
Payer: MEDICARE

## 2019-06-10 DIAGNOSIS — H90.3 SENSORINEURAL HEARING LOSS, BILATERAL: Primary | ICD-10-CM

## 2019-06-10 DIAGNOSIS — H91.90 HEARING LOSS, UNSPECIFIED HEARING LOSS TYPE, UNSPECIFIED LATERALITY: Primary | ICD-10-CM

## 2019-06-10 PROCEDURE — 99499 UNLISTED E&M SERVICE: CPT | Mod: S$PBB,,, | Performed by: AUDIOLOGIST

## 2019-06-10 PROCEDURE — 99499 NO LOS: ICD-10-PCS | Mod: S$PBB,,, | Performed by: AUDIOLOGIST

## 2019-06-10 NOTE — PROGRESS NOTES
Ms. Sloan was seen today for a hearing aid consult. Based on her lifestyle it was recommended she be fit with the followin Resound LinxQuattro - Pts daughter will call to verify level of technology   *Need Resound for the multiple charges when the  is not plugged into outlet   Beige  Size 2 MP Receivers  Medium Open Domes    Ms. Sloan and her daughter are going to contact care [employer] and [facility] to gather financing options and will call if she wishes to proceed with a hearing aid purchase.

## 2019-06-25 ENCOUNTER — LAB VISIT (OUTPATIENT)
Dept: LAB | Facility: HOSPITAL | Age: 79
End: 2019-06-25
Attending: FAMILY MEDICINE
Payer: MEDICARE

## 2019-06-25 ENCOUNTER — OFFICE VISIT (OUTPATIENT)
Dept: FAMILY MEDICINE | Facility: CLINIC | Age: 79
End: 2019-06-25
Payer: MEDICARE

## 2019-06-25 VITALS
HEIGHT: 64 IN | DIASTOLIC BLOOD PRESSURE: 60 MMHG | BODY MASS INDEX: 26.46 KG/M2 | WEIGHT: 155 LBS | OXYGEN SATURATION: 97 % | SYSTOLIC BLOOD PRESSURE: 112 MMHG | HEART RATE: 69 BPM

## 2019-06-25 DIAGNOSIS — E11.22 TYPE 2 DIABETES MELLITUS WITH STAGE 4 CHRONIC KIDNEY DISEASE, WITHOUT LONG-TERM CURRENT USE OF INSULIN: ICD-10-CM

## 2019-06-25 DIAGNOSIS — Z17.0 MALIGNANT NEOPLASM OF LOWER-INNER QUADRANT OF RIGHT BREAST OF FEMALE, ESTROGEN RECEPTOR POSITIVE: ICD-10-CM

## 2019-06-25 DIAGNOSIS — Z79.810 USE OF TAMOXIFEN (NOLVADEX): ICD-10-CM

## 2019-06-25 DIAGNOSIS — N18.4 TYPE 2 DIABETES MELLITUS WITH STAGE 4 CHRONIC KIDNEY DISEASE, WITHOUT LONG-TERM CURRENT USE OF INSULIN: ICD-10-CM

## 2019-06-25 DIAGNOSIS — E78.5 DM TYPE 2 WITH DIABETIC DYSLIPIDEMIA: ICD-10-CM

## 2019-06-25 DIAGNOSIS — N18.4 HYPERTENSIVE KIDNEY DISEASE WITH CHRONIC KIDNEY DISEASE STAGE IV: Primary | ICD-10-CM

## 2019-06-25 DIAGNOSIS — E11.69 DM TYPE 2 WITH DIABETIC DYSLIPIDEMIA: ICD-10-CM

## 2019-06-25 DIAGNOSIS — I12.9 HYPERTENSIVE KIDNEY DISEASE WITH CHRONIC KIDNEY DISEASE STAGE IV: Primary | ICD-10-CM

## 2019-06-25 DIAGNOSIS — C50.311 MALIGNANT NEOPLASM OF LOWER-INNER QUADRANT OF RIGHT BREAST OF FEMALE, ESTROGEN RECEPTOR POSITIVE: ICD-10-CM

## 2019-06-25 LAB
ALBUMIN SERPL BCP-MCNC: 3.5 G/DL (ref 3.5–5.2)
ALP SERPL-CCNC: 52 U/L (ref 55–135)
ALT SERPL W/O P-5'-P-CCNC: 9 U/L (ref 10–44)
ANION GAP SERPL CALC-SCNC: 10 MMOL/L (ref 8–16)
AST SERPL-CCNC: 19 U/L (ref 10–40)
BILIRUB SERPL-MCNC: 0.4 MG/DL (ref 0.1–1)
BUN SERPL-MCNC: 34 MG/DL (ref 8–23)
CALCIUM SERPL-MCNC: 10 MG/DL (ref 8.7–10.5)
CHLORIDE SERPL-SCNC: 103 MMOL/L (ref 95–110)
CO2 SERPL-SCNC: 25 MMOL/L (ref 23–29)
CREAT SERPL-MCNC: 2.3 MG/DL (ref 0.5–1.4)
EST. GFR  (AFRICAN AMERICAN): 22.6 ML/MIN/1.73 M^2
EST. GFR  (NON AFRICAN AMERICAN): 19.6 ML/MIN/1.73 M^2
GLUCOSE SERPL-MCNC: 224 MG/DL (ref 70–110)
POTASSIUM SERPL-SCNC: 5.1 MMOL/L (ref 3.5–5.1)
PROT SERPL-MCNC: 7.4 G/DL (ref 6–8.4)
SODIUM SERPL-SCNC: 138 MMOL/L (ref 136–145)

## 2019-06-25 PROCEDURE — 83036 HEMOGLOBIN GLYCOSYLATED A1C: CPT

## 2019-06-25 PROCEDURE — 99999 PR PBB SHADOW E&M-EST. PATIENT-LVL III: CPT | Mod: PBBFAC,,, | Performed by: FAMILY MEDICINE

## 2019-06-25 PROCEDURE — 99214 PR OFFICE/OUTPT VISIT, EST, LEVL IV, 30-39 MIN: ICD-10-PCS | Mod: S$PBB,,, | Performed by: FAMILY MEDICINE

## 2019-06-25 PROCEDURE — 99999 PR PBB SHADOW E&M-EST. PATIENT-LVL III: ICD-10-PCS | Mod: PBBFAC,,, | Performed by: FAMILY MEDICINE

## 2019-06-25 PROCEDURE — 99213 OFFICE O/P EST LOW 20 MIN: CPT | Mod: PBBFAC,PO | Performed by: FAMILY MEDICINE

## 2019-06-25 PROCEDURE — 36415 COLL VENOUS BLD VENIPUNCTURE: CPT | Mod: PO

## 2019-06-25 PROCEDURE — 80053 COMPREHEN METABOLIC PANEL: CPT

## 2019-06-25 PROCEDURE — 99214 OFFICE O/P EST MOD 30 MIN: CPT | Mod: S$PBB,,, | Performed by: FAMILY MEDICINE

## 2019-06-25 RX ORDER — CHOLECALCIFEROL (VITAMIN D3) 25 MCG
2000 TABLET ORAL
COMMUNITY

## 2019-06-25 NOTE — PROGRESS NOTES
Subjective:       Patient ID: Whit Sloan is a 78 y.o. female.    Chief Complaint: Follow-up (6 wks); Hypertension; Hyperlipidemia; Diabetes; and Chronic Kidney Disease    HPI   79 yo female with uncontrolled DM, Type 2, HTN, hyperlipidemia, CKD, Stage 4, major depression and breast CA on Tamoxifen presents for f/u of uncontrolled DM, Type 2. Last HgbA1C 7.9% on 4/12/19. Pt is following a diabetic diet. Is walking daily for 1 hour. Is checking her BGs 1-2 times per week. Fasting BG was 160 when last checked. Denies hypoglycemic episodes.  Pt to see Dr. Sprague tomorrow in follow-up of CKD, Stage 4.  Pt has been evaluated for hearing loss and bilateral hearing aids have been ordered.  Review of Systems   Constitutional: Negative for chills and fever.   Respiratory: Negative for shortness of breath.    Cardiovascular: Negative for chest pain, palpitations and leg swelling.   Gastrointestinal: Negative for anal bleeding and blood in stool.   Endocrine: Negative for polydipsia, polyphagia and polyuria.   Genitourinary: Negative for dysuria and hematuria.       Objective:      Physical Exam   Constitutional: She is oriented to person, place, and time. She appears well-developed and well-nourished. No distress.   HENT:   Head: Normocephalic and atraumatic.   Neck: Normal range of motion. Neck supple. No JVD present. No thyromegaly present.   Cardiovascular: Normal rate, regular rhythm, normal heart sounds and intact distal pulses. Exam reveals no gallop and no friction rub.   No murmur heard.  Pulses:       Dorsalis pedis pulses are 2+ on the right side, and 2+ on the left side.        Posterior tibial pulses are 2+ on the right side, and 2+ on the left side.   Pulmonary/Chest: Effort normal and breath sounds normal. She has no wheezes. She has no rales.   Abdominal: Soft. Bowel sounds are normal. She exhibits no mass. There is no tenderness.   Musculoskeletal:        Right foot: There is deformity. There is normal  range of motion.        Left foot: There is deformity. There is normal range of motion.   Feet:   Right Foot:   Protective Sensation: 10 sites tested. 10 sites sensed.   Skin Integrity: Negative for ulcer, blister, skin breakdown, erythema, warmth, callus or dry skin.   Left Foot:   Protective Sensation: 10 sites tested. 10 sites sensed.   Skin Integrity: Negative for ulcer, blister, skin breakdown, erythema, warmth, callus or dry skin.   Lymphadenopathy:     She has no cervical adenopathy.   Neurological: She is alert and oriented to person, place, and time.   Skin: Skin is warm and dry. She is not diaphoretic.   Vitals reviewed.      Assessment:       See plan  Plan:       Whit was seen today for follow-up, hypertension, hyperlipidemia, diabetes and chronic kidney disease.    Diagnoses and all orders for this visit:    Hypertensive kidney disease with chronic kidney disease stage IV: Stable  - Continue f/u with Dr. 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 The patient has been provided educational materials regarding the benefits of attaining and maintaining a normal weight. We will continue to address and follow this issue during follow up visits.    Will adjust medications based upon A1C results. Consider initiation of insulin.    F/U in 3 months.

## 2019-06-26 LAB
ESTIMATED AVG GLUCOSE: 163 MG/DL (ref 68–131)
HBA1C MFR BLD HPLC: 7.3 % (ref 4–5.6)

## 2019-08-19 ENCOUNTER — DOCUMENTATION ONLY (OUTPATIENT)
Dept: AUDIOLOGY | Facility: CLINIC | Age: 79
End: 2019-08-19

## 2019-08-19 ENCOUNTER — TELEPHONE (OUTPATIENT)
Dept: OTOLARYNGOLOGY | Facility: CLINIC | Age: 79
End: 2019-08-19

## 2019-08-19 NOTE — TELEPHONE ENCOUNTER
----- Message from Holly Sagastume sent at 8/19/2019 12:19 PM CDT -----  Contact: pt  Needs Advice    Reason for call: calling to schedule appt pt caregiver says she need paperwork signed and ask if she need to be seen first? Please call to advise. Thanks.        Communication Preference: [phone]

## 2019-08-19 NOTE — PROGRESS NOTES
"Spoke with patients daughter today regarding hearing aids. Pts daughter stated she need a claim form from her insurance signed because insurance said they would cover a portion of her hearing aids. I informed pts daughter that we do not file with insurance for hearing aids, she understood. Pts daughter said "Physician or supplier" needs to sign form. Informed her that Dr. Grace would be the one to sign the claim form. Pts daughter did not agree, she also stated she was going to stop by today to get the form signed. I informed her that Dr. Grace is not in the clinic today and if she would like to speak with him regarding the form I will notify Jose Cortes his assistant. Pts daughter stated she wants to speak with Dr. Grace directly. Again, I informed her she would either need to set up an appt or first speak with his assistant.   Pts daughter also asked if I could order the hearing aids today and be seen tomorrow for a pick-up. I  informed her that I could order the hearing aids today, however, I do not have an opening for a pick-up until after labor day weekend. Pts daughter was dissatisfied once I told her this and asked if another audiologist could fit her mother with the hearing aids. I informed her the other audiologists who fit hearing aids are also completely booked until after labor day weekend and that this is a very busy time with the holiday weekend and back to school, and other patients have also had to wait to be seen. I also informed pts daughter that we do not have our hearing aid patients be seen by other audiologists once they are established with an audiologist here especially for hearing aid pick-ups and orientations. Pts daughter then asked to be seen by Sharee aNm for the pick-up/orientation and I informed her that Sharee is our hearing  and does not see patients.   Pts daughter stated she would call back when she wants to order the hearing aids. She seemed very " dissatisfied that we cannot accommodate her requests today, but there is simply no way to have the physician sign her paper, and order the hearing aids and have her be seen in a matter of 24 hours. I informed her we would be happy to look over the insurance form and work with her mother but we need more time to accommodate her requests.

## 2019-08-19 NOTE — TELEPHONE ENCOUNTER
Patient needs to got MEDICAL RECORDS for signature she will need to make a appointment with URIAH SMALLWOOD for hearing aid consult

## 2019-08-22 DIAGNOSIS — E11.69 DM TYPE 2 WITH DIABETIC DYSLIPIDEMIA: ICD-10-CM

## 2019-08-22 DIAGNOSIS — E78.5 DM TYPE 2 WITH DIABETIC DYSLIPIDEMIA: ICD-10-CM

## 2019-08-22 RX ORDER — GLIPIZIDE 10 MG/1
TABLET ORAL
Qty: 180 TABLET | Refills: 1 | Status: SHIPPED | OUTPATIENT
Start: 2019-08-22 | End: 2020-05-21 | Stop reason: SDUPTHER

## 2019-09-06 ENCOUNTER — CLINICAL SUPPORT (OUTPATIENT)
Dept: AUDIOLOGY | Facility: CLINIC | Age: 79
End: 2019-09-06
Payer: MEDICARE

## 2019-09-06 DIAGNOSIS — H90.3 SENSORINEURAL HEARING LOSS, BILATERAL: Primary | ICD-10-CM

## 2019-09-06 PROCEDURE — 99499 UNLISTED E&M SERVICE: CPT | Mod: S$PBB,,, | Performed by: AUDIOLOGIST

## 2019-09-06 PROCEDURE — 99499 NO LOS: ICD-10-PCS | Mod: S$PBB,,, | Performed by: AUDIOLOGIST

## 2019-09-06 NOTE — PROGRESS NOTES
"Ms. Sloan was seen today for a hearing aid fitting. Ms. Sloan was fit with the following:     ReSound Quattro 5   Gloss Black   Lt SN [id number]   Rt SN [id number]   : 2LP   Dome: Medium Open   Warranty Exp 9/22/22    SN [id number]     Ms. Sloan was counseled on care, use and maintenance of the hearing aids. The hearing aids were not paired to her phone, no plan to do this in the future. Ms. Sloan will return in 2 weeks for a follow-up     Ms. Sloan requested a "Provider Statement of Services Rendered" form be filled out and signed with the diagnosis code, CPT code, cost of hearing aids and Tax ID number. No where on this form did it say it was a claim form. All information that was asked on this form was the same information that we include in our Hearing Aid Contract.      "

## 2019-09-27 ENCOUNTER — CLINICAL SUPPORT (OUTPATIENT)
Dept: AUDIOLOGY | Facility: CLINIC | Age: 79
End: 2019-09-27
Payer: MEDICARE

## 2019-09-27 DIAGNOSIS — H90.3 SENSORINEURAL HEARING LOSS, BILATERAL: Primary | ICD-10-CM

## 2019-09-27 PROCEDURE — 99499 NO LOS: ICD-10-PCS | Mod: S$PBB,,, | Performed by: AUDIOLOGIST

## 2019-09-27 PROCEDURE — 99499 UNLISTED E&M SERVICE: CPT | Mod: S$PBB,,, | Performed by: AUDIOLOGIST

## 2019-09-27 NOTE — PROGRESS NOTES
Ms. Sloan was seen today for a hearing aid follow-up. Mr. Sloan stated recently the hearing aids seemed like they weren't working. The domes were clogged with wax, and she was instructed on how and when to change the wax filters and domes. I also changed to her small open domes and insertion was much better with no feedback. Ms. Sloan will return in 2 weeks to make sure the domes are a good fit and to see if we need to add on sports locks to help with retention.

## 2019-10-01 ENCOUNTER — OFFICE VISIT (OUTPATIENT)
Dept: FAMILY MEDICINE | Facility: CLINIC | Age: 79
End: 2019-10-01
Payer: MEDICARE

## 2019-10-01 VITALS
SYSTOLIC BLOOD PRESSURE: 104 MMHG | DIASTOLIC BLOOD PRESSURE: 60 MMHG | HEIGHT: 64 IN | HEART RATE: 90 BPM | WEIGHT: 156.5 LBS | BODY MASS INDEX: 26.72 KG/M2 | OXYGEN SATURATION: 97 %

## 2019-10-01 DIAGNOSIS — N18.4 TYPE 2 DIABETES MELLITUS WITH STAGE 4 CHRONIC KIDNEY DISEASE, WITHOUT LONG-TERM CURRENT USE OF INSULIN: ICD-10-CM

## 2019-10-01 DIAGNOSIS — E11.69 DM TYPE 2 WITH DIABETIC DYSLIPIDEMIA: ICD-10-CM

## 2019-10-01 DIAGNOSIS — E78.5 DM TYPE 2 WITH DIABETIC DYSLIPIDEMIA: ICD-10-CM

## 2019-10-01 DIAGNOSIS — I12.9 HYPERTENSIVE KIDNEY DISEASE WITH CHRONIC KIDNEY DISEASE STAGE IV: Primary | ICD-10-CM

## 2019-10-01 DIAGNOSIS — N18.4 HYPERTENSIVE KIDNEY DISEASE WITH CHRONIC KIDNEY DISEASE STAGE IV: Primary | ICD-10-CM

## 2019-10-01 DIAGNOSIS — Z23 NEED FOR INFLUENZA VACCINATION: ICD-10-CM

## 2019-10-01 DIAGNOSIS — Z79.810 USE OF TAMOXIFEN (NOLVADEX): ICD-10-CM

## 2019-10-01 DIAGNOSIS — Z17.0 MALIGNANT NEOPLASM OF LOWER-INNER QUADRANT OF RIGHT BREAST OF FEMALE, ESTROGEN RECEPTOR POSITIVE: ICD-10-CM

## 2019-10-01 DIAGNOSIS — C50.311 MALIGNANT NEOPLASM OF LOWER-INNER QUADRANT OF RIGHT BREAST OF FEMALE, ESTROGEN RECEPTOR POSITIVE: ICD-10-CM

## 2019-10-01 DIAGNOSIS — E11.22 TYPE 2 DIABETES MELLITUS WITH STAGE 4 CHRONIC KIDNEY DISEASE, WITHOUT LONG-TERM CURRENT USE OF INSULIN: ICD-10-CM

## 2019-10-01 PROCEDURE — 99214 OFFICE O/P EST MOD 30 MIN: CPT | Mod: S$PBB,,, | Performed by: FAMILY MEDICINE

## 2019-10-01 PROCEDURE — 99214 PR OFFICE/OUTPT VISIT, EST, LEVL IV, 30-39 MIN: ICD-10-PCS | Mod: S$PBB,,, | Performed by: FAMILY MEDICINE

## 2019-10-01 PROCEDURE — 99999 PR PBB SHADOW E&M-EST. PATIENT-LVL III: ICD-10-PCS | Mod: PBBFAC,,, | Performed by: FAMILY MEDICINE

## 2019-10-01 PROCEDURE — 99999 PR PBB SHADOW E&M-EST. PATIENT-LVL III: CPT | Mod: PBBFAC,,, | Performed by: FAMILY MEDICINE

## 2019-10-01 PROCEDURE — 90662 IIV NO PRSV INCREASED AG IM: CPT | Mod: PBBFAC,PO

## 2019-10-01 PROCEDURE — 99213 OFFICE O/P EST LOW 20 MIN: CPT | Mod: PBBFAC,PO,25 | Performed by: FAMILY MEDICINE

## 2019-10-01 RX ORDER — ALLOPURINOL 100 MG/1
TABLET ORAL
Refills: 3 | COMMUNITY
Start: 2019-09-25 | End: 2022-11-21 | Stop reason: SDUPTHER

## 2019-10-01 NOTE — PROGRESS NOTES
Subjective:       Patient ID: Whit Sloan is a 79 y.o. female.    Chief Complaint: Hypertension (3 mos); Diabetes; Chronic Kidney Disease; and Hyperlipidemia  78 yo female with HTN, CKD, Stage 4, hyperlipidemia, DM, Type 2 and breast CA presents for f/u of her chronic conditions.  Hypertension   This is a chronic problem. The current episode started more than 1 year ago. The problem is unchanged. The problem is controlled. Pertinent negatives include no blurred vision, chest pain, headaches, orthopnea, palpitations, peripheral edema, PND or shortness of breath. The current treatment provides significant improvement. There are no compliance problems.  Identifiable causes of hypertension include chronic renal disease.   Diabetes   She presents for her follow-up diabetic visit. She has type 2 diabetes mellitus. There are no hypoglycemic associated symptoms. Pertinent negatives for hypoglycemia include no headaches. Pertinent negatives for diabetes include no blurred vision, no chest pain, no polydipsia, no polyphagia and no polyuria. There are no hypoglycemic complications. Diabetic complications include nephropathy. Current diabetic treatment includes oral agent (triple therapy). She is compliant with treatment all of the time. An ACE inhibitor/angiotensin II receptor blocker is being taken. Eye exam is current.   Hyperlipidemia   This is a chronic problem. The current episode started more than 1 year ago. The problem is controlled. Recent lipid tests were reviewed and are normal. Exacerbating diseases include chronic renal disease and diabetes. She has no history of hypothyroidism, liver disease, obesity or nephrotic syndrome. Pertinent negatives include no chest pain or shortness of breath.   To see Dr. Sprague for f/u of CKD, Stage 4.  Pt is accompanied to the visit by her daughter.  Results for orders placed or performed in visit on 06/25/19   Comprehensive metabolic panel   Result Value Ref Range    Sodium 138  136 - 145 mmol/L    Potassium 5.1 3.5 - 5.1 mmol/L    Chloride 103 95 - 110 mmol/L    CO2 25 23 - 29 mmol/L    Glucose 224 (H) 70 - 110 mg/dL    BUN, Bld 34 (H) 8 - 23 mg/dL    Creatinine 2.3 (H) 0.5 - 1.4 mg/dL    Calcium 10.0 8.7 - 10.5 mg/dL    Total Protein 7.4 6.0 - 8.4 g/dL    Albumin 3.5 3.5 - 5.2 g/dL    Total Bilirubin 0.4 0.1 - 1.0 mg/dL    Alkaline Phosphatase 52 (L) 55 - 135 U/L    AST 19 10 - 40 U/L    ALT 9 (L) 10 - 44 U/L    Anion Gap 10 8 - 16 mmol/L    eGFR if African American 22.6 (A) >60 mL/min/1.73 m^2    eGFR if non African American 19.6 (A) >60 mL/min/1.73 m^2   Hemoglobin A1c   Result Value Ref Range    Hemoglobin A1C 7.3 (H) 4.0 - 5.6 %    Estimated Avg Glucose 163 (H) 68 - 131 mg/dL     Review of Systems   Constitutional: Negative for chills and fever.   Eyes: Negative for blurred vision and visual disturbance.   Respiratory: Negative for shortness of breath.    Cardiovascular: Negative for chest pain, palpitations, orthopnea, leg swelling and PND.   Gastrointestinal: Negative for abdominal pain, anal bleeding, blood in stool, nausea and vomiting.   Endocrine: Negative for polydipsia, polyphagia and polyuria.   Genitourinary: Negative for dysuria and hematuria.   Neurological: Negative for headaches.       Objective:      Physical Exam   Constitutional: She appears well-developed and well-nourished. No distress.   HENT:   Head: Normocephalic and atraumatic.   Neck: Normal range of motion. Neck supple. No JVD present. No thyromegaly present.   Cardiovascular: Normal rate, regular rhythm and normal heart sounds.   Pulmonary/Chest: Effort normal and breath sounds normal. She has no wheezes. She has no rales.   Abdominal: Soft. Bowel sounds are normal. She exhibits no mass. There is no tenderness.   Lymphadenopathy:     She has no cervical adenopathy.   Skin: Skin is warm and dry. She is not diaphoretic.   Vitals reviewed.      Assessment:         See plan  Plan:       Hypertensive kidney  disease with chronic kidney disease stage IV: Stable    DM type 2 with diabetic dyslipidemia: Stable  -     Comprehensive metabolic panel; Future; Expected date: 10/01/2019  -     Hemoglobin A1c; Future; Expected date: 10/01/2019  -     Lipid panel; Future; Expected date: 10/01/2019    Type 2 diabetes mellitus with stage 4 chronic kidney disease, without long-term current use of insulin: Stable    BMI 26.0-26.9,adult    Use of tamoxifen (Nolvadex)  -     Ambulatory referral to Hematology / Oncology    Need for influenza vaccination  -     Influenza - High Dose (65+) (PF) (IM)    Malignant neoplasm of lower-inner quadrant of right breast of female, estrogen receptor positive  -     Ambulatory referral to Hematology / Oncology    F/U in 3 months.

## 2019-10-08 LAB
ALBUMIN SERPL-MCNC: 3.8 G/DL (ref 3.6–5.1)
ALBUMIN/GLOB SERPL: 1.1 (CALC) (ref 1–2.5)
ALP SERPL-CCNC: 41 U/L (ref 33–130)
ALT SERPL-CCNC: 10 U/L (ref 6–29)
AST SERPL-CCNC: 18 U/L (ref 10–35)
BILIRUB SERPL-MCNC: 0.6 MG/DL (ref 0.2–1.2)
BUN SERPL-MCNC: 33 MG/DL (ref 7–25)
BUN/CREAT SERPL: 16 (CALC) (ref 6–22)
CALCIUM SERPL-MCNC: 10.2 MG/DL (ref 8.6–10.4)
CHLORIDE SERPL-SCNC: 108 MMOL/L (ref 98–110)
CHOLEST SERPL-MCNC: 213 MG/DL
CHOLEST/HDLC SERPL: 2.6 (CALC)
CO2 SERPL-SCNC: 25 MMOL/L (ref 20–32)
CREAT SERPL-MCNC: 2.02 MG/DL (ref 0.6–0.88)
GFRSERPLBLD MDRD-ARVRAT: 23 ML/MIN/1.73M2
GLOBULIN SER CALC-MCNC: 3.4 G/DL (CALC) (ref 1.9–3.7)
GLUCOSE SERPL-MCNC: 123 MG/DL (ref 65–99)
HBA1C MFR BLD: 7.3 % OF TOTAL HGB
HDLC SERPL-MCNC: 81 MG/DL
LDLC SERPL CALC-MCNC: 111 MG/DL (CALC)
NONHDLC SERPL-MCNC: 132 MG/DL (CALC)
POTASSIUM SERPL-SCNC: 4.9 MMOL/L (ref 3.5–5.3)
PROT SERPL-MCNC: 7.2 G/DL (ref 6.1–8.1)
SODIUM SERPL-SCNC: 142 MMOL/L (ref 135–146)
TRIGL SERPL-MCNC: 107 MG/DL

## 2019-10-14 ENCOUNTER — OFFICE VISIT (OUTPATIENT)
Dept: HEMATOLOGY/ONCOLOGY | Facility: CLINIC | Age: 79
End: 2019-10-14
Payer: MEDICARE

## 2019-10-14 VITALS
DIASTOLIC BLOOD PRESSURE: 69 MMHG | HEART RATE: 66 BPM | TEMPERATURE: 98 F | RESPIRATION RATE: 16 BRPM | WEIGHT: 155.63 LBS | HEIGHT: 64 IN | OXYGEN SATURATION: 99 % | SYSTOLIC BLOOD PRESSURE: 140 MMHG | BODY MASS INDEX: 26.57 KG/M2

## 2019-10-14 DIAGNOSIS — M85.9 DISORDER OF BONE DENSITY AND STRUCTURE, UNSPECIFIED: ICD-10-CM

## 2019-10-14 DIAGNOSIS — N18.4 HYPERTENSIVE KIDNEY DISEASE WITH CHRONIC KIDNEY DISEASE STAGE IV: Primary | ICD-10-CM

## 2019-10-14 DIAGNOSIS — M85.80 OSTEOPENIA, UNSPECIFIED LOCATION: ICD-10-CM

## 2019-10-14 DIAGNOSIS — C50.919 MALIGNANT NEOPLASM OF BREAST IN FEMALE, ESTROGEN RECEPTOR POSITIVE, UNSPECIFIED LATERALITY, UNSPECIFIED SITE OF BREAST: ICD-10-CM

## 2019-10-14 DIAGNOSIS — Z12.31 ENCOUNTER FOR SCREENING MAMMOGRAM FOR MALIGNANT NEOPLASM OF BREAST: ICD-10-CM

## 2019-10-14 DIAGNOSIS — Z17.0 MALIGNANT NEOPLASM OF BREAST IN FEMALE, ESTROGEN RECEPTOR POSITIVE, UNSPECIFIED LATERALITY, UNSPECIFIED SITE OF BREAST: ICD-10-CM

## 2019-10-14 DIAGNOSIS — I12.9 HYPERTENSIVE KIDNEY DISEASE WITH CHRONIC KIDNEY DISEASE STAGE IV: Primary | ICD-10-CM

## 2019-10-14 DIAGNOSIS — F32.1 CURRENT MODERATE EPISODE OF MAJOR DEPRESSIVE DISORDER, UNSPECIFIED WHETHER RECURRENT: ICD-10-CM

## 2019-10-14 DIAGNOSIS — M89.9 DISORDER OF BONE, UNSPECIFIED: ICD-10-CM

## 2019-10-14 PROCEDURE — 99214 PR OFFICE/OUTPT VISIT, EST, LEVL IV, 30-39 MIN: ICD-10-PCS | Mod: S$PBB,,, | Performed by: INTERNAL MEDICINE

## 2019-10-14 PROCEDURE — 99214 OFFICE O/P EST MOD 30 MIN: CPT | Mod: S$PBB,,, | Performed by: INTERNAL MEDICINE

## 2019-10-14 PROCEDURE — 99999 PR PBB SHADOW E&M-EST. PATIENT-LVL V: CPT | Mod: PBBFAC,,, | Performed by: INTERNAL MEDICINE

## 2019-10-14 PROCEDURE — 99999 PR PBB SHADOW E&M-EST. PATIENT-LVL V: ICD-10-PCS | Mod: PBBFAC,,, | Performed by: INTERNAL MEDICINE

## 2019-10-14 PROCEDURE — 99215 OFFICE O/P EST HI 40 MIN: CPT | Mod: PBBFAC | Performed by: INTERNAL MEDICINE

## 2019-10-14 RX ORDER — TAMOXIFEN CITRATE 20 MG/1
20 TABLET ORAL DAILY
Qty: 90 TABLET | Refills: 4 | Status: SHIPPED | OUTPATIENT
Start: 2019-10-14 | End: 2021-01-04 | Stop reason: SDUPTHER
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
## 2019-10-14 NOTE — LETTER
October 14, 2019      Ingrid Meyers MD  [address]  [place] [address]           [facility]ology On[facility]  [facility]  [place] [address]  Phone: [phone]          Patient: Whit Sloan   MR Number: [id number]   YOB: 1940   Date of Visit: 10/14/2019       Dear Dr. Ingrid Meyers:    Thank you for referring Whit Sloan to me for evaluation. Attached you will find relevant portions of my assessment and plan of care.    If you have questions, please do not hesitate to call me. I look forward to following Whit Sloan along with you.    Sincerely,    Jennifer Salinas MD    Enclosure  CC:  No Recipients    If you would like to receive this communication electronically, please contact [email or web address] or [phone] to request more information on [employer] access.    For providers and/or their staff who would like to refer a patient to [facility], please contact us through our one-stop-shop provider referral line, [facility] , at [phone].    If you feel you have received this communication in error or would no longer like to receive these types of communications, please e-mail [email or web address]

## 2019-11-01 ENCOUNTER — TELEPHONE (OUTPATIENT)
Dept: INFUSION THERAPY | Facility: HOSPITAL | Age: 79
End: 2019-11-01

## 2019-11-06 ENCOUNTER — TELEPHONE (OUTPATIENT)
Dept: FAMILY MEDICINE | Facility: CLINIC | Age: 79
End: 2019-11-06

## 2019-11-06 DIAGNOSIS — Z23 NEED FOR INFLUENZA VACCINATION: Primary | ICD-10-CM

## 2019-11-06 NOTE — TELEPHONE ENCOUNTER
Discussed with patient that during a routine audit we discovered a variation in the temperature of our medication refrigerator which may have affected the potency and effectiveness of our vaccines. The vaccine was not harmful but may have been ineffective so we recommend revaccination. Appointment set up for revaccination of high dose flu on 11/7/19 at 3PM. She verbalized understanding and all questions were answered.

## 2019-11-07 NOTE — TELEPHONE ENCOUNTER
Canceled patient's flu shot for today.  Patient is unable to make it in.  Patient would rather complete repeat flu shot on Monday.

## 2019-11-11 ENCOUNTER — CLINICAL SUPPORT (OUTPATIENT)
Dept: FAMILY MEDICINE | Facility: CLINIC | Age: 79
End: 2019-11-11
Payer: MEDICARE

## 2019-11-11 PROCEDURE — 90662 IIV NO PRSV INCREASED AG IM: CPT | Mod: PBBFAC,PO

## 2019-11-15 ENCOUNTER — TELEPHONE (OUTPATIENT)
Dept: INFUSION THERAPY | Facility: HOSPITAL | Age: 79
End: 2019-11-15

## 2019-11-18 ENCOUNTER — TELEPHONE (OUTPATIENT)
Dept: INFUSION THERAPY | Facility: HOSPITAL | Age: 79
End: 2019-11-18

## 2019-12-12 ENCOUNTER — TELEPHONE (OUTPATIENT)
Dept: INFUSION THERAPY | Facility: HOSPITAL | Age: 79
End: 2019-12-12

## 2019-12-16 ENCOUNTER — TELEPHONE (OUTPATIENT)
Dept: INFUSION THERAPY | Facility: HOSPITAL | Age: 79
End: 2019-12-16

## 2019-12-16 ENCOUNTER — HOSPITAL ENCOUNTER (OUTPATIENT)
Dept: RADIOLOGY | Facility: HOSPITAL | Age: 79
Discharge: HOME | End: 2019-12-16
Attending: INTERNAL MEDICINE
Payer: MEDICARE

## 2019-12-16 DIAGNOSIS — Z12.31 ENCOUNTER FOR SCREENING MAMMOGRAM FOR MALIGNANT NEOPLASM OF BREAST: ICD-10-CM

## 2019-12-16 DIAGNOSIS — C50.919 MALIGNANT NEOPLASM OF BREAST IN FEMALE, ESTROGEN RECEPTOR POSITIVE, UNSPECIFIED LATERALITY, UNSPECIFIED SITE OF BREAST: ICD-10-CM

## 2019-12-16 DIAGNOSIS — Z17.0 MALIGNANT NEOPLASM OF BREAST IN FEMALE, ESTROGEN RECEPTOR POSITIVE, UNSPECIFIED LATERALITY, UNSPECIFIED SITE OF BREAST: ICD-10-CM

## 2019-12-16 PROCEDURE — 77067 SCR MAMMO BI INCL CAD: CPT | Mod: TC

## 2019-12-16 PROCEDURE — 77067 MAMMO DIGITAL SCREENING BILAT WITH TOMOSYNTHESIS_CAD: ICD-10-PCS | Mod: 26,,, | Performed by: RADIOLOGY

## 2019-12-16 PROCEDURE — 77067 SCR MAMMO BI INCL CAD: CPT | Mod: 26,,, | Performed by: RADIOLOGY

## 2019-12-16 PROCEDURE — 77063 BREAST TOMOSYNTHESIS BI: CPT | Mod: 26,,, | Performed by: RADIOLOGY

## 2019-12-16 PROCEDURE — 77063 MAMMO DIGITAL SCREENING BILAT WITH TOMOSYNTHESIS_CAD: ICD-10-PCS | Mod: 26,,, | Performed by: RADIOLOGY

## 2020-01-04 ENCOUNTER — DOCUMENTATION ONLY (OUTPATIENT)
Dept: FAMILY MEDICINE | Facility: CLINIC | Age: 80
End: 2020-01-04

## 2020-02-06 ENCOUNTER — OFFICE VISIT (OUTPATIENT)
Dept: FAMILY MEDICINE | Facility: CLINIC | Age: 80
End: 2020-02-06
Payer: MEDICARE

## 2020-02-06 ENCOUNTER — LAB VISIT (OUTPATIENT)
Dept: LAB | Facility: HOSPITAL | Age: 80
End: 2020-02-06
Attending: FAMILY MEDICINE
Payer: MEDICARE

## 2020-02-06 VITALS
BODY MASS INDEX: 26.84 KG/M2 | HEART RATE: 74 BPM | SYSTOLIC BLOOD PRESSURE: 124 MMHG | WEIGHT: 157.19 LBS | HEIGHT: 64 IN | OXYGEN SATURATION: 95 % | DIASTOLIC BLOOD PRESSURE: 60 MMHG

## 2020-02-06 DIAGNOSIS — E78.5 DM TYPE 2 WITH DIABETIC DYSLIPIDEMIA: ICD-10-CM

## 2020-02-06 DIAGNOSIS — N18.4 TYPE 2 DIABETES MELLITUS WITH STAGE 4 CHRONIC KIDNEY DISEASE, WITHOUT LONG-TERM CURRENT USE OF INSULIN: ICD-10-CM

## 2020-02-06 DIAGNOSIS — N18.4 HYPERTENSIVE KIDNEY DISEASE WITH CHRONIC KIDNEY DISEASE STAGE IV: Primary | ICD-10-CM

## 2020-02-06 DIAGNOSIS — C50.311 MALIGNANT NEOPLASM OF LOWER-INNER QUADRANT OF RIGHT BREAST OF FEMALE, ESTROGEN RECEPTOR POSITIVE: ICD-10-CM

## 2020-02-06 DIAGNOSIS — E11.69 DM TYPE 2 WITH DIABETIC DYSLIPIDEMIA: ICD-10-CM

## 2020-02-06 DIAGNOSIS — Z79.810 USE OF TAMOXIFEN (NOLVADEX): ICD-10-CM

## 2020-02-06 DIAGNOSIS — E11.22 TYPE 2 DIABETES MELLITUS WITH STAGE 4 CHRONIC KIDNEY DISEASE, WITHOUT LONG-TERM CURRENT USE OF INSULIN: ICD-10-CM

## 2020-02-06 DIAGNOSIS — I12.9 HYPERTENSIVE KIDNEY DISEASE WITH CHRONIC KIDNEY DISEASE STAGE IV: Primary | ICD-10-CM

## 2020-02-06 DIAGNOSIS — F32.1 CURRENT MODERATE EPISODE OF MAJOR DEPRESSIVE DISORDER, UNSPECIFIED WHETHER RECURRENT: ICD-10-CM

## 2020-02-06 DIAGNOSIS — Z17.0 MALIGNANT NEOPLASM OF LOWER-INNER QUADRANT OF RIGHT BREAST OF FEMALE, ESTROGEN RECEPTOR POSITIVE: ICD-10-CM

## 2020-02-06 LAB
ALBUMIN SERPL BCP-MCNC: 3.4 G/DL (ref 3.5–5.2)
ALP SERPL-CCNC: 57 U/L (ref 55–135)
ALT SERPL W/O P-5'-P-CCNC: 7 U/L (ref 10–44)
ANION GAP SERPL CALC-SCNC: 7 MMOL/L (ref 8–16)
AST SERPL-CCNC: 17 U/L (ref 10–40)
BILIRUB SERPL-MCNC: 0.4 MG/DL (ref 0.1–1)
BUN SERPL-MCNC: 30 MG/DL (ref 8–23)
CALCIUM SERPL-MCNC: 9.9 MG/DL (ref 8.7–10.5)
CHLORIDE SERPL-SCNC: 106 MMOL/L (ref 95–110)
CO2 SERPL-SCNC: 26 MMOL/L (ref 23–29)
CREAT SERPL-MCNC: 2.1 MG/DL (ref 0.5–1.4)
EST. GFR  (AFRICAN AMERICAN): 25.1 ML/MIN/1.73 M^2
EST. GFR  (NON AFRICAN AMERICAN): 21.8 ML/MIN/1.73 M^2
GLUCOSE SERPL-MCNC: 198 MG/DL (ref 70–110)
POTASSIUM SERPL-SCNC: 4.1 MMOL/L (ref 3.5–5.1)
PROT SERPL-MCNC: 7.4 G/DL (ref 6–8.4)
SODIUM SERPL-SCNC: 139 MMOL/L (ref 136–145)

## 2020-02-06 PROCEDURE — 99999 PR PBB SHADOW E&M-EST. PATIENT-LVL IV: ICD-10-PCS | Mod: PBBFAC,,, | Performed by: FAMILY MEDICINE

## 2020-02-06 PROCEDURE — 83036 HEMOGLOBIN GLYCOSYLATED A1C: CPT

## 2020-02-06 PROCEDURE — 99214 PR OFFICE/OUTPT VISIT, EST, LEVL IV, 30-39 MIN: ICD-10-PCS | Mod: S$PBB,,, | Performed by: FAMILY MEDICINE

## 2020-02-06 PROCEDURE — 80053 COMPREHEN METABOLIC PANEL: CPT

## 2020-02-06 PROCEDURE — 36415 COLL VENOUS BLD VENIPUNCTURE: CPT | Mod: PO

## 2020-02-06 PROCEDURE — 99999 PR PBB SHADOW E&M-EST. PATIENT-LVL IV: CPT | Mod: PBBFAC,,, | Performed by: FAMILY MEDICINE

## 2020-02-06 PROCEDURE — 99214 OFFICE O/P EST MOD 30 MIN: CPT | Mod: S$PBB,,, | Performed by: FAMILY MEDICINE

## 2020-02-06 PROCEDURE — 99214 OFFICE O/P EST MOD 30 MIN: CPT | Mod: PBBFAC,PO | Performed by: FAMILY MEDICINE

## 2020-02-06 RX ORDER — FLUOXETINE 10 MG/1
10 CAPSULE ORAL DAILY
Qty: 90 CAPSULE | Refills: 0 | Status: SHIPPED | OUTPATIENT
Start: 2020-02-06 | End: 2020-02-06

## 2020-02-06 RX ORDER — SIMVASTATIN 40 MG/1
40 TABLET, FILM COATED ORAL NIGHTLY
Qty: 90 TABLET | Refills: 0 | Status: SHIPPED | OUTPATIENT
Start: 2020-02-06 | End: 2020-05-11 | Stop reason: SDUPTHER

## 2020-02-06 RX ORDER — MECLIZINE HYDROCHLORIDE 25 MG/1
TABLET ORAL
COMMUNITY
Start: 2020-01-04 | End: 2020-02-06 | Stop reason: ALTCHOICE

## 2020-02-06 RX ORDER — LOSARTAN POTASSIUM 25 MG/1
25 TABLET ORAL DAILY
Qty: 90 TABLET | Refills: 3
Start: 2020-02-06 | End: 2020-05-21 | Stop reason: SDUPTHER

## 2020-02-06 NOTE — PROGRESS NOTES
Subjective:       Patient ID: Whit Sloan is a 79 y.o. female.    Chief Complaint: Diabetes (4 mos); Hypertension; Dizziness (inner ear/ paroxysmal ED 01/04/20 @ [facility]); and Hyperlipidemia    Diabetes   She presents for her follow-up diabetic visit. She has type 2 diabetes mellitus. There are no hypoglycemic associated symptoms. Pertinent negatives for hypoglycemia include no headaches. Pertinent negatives for diabetes include no blurred vision, no chest pain, no fatigue, no foot paresthesias, no foot ulcerations, no polydipsia, no polyphagia, no polyuria, no visual change, no weakness and no weight loss. There are no hypoglycemic complications. Diabetic complications include nephropathy. Current diabetic treatment includes oral agent (dual therapy). She is compliant with treatment all of the time. She is following a diabetic diet. She rarely participates in exercise. An ACE inhibitor/angiotensin II receptor blocker is being taken. Eye exam is current.   Hypertension   This is a chronic problem. The current episode started more than 1 year ago. The problem is unchanged. The problem is controlled. Pertinent negatives include no blurred vision, chest pain, headaches, orthopnea, palpitations, peripheral edema, PND or shortness of breath. The current treatment provides significant improvement. There are no compliance problems.  Hypertensive end-organ damage includes kidney disease. Identifiable causes of hypertension include chronic renal disease.   Hyperlipidemia   This is a chronic problem. The current episode started more than 1 year ago. The problem is uncontrolled. Recent lipid tests were reviewed and are high. Exacerbating diseases include chronic renal disease and diabetes. She has no history of hypothyroidism, liver disease, obesity or nephrotic syndrome. Pertinent negatives include no chest pain, focal sensory loss, focal weakness, leg pain, myalgias or shortness of breath.   Dizziness: no fever, no  headaches, no nausea, no vomiting, no weakness, no palpitations and no chest pain.  Seen at [facility] ER for dizziness on 1/4/20. Diagnosed with vertigo. Treated with Meclizine. Has intermittent dizziness with changes in position of her head.  Is followed by Dr. Angie Sprague for CKD, Stage 4. Last seen 2/4/20 and will f/u in 3 months.  Pt needs a refill on Simvastatin.   No SI or HI. Pt has undergone many stressful situations over the past year. Pt to start therapy April 2020. Will not be able to restart Fluoxetine due to interaction with Tamoxifen. Pt defers restarting a medication for depression at this time.  Pt is accompanied to the visit by her daughter.  Pt has left breast CA and is on Tamoxifen. Followed by Hem/Onc.  Review of Systems   Constitutional: Negative for chills, fatigue, fever and weight loss.   Eyes: Negative for blurred vision.   Respiratory: Negative for shortness of breath.    Cardiovascular: Negative for chest pain, palpitations, orthopnea, leg swelling and PND.   Gastrointestinal: Negative for abdominal pain, anal bleeding, blood in stool, nausea and vomiting.   Endocrine: Negative for polydipsia, polyphagia and polyuria.   Genitourinary: Negative for dysuria and hematuria.   Musculoskeletal: Negative for myalgias.   Neurological: Negative for focal weakness, weakness and headaches.       Objective:      Physical Exam   Constitutional: She is oriented to person, place, and time. She appears well-developed and well-nourished. No distress.   HENT:   Head: Normocephalic and atraumatic.   Neck: Normal range of motion. Neck supple. No JVD present. No thyromegaly present.   Cardiovascular: Normal rate, regular rhythm and normal heart sounds. Exam reveals no gallop and no friction rub.   No murmur heard.  Pulmonary/Chest: Effort normal and breath sounds normal. She has no wheezes. She has no rales.   Abdominal: Soft. Bowel sounds are normal. She exhibits no mass. There is no tenderness. There is no rebound.    Lymphadenopathy:     She has no cervical adenopathy.   Neurological: She is alert and oriented to person, place, and time.   Skin: She is not diaphoretic.   Psychiatric: She has a normal mood and affect.   Vitals reviewed.      Assessment:         See plan  Plan:       Hypertensive kidney disease with chronic kidney disease stage IV: Stable  -     Ambulatory referral/consult to Outpatient Case Management; Future; Expected date: 02/13/2020  -     losartan (COZAAR) 25 MG tablet; Take 1 tablet (25 mg total) by mouth once daily.  Dispense: 90 tablet; Refill: 3  -    Continue f/u with Dr. Angie Sprague    DM type 2 with diabetic dyslipidemia: Stable  -     Comprehensive metabolic panel; Future; Expected date: 02/06/2020  -     Hemoglobin A1c; Future; Expected date: 02/06/2020  -     simvastatin (ZOCOR) 40 MG tablet; Take 1 tablet (40 mg total) by mouth every evening.  Dispense: 90 tablet; Refill: 0  -     Ambulatory referral/consult to Outpatient Case Management; Future; Expected date: 02/13/2020    Type 2 diabetes mellitus with stage 4 chronic kidney disease, without long-term current use of insulin: Stable  -     Ambulatory referral/consult to Outpatient Case Management; Future; Expected date: 02/13/2020    Malignant neoplasm of lower-inner quadrant of right breast of female, estrogen receptor positive: Stable  -     Ambulatory referral/consult to Outpatient Case Management; Future; Expected date: 02/13/2020    Use of tamoxifen (Nolvadex)  -     Ambulatory referral/consult to Outpatient Case Management; Future; Expected date: 02/13/2020    BMI 26.0-26.9,adult  -     Ambulatory referral/consult to Outpatient Case Management; Future; Expected date: 02/13/2020    Current moderate episode of major depressive disorder, unspecified whether recurrent  - Counseling appointment  -     Ambulatory referral/consult to Outpatient Case Management; Future; Expected date: 02/13/2020    F/U in 3 months.

## 2020-02-07 LAB
ESTIMATED AVG GLUCOSE: 180 MG/DL (ref 68–131)
HBA1C MFR BLD HPLC: 7.9 % (ref 4–5.6)

## 2020-02-20 ENCOUNTER — TELEPHONE (OUTPATIENT)
Dept: FAMILY MEDICINE | Facility: CLINIC | Age: 80
End: 2020-02-20

## 2020-02-20 NOTE — TELEPHONE ENCOUNTER
----- Message from Felicia Andres sent at 2/20/2020  1:38 PM CST -----  Contact: Sarah Daughter [phone]  Patient daughter is calling to talk to nurse in regards to her mother and is requesting a letter for jury duty to be excused since she is her mothers personal care taker patient would like to talk to

## 2020-02-20 NOTE — TELEPHONE ENCOUNTER
----- Message from Zak Oropeza sent at 2/20/2020 11:00 AM CST -----  Contact: [phone] / self   Patient would like a call back from your office regarding a note for her daughter's Jury duty. Please Advise.

## 2020-02-20 NOTE — LETTER
February 20, 2020    Whit Sloan  [address] [address]             [facility]  [address] [address]  Phone: [phone]  Fax: [phone] To whom it may concern,    Whit Sloan is an 79 year old patient of Dr Meyers who is cared for by her daughter Aracely Gomze. She is her full time caretaker.    Thanks,     Dr. Ingrid Meyers MD

## 2020-02-20 NOTE — TELEPHONE ENCOUNTER
Spoke with patient and instructed daughter will have to see her own PCP to get excuse for jury duty. Patient voices understanding.

## 2020-02-21 ENCOUNTER — CLINICAL SUPPORT (OUTPATIENT)
Dept: AUDIOLOGY | Facility: CLINIC | Age: 80
End: 2020-02-21
Payer: MEDICARE

## 2020-02-21 DIAGNOSIS — H90.3 SENSORINEURAL HEARING LOSS, BILATERAL: Primary | ICD-10-CM

## 2020-02-21 PROCEDURE — 99499 UNLISTED E&M SERVICE: CPT | Mod: S$PBB,,, | Performed by: AUDIOLOGIST

## 2020-02-21 PROCEDURE — 99499 NO LOS: ICD-10-PCS | Mod: S$PBB,,, | Performed by: AUDIOLOGIST

## 2020-02-21 NOTE — PROGRESS NOTES
Whit Sloan was seen today for a hearing aid follow-up. She complained of no volume. Upon listening check it was determined that the receivers had a short. Both receivers were replaced. She was also given more small open domes. Ms. Sloan stated she changes the domes often, I informed her she shouldn't be changing them but every couple months and if she was concerned about wax on the dome to use the pick to pick off the wax. She should only be removing the domes if the wax filters are completely clogged and she is not getting any volume. RTC PRN.

## 2020-03-04 ENCOUNTER — OUTPATIENT CASE MANAGEMENT (OUTPATIENT)
Dept: ADMINISTRATIVE | Facility: OTHER | Age: 80
End: 2020-03-04

## 2020-04-20 ENCOUNTER — DOCUMENTATION ONLY (OUTPATIENT)
Dept: HEMATOLOGY/ONCOLOGY | Facility: CLINIC | Age: 80
End: 2020-04-20

## 2020-04-21 ENCOUNTER — OFFICE VISIT (OUTPATIENT)
Dept: HEMATOLOGY/ONCOLOGY | Facility: CLINIC | Age: 80
End: 2020-04-21
Payer: MEDICARE

## 2020-04-21 DIAGNOSIS — C50.311 MALIGNANT NEOPLASM OF LOWER-INNER QUADRANT OF RIGHT BREAST OF FEMALE, ESTROGEN RECEPTOR POSITIVE: Primary | ICD-10-CM

## 2020-04-21 DIAGNOSIS — Z17.0 MALIGNANT NEOPLASM OF LOWER-INNER QUADRANT OF RIGHT BREAST OF FEMALE, ESTROGEN RECEPTOR POSITIVE: Primary | ICD-10-CM

## 2020-04-21 PROCEDURE — 99442 PR PHYSICIAN TELEPHONE EVALUATION 11-20 MIN: CPT | Mod: 95,,, | Performed by: INTERNAL MEDICINE

## 2020-04-21 PROCEDURE — 99442 PR PHYSICIAN TELEPHONE EVALUATION 11-20 MIN: ICD-10-PCS | Mod: 95,,, | Performed by: INTERNAL MEDICINE

## 2020-04-21 NOTE — PROGRESS NOTES
Subjective:       Patient ID: Whit Sloan is a 79 y.o. female.    Chief Complaint: No chief complaint on file.    HPI     The patient location is: home  The chief complaint leading to consultation is: follow up breast cancer  Visit type: audio only  Total time spent with patient: 15 minutes  Spoke to her daughter as well  Each patient to whom he or she provides medical services by telemedicine is:  (1) informed of the relationship between the physician and patient and the respective role of any other health care provider with respect to management of the patient; and (2) notified that he or she may decline to receive medical services by telemedicine and may withdraw from such care at any time.    Notes: see below    This is a 80 yo female who presents for follow-up on Tamoxifen.  Out of all her scripts x 1 week, had to switch pharmacies to [facility] from [facility]     Oncology History:  - 4/6/15 underwent routine mammogram and a focal asymmetric density in the right breast requiring additional evaluation seen.  Breast MRI was initially recommended and read as BI-RADS Category 0: Incomplete needing additional imaging   This was addended on 04/09/2015 and upon further review, focal asymmetric density in the right breast was read ads probably  benign. Follow-up in 6 months is recommended.  - 10/22/15 she underwent follow-up diagnostic mammogram which revealed stable focal asymmetry in the right breast is benign-negative.  Follow-up in 6  months is recommended.    ACR BI-RADS Category 2: Benign  - On 5/9/16 she underwent breast ultrasound  Stable focal asymmetry in the right breast is suspicious.  Histology using core biopsy was recommended.  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   HENT: Negative for congestion, rhinorrhea, sore throat and trouble swallowing.    Eyes: Negative for visual disturbance.   Respiratory: Negative for cough, chest tightness and shortness of breath.    Cardiovascular: Negative for chest pain, palpitations and leg swelling.   Gastrointestinal: Negative for abdominal pain, blood in stool, constipation, diarrhea, nausea and vomiting.   Genitourinary: Negative for dysuria, hematuria and vaginal bleeding.   Musculoskeletal: Negative for arthralgias, back pain and myalgias.   Skin: Negative for pallor and rash.   Neurological: Negative for dizziness, weakness and headaches.   Hematological: Negative for adenopathy. Does not bruise/bleed easily.   Psychiatric/Behavioral: Negative for dysphoric mood and suicidal ideas. The patient is not nervous/anxious.        Objective:      Physical Exam    Assessment:       1. Malignant neoplasm of lower-inner quadrant of right breast of female, estrogen receptor positive        Plan:     1. Continue Tamoxifen  RTC 6 months  Next mammogram in 12/2020  Knows to call for any issues

## 2020-04-28 ENCOUNTER — TELEPHONE (OUTPATIENT)
Dept: FAMILY MEDICINE | Facility: CLINIC | Age: 80
End: 2020-04-28

## 2020-04-28 DIAGNOSIS — N18.30 TYPE 2 DIABETES MELLITUS WITH STAGE 3 CHRONIC KIDNEY DISEASE, WITHOUT LONG-TERM CURRENT USE OF INSULIN: ICD-10-CM

## 2020-04-28 DIAGNOSIS — E11.22 TYPE 2 DIABETES MELLITUS WITH STAGE 3 CHRONIC KIDNEY DISEASE, WITHOUT LONG-TERM CURRENT USE OF INSULIN: ICD-10-CM

## 2020-04-28 NOTE — TELEPHONE ENCOUNTER
----- Message from Diane Og sent at 4/28/2020  1:36 PM CDT -----  Pt is requesting refill's on her SITagliptin (JANUVIA) 50 MG Tab  Pt is out of medication    Please call and advise    [facility] [address]  [id number] [place] - [address]    Thank you

## 2020-05-07 DIAGNOSIS — E11.22 TYPE 2 DIABETES MELLITUS WITH STAGE 3 CHRONIC KIDNEY DISEASE, WITHOUT LONG-TERM CURRENT USE OF INSULIN: ICD-10-CM

## 2020-05-07 DIAGNOSIS — N18.30 TYPE 2 DIABETES MELLITUS WITH STAGE 3 CHRONIC KIDNEY DISEASE, WITHOUT LONG-TERM CURRENT USE OF INSULIN: ICD-10-CM

## 2020-05-11 DIAGNOSIS — E78.5 DM TYPE 2 WITH DIABETIC DYSLIPIDEMIA: ICD-10-CM

## 2020-05-11 DIAGNOSIS — E11.69 DM TYPE 2 WITH DIABETIC DYSLIPIDEMIA: ICD-10-CM

## 2020-05-11 RX ORDER — SIMVASTATIN 40 MG/1
40 TABLET, FILM COATED ORAL NIGHTLY
Qty: 90 TABLET | Refills: 0 | Status: SHIPPED | OUTPATIENT
Start: 2020-05-11 | End: 2020-05-21 | Stop reason: SDUPTHER

## 2020-05-11 NOTE — TELEPHONE ENCOUNTER
----- Message from Lou Guadarrama sent at 5/11/2020  2:14 PM CDT -----  Contact: Self [phone]  Patient would like a refill for simvastatin (ZOCOR) 40 MG tablet sent to [id number] - [place], [place] - [address]. Please advise.

## 2020-05-21 ENCOUNTER — LAB VISIT (OUTPATIENT)
Dept: LAB | Facility: HOSPITAL | Age: 80
End: 2020-05-21
Attending: FAMILY MEDICINE
Payer: MEDICARE

## 2020-05-21 ENCOUNTER — OFFICE VISIT (OUTPATIENT)
Dept: FAMILY MEDICINE | Facility: CLINIC | Age: 80
End: 2020-05-21
Payer: MEDICARE

## 2020-05-21 VITALS
SYSTOLIC BLOOD PRESSURE: 124 MMHG | BODY MASS INDEX: 27.18 KG/M2 | HEART RATE: 67 BPM | DIASTOLIC BLOOD PRESSURE: 60 MMHG | TEMPERATURE: 98 F | HEIGHT: 64 IN | OXYGEN SATURATION: 97 % | WEIGHT: 159.19 LBS

## 2020-05-21 DIAGNOSIS — Z17.0 MALIGNANT NEOPLASM OF LOWER-INNER QUADRANT OF RIGHT BREAST OF FEMALE, ESTROGEN RECEPTOR POSITIVE: ICD-10-CM

## 2020-05-21 DIAGNOSIS — E11.22 TYPE 2 DIABETES MELLITUS WITH STAGE 3 CHRONIC KIDNEY DISEASE, WITHOUT LONG-TERM CURRENT USE OF INSULIN: ICD-10-CM

## 2020-05-21 DIAGNOSIS — N18.4 HYPERTENSIVE KIDNEY DISEASE WITH CHRONIC KIDNEY DISEASE STAGE IV: ICD-10-CM

## 2020-05-21 DIAGNOSIS — E78.5 DM TYPE 2 WITH DIABETIC DYSLIPIDEMIA: ICD-10-CM

## 2020-05-21 DIAGNOSIS — E11.69 DM TYPE 2 WITH DIABETIC DYSLIPIDEMIA: ICD-10-CM

## 2020-05-21 DIAGNOSIS — N18.30 TYPE 2 DIABETES MELLITUS WITH STAGE 3 CHRONIC KIDNEY DISEASE, WITHOUT LONG-TERM CURRENT USE OF INSULIN: ICD-10-CM

## 2020-05-21 DIAGNOSIS — Z79.810 USE OF TAMOXIFEN (NOLVADEX): ICD-10-CM

## 2020-05-21 DIAGNOSIS — E11.22 TYPE 2 DIABETES MELLITUS WITH STAGE 4 CHRONIC KIDNEY DISEASE, WITHOUT LONG-TERM CURRENT USE OF INSULIN: ICD-10-CM

## 2020-05-21 DIAGNOSIS — E78.5 DM TYPE 2 WITH DIABETIC DYSLIPIDEMIA: Primary | ICD-10-CM

## 2020-05-21 DIAGNOSIS — E11.69 DM TYPE 2 WITH DIABETIC DYSLIPIDEMIA: Primary | ICD-10-CM

## 2020-05-21 DIAGNOSIS — N18.4 TYPE 2 DIABETES MELLITUS WITH STAGE 4 CHRONIC KIDNEY DISEASE, WITHOUT LONG-TERM CURRENT USE OF INSULIN: ICD-10-CM

## 2020-05-21 DIAGNOSIS — C50.311 MALIGNANT NEOPLASM OF LOWER-INNER QUADRANT OF RIGHT BREAST OF FEMALE, ESTROGEN RECEPTOR POSITIVE: ICD-10-CM

## 2020-05-21 DIAGNOSIS — I12.9 HYPERTENSIVE KIDNEY DISEASE WITH CHRONIC KIDNEY DISEASE STAGE IV: ICD-10-CM

## 2020-05-21 PROCEDURE — 99214 PR OFFICE/OUTPT VISIT, EST, LEVL IV, 30-39 MIN: ICD-10-PCS | Mod: S$PBB,,, | Performed by: FAMILY MEDICINE

## 2020-05-21 PROCEDURE — 99213 OFFICE O/P EST LOW 20 MIN: CPT | Mod: PBBFAC,PO | Performed by: FAMILY MEDICINE

## 2020-05-21 PROCEDURE — 36415 COLL VENOUS BLD VENIPUNCTURE: CPT | Mod: PO

## 2020-05-21 PROCEDURE — 99214 OFFICE O/P EST MOD 30 MIN: CPT | Mod: S$PBB,,, | Performed by: FAMILY MEDICINE

## 2020-05-21 PROCEDURE — 80061 LIPID PANEL: CPT

## 2020-05-21 PROCEDURE — 99999 PR PBB SHADOW E&M-EST. PATIENT-LVL III: ICD-10-PCS | Mod: PBBFAC,,, | Performed by: FAMILY MEDICINE

## 2020-05-21 PROCEDURE — 83036 HEMOGLOBIN GLYCOSYLATED A1C: CPT

## 2020-05-21 PROCEDURE — 99999 PR PBB SHADOW E&M-EST. PATIENT-LVL III: CPT | Mod: PBBFAC,,, | Performed by: FAMILY MEDICINE

## 2020-05-21 RX ORDER — POTASSIUM CITRATE 10 MEQ/1
TABLET, EXTENDED RELEASE ORAL
COMMUNITY
Start: 2020-04-07 | End: 2021-04-27

## 2020-05-21 RX ORDER — LOSARTAN POTASSIUM 25 MG/1
25 TABLET ORAL DAILY
Qty: 90 TABLET | Refills: 1 | Status: SHIPPED | OUTPATIENT
Start: 2020-05-21 | End: 2021-01-11

## 2020-05-21 RX ORDER — GLIPIZIDE 10 MG/1
10 TABLET ORAL
Qty: 180 TABLET | Refills: 1 | Status: SHIPPED | OUTPATIENT
Start: 2020-05-21 | End: 2021-02-18 | Stop reason: SDUPTHER

## 2020-05-21 RX ORDER — SIMVASTATIN 40 MG/1
40 TABLET, FILM COATED ORAL NIGHTLY
Qty: 90 TABLET | Refills: 1 | Status: SHIPPED | OUTPATIENT
Start: 2020-05-21 | End: 2021-03-01 | Stop reason: SDUPTHER

## 2020-05-21 RX ORDER — POTASSIUM CITRATE 10 MEQ/1
TABLET, EXTENDED RELEASE ORAL
COMMUNITY
Start: 2020-04-07 | End: 2020-05-21 | Stop reason: SDUPTHER

## 2020-05-21 NOTE — PROGRESS NOTES
Subjective:       Patient ID: Whit Sloan is a 79 y.o. female.    Chief Complaint: Hypertension (3 mos); Diabetes; Hyperlipidemia; and Chronic Kidney Disease  80 yo female with HTN, hyperlipidemia, CKD, Stage 4, right breast CA on Tamoxifen and DM, Type 2 presents for f/u of her chronic conditions.  Hypertension   This is a chronic problem. The current episode started more than 1 year ago. The problem is unchanged. The problem is controlled. Associated symptoms include peripheral edema. Pertinent negatives include no blurred vision, chest pain, headaches, orthopnea, palpitations, PND or shortness of breath. The current treatment provides significant improvement. There are no compliance problems.    Diabetes   She presents for her follow-up diabetic visit. She has type 2 diabetes mellitus. There are no hypoglycemic associated symptoms. Pertinent negatives for hypoglycemia include no headaches. Pertinent negatives for diabetes include no blurred vision, no chest pain, no fatigue, no foot paresthesias, no foot ulcerations, no polydipsia, no polyphagia, no polyuria, no visual change and no weakness. There are no hypoglycemic complications. She is compliant with treatment all of the time.   Hyperlipidemia   This is a chronic problem. The current episode started more than 1 year ago. The problem is controlled. Pertinent negatives include no chest pain or shortness of breath.   Is followed by Dr. Sprague for CKD, Stage 4. Had labs done yesterday.Pt to f/u in August 2020.  Review of Systems   Constitutional: Negative for chills, fatigue and fever.   Eyes: Negative for blurred vision.   Respiratory: Negative for shortness of breath.    Cardiovascular: Positive for leg swelling. Negative for chest pain, palpitations, orthopnea and PND.   Gastrointestinal: Negative for abdominal pain, anal bleeding, blood in stool, nausea and vomiting.   Endocrine: Negative for polydipsia, polyphagia and polyuria.   Genitourinary: Negative  for dysuria and hematuria.   Neurological: Negative for weakness and headaches.       Objective:      Physical Exam   Constitutional: She appears well-developed and well-nourished. No distress.   HENT:   Head: Normocephalic and atraumatic.   Neck: Normal range of motion. Neck supple. No JVD present. No thyromegaly present.   Cardiovascular: Normal rate, regular rhythm, normal heart sounds and intact distal pulses. Exam reveals no gallop and no friction rub.   No murmur heard.  Pulses:       Dorsalis pedis pulses are 1+ on the right side, and 1+ on the left side.        Posterior tibial pulses are 1+ on the right side, and 1+ on the left side.   Pulmonary/Chest: Effort normal and breath sounds normal. She has no wheezes. She has no rales.   Abdominal: Soft. Bowel sounds are normal. She exhibits no mass. There is no tenderness.   Musculoskeletal:        Right foot: There is normal range of motion and no deformity.        Left foot: There is normal range of motion and no deformity.   Feet:   Right Foot:   Protective Sensation: 10 sites tested. 10 sites sensed.   Skin Integrity: Negative for ulcer, blister, skin breakdown, erythema, warmth, callus or dry skin.   Left Foot:   Protective Sensation: 10 sites tested. 10 sites sensed.   Skin Integrity: Negative for ulcer, blister, skin breakdown, erythema, warmth, callus or dry skin.   Lymphadenopathy:     She has no cervical adenopathy.   Skin: Skin is warm and dry. She is not diaphoretic.   Vitals reviewed.      Assessment:       See plan  Plan:       DM type 2 with diabetic dyslipidemia  -     Lipid Panel; Future; Expected date: 05/21/2020  -     Hemoglobin A1C; Future; Expected date: 05/21/2020  -     glipiZIDE (GLUCOTROL) 10 MG tablet; Take 1 tablet (10 mg total) by mouth 2 (two) times daily before meals.  Dispense: 180 tablet; Refill: 1  -     simvastatin (ZOCOR) 40 MG tablet; Take 1 tablet (40 mg total) by mouth every evening.  Dispense: 90 tablet; Refill:  1    Hypertensive kidney disease with chronic kidney disease stage IV  -     losartan (COZAAR) 25 MG tablet; Take 1 tablet (25 mg total) by mouth once daily.  Dispense: 90 tablet; Refill: 1    Type 2 diabetes mellitus with stage 4 chronic kidney disease, without long-term current use of insulin  -     SITagliptin (JANUVIA) 50 MG Tab; Take 1 tablet (50 mg total) by mouth once daily.  Dispense: 90 tablet; Refill: 1    Malignant neoplasm of lower-inner quadrant of right breast of female, estrogen receptor positive    Use of tamoxifen (Nolvadex)    BMI 27.0-27.9,adult    F/U in 4 months.

## 2020-05-22 LAB
CHOLEST SERPL-MCNC: 151 MG/DL (ref 120–199)
CHOLEST/HDLC SERPL: 2.2 {RATIO} (ref 2–5)
ESTIMATED AVG GLUCOSE: 186 MG/DL (ref 68–131)
HBA1C MFR BLD HPLC: 8.1 % (ref 4–5.6)
HDLC SERPL-MCNC: 68 MG/DL (ref 40–75)
HDLC SERPL: 45 % (ref 20–50)
LDLC SERPL CALC-MCNC: 69 MG/DL (ref 63–159)
NONHDLC SERPL-MCNC: 83 MG/DL
TRIGL SERPL-MCNC: 70 MG/DL (ref 30–150)

## 2020-05-28 ENCOUNTER — TELEPHONE (OUTPATIENT)
Dept: FAMILY MEDICINE | Facility: CLINIC | Age: 80
End: 2020-05-28

## 2020-05-28 DIAGNOSIS — N18.4 TYPE 2 DIABETES MELLITUS WITH STAGE 4 CHRONIC KIDNEY DISEASE, WITHOUT LONG-TERM CURRENT USE OF INSULIN: ICD-10-CM

## 2020-05-28 DIAGNOSIS — E11.22 TYPE 2 DIABETES MELLITUS WITH STAGE 4 CHRONIC KIDNEY DISEASE, WITHOUT LONG-TERM CURRENT USE OF INSULIN: ICD-10-CM

## 2020-06-01 ENCOUNTER — TELEPHONE (OUTPATIENT)
Dept: FAMILY MEDICINE | Facility: CLINIC | Age: 80
End: 2020-06-01

## 2020-06-01 NOTE — TELEPHONE ENCOUNTER
----- Message from Seda Guzman sent at 6/1/2020  9:36 AM CDT -----  Contact: self [phone]  Patient is calling about medication that she was told was going to be called in for her. Please call

## 2020-06-01 NOTE — TELEPHONE ENCOUNTER
Patient states she is taking Glipizide 10 mg twice a day and Januvia 50 mg as directed by Dr Meyers back on 05/28/20.  Patient will do lab prior her visit with Dr Meyers.

## 2020-07-17 DIAGNOSIS — Z71.89 COMPLEX CARE COORDINATION: ICD-10-CM

## 2020-08-17 NOTE — PROGRESS NOTES
SW called pt to assess needs, particularly those related to Covid. Pt said she is changing pharmacies because of her insurance.  She said she has canceled her upcoming medical appts due to fear of Covid infection.  LURDES explained televisits and confirmed that pt has a televisit with her provider tomorrow.  Pt expressed gratitude for the call as she did not know she had a televisit scheduled.  SW provided name and contact information and encouraged pt to call with any future needs.     negative...

## 2020-09-17 ENCOUNTER — TELEPHONE (OUTPATIENT)
Dept: FAMILY MEDICINE | Facility: CLINIC | Age: 80
End: 2020-09-17

## 2020-09-17 DIAGNOSIS — E78.5 DM TYPE 2 WITH DIABETIC DYSLIPIDEMIA: Primary | ICD-10-CM

## 2020-09-17 DIAGNOSIS — E11.69 DM TYPE 2 WITH DIABETIC DYSLIPIDEMIA: Primary | ICD-10-CM

## 2020-09-17 NOTE — TELEPHONE ENCOUNTER
----- Message from Oralia Tanner sent at 9/17/2020  2:49 PM CDT -----  Contact: [phone]  Type:  Needs Medical Advice    Who Called: PT  Reason for Call: regarding if the Pt needs Blood Work for her appt on 9/22  Would the patient rather a call back or a response via [facility]?  Call back  Best Call Back Number: [phone]

## 2020-09-17 NOTE — TELEPHONE ENCOUNTER
Called patient to schedule lab work. No answer, left message for patient to return office's phone call.

## 2020-09-22 ENCOUNTER — OFFICE VISIT (OUTPATIENT)
Dept: FAMILY MEDICINE | Facility: CLINIC | Age: 80
End: 2020-09-22
Payer: MEDICARE

## 2020-09-22 ENCOUNTER — LAB VISIT (OUTPATIENT)
Dept: LAB | Facility: HOSPITAL | Age: 80
End: 2020-09-22
Attending: FAMILY MEDICINE
Payer: MEDICARE

## 2020-09-22 VITALS
HEIGHT: 64 IN | HEART RATE: 71 BPM | OXYGEN SATURATION: 96 % | DIASTOLIC BLOOD PRESSURE: 60 MMHG | WEIGHT: 154.13 LBS | TEMPERATURE: 98 F | SYSTOLIC BLOOD PRESSURE: 114 MMHG | BODY MASS INDEX: 26.31 KG/M2

## 2020-09-22 DIAGNOSIS — C50.311 MALIGNANT NEOPLASM OF LOWER-INNER QUADRANT OF RIGHT BREAST OF FEMALE, ESTROGEN RECEPTOR POSITIVE: ICD-10-CM

## 2020-09-22 DIAGNOSIS — Z79.810 USE OF TAMOXIFEN (NOLVADEX): ICD-10-CM

## 2020-09-22 DIAGNOSIS — N18.4 TYPE 2 DIABETES MELLITUS WITH STAGE 4 CHRONIC KIDNEY DISEASE, WITHOUT LONG-TERM CURRENT USE OF INSULIN: ICD-10-CM

## 2020-09-22 DIAGNOSIS — E11.69 DM TYPE 2 WITH DIABETIC DYSLIPIDEMIA: ICD-10-CM

## 2020-09-22 DIAGNOSIS — I12.9 HYPERTENSIVE KIDNEY DISEASE WITH CHRONIC KIDNEY DISEASE STAGE IV: Primary | ICD-10-CM

## 2020-09-22 DIAGNOSIS — E11.22 TYPE 2 DIABETES MELLITUS WITH STAGE 4 CHRONIC KIDNEY DISEASE, WITHOUT LONG-TERM CURRENT USE OF INSULIN: ICD-10-CM

## 2020-09-22 DIAGNOSIS — N18.4 HYPERTENSIVE KIDNEY DISEASE WITH CHRONIC KIDNEY DISEASE STAGE IV: Primary | ICD-10-CM

## 2020-09-22 DIAGNOSIS — Z17.0 MALIGNANT NEOPLASM OF LOWER-INNER QUADRANT OF RIGHT BREAST OF FEMALE, ESTROGEN RECEPTOR POSITIVE: ICD-10-CM

## 2020-09-22 DIAGNOSIS — E78.5 DM TYPE 2 WITH DIABETIC DYSLIPIDEMIA: ICD-10-CM

## 2020-09-22 DIAGNOSIS — Z23 NEED FOR INFLUENZA VACCINATION: ICD-10-CM

## 2020-09-22 LAB
ALBUMIN SERPL BCP-MCNC: 3.7 G/DL (ref 3.5–5.2)
ALP SERPL-CCNC: 46 U/L (ref 55–135)
ALT SERPL W/O P-5'-P-CCNC: 11 U/L (ref 10–44)
ANION GAP SERPL CALC-SCNC: 11 MMOL/L (ref 8–16)
AST SERPL-CCNC: 19 U/L (ref 10–40)
BILIRUB SERPL-MCNC: 0.5 MG/DL (ref 0.1–1)
BUN SERPL-MCNC: 33 MG/DL (ref 8–23)
CALCIUM SERPL-MCNC: 10.1 MG/DL (ref 8.7–10.5)
CHLORIDE SERPL-SCNC: 109 MMOL/L (ref 95–110)
CO2 SERPL-SCNC: 22 MMOL/L (ref 23–29)
CREAT SERPL-MCNC: 2.1 MG/DL (ref 0.5–1.4)
EST. GFR  (AFRICAN AMERICAN): 24.9 ML/MIN/1.73 M^2
EST. GFR  (NON AFRICAN AMERICAN): 21.6 ML/MIN/1.73 M^2
ESTIMATED AVG GLUCOSE: 163 MG/DL (ref 68–131)
GLUCOSE SERPL-MCNC: 133 MG/DL (ref 70–110)
HBA1C MFR BLD HPLC: 7.3 % (ref 4–5.6)
POTASSIUM SERPL-SCNC: 4.7 MMOL/L (ref 3.5–5.1)
PROT SERPL-MCNC: 7.3 G/DL (ref 6–8.4)
SODIUM SERPL-SCNC: 142 MMOL/L (ref 136–145)

## 2020-09-22 PROCEDURE — 99999 PR PBB SHADOW E&M-EST. PATIENT-LVL IV: CPT | Mod: PBBFAC,,, | Performed by: FAMILY MEDICINE

## 2020-09-22 PROCEDURE — 83036 HEMOGLOBIN GLYCOSYLATED A1C: CPT

## 2020-09-22 PROCEDURE — 80053 COMPREHEN METABOLIC PANEL: CPT

## 2020-09-22 PROCEDURE — 36415 COLL VENOUS BLD VENIPUNCTURE: CPT | Mod: PO

## 2020-09-22 PROCEDURE — 99214 OFFICE O/P EST MOD 30 MIN: CPT | Mod: S$PBB,,, | Performed by: FAMILY MEDICINE

## 2020-09-22 PROCEDURE — 90694 VACC AIIV4 NO PRSRV 0.5ML IM: CPT | Mod: PBBFAC,PO

## 2020-09-22 PROCEDURE — 99214 PR OFFICE/OUTPT VISIT, EST, LEVL IV, 30-39 MIN: ICD-10-PCS | Mod: S$PBB,,, | Performed by: FAMILY MEDICINE

## 2020-09-22 PROCEDURE — 99999 PR PBB SHADOW E&M-EST. PATIENT-LVL IV: ICD-10-PCS | Mod: PBBFAC,,, | Performed by: FAMILY MEDICINE

## 2020-09-22 PROCEDURE — G0008 ADMIN INFLUENZA VIRUS VAC: HCPCS | Mod: PBBFAC,PO

## 2020-09-22 PROCEDURE — 99214 OFFICE O/P EST MOD 30 MIN: CPT | Mod: PBBFAC,PO | Performed by: FAMILY MEDICINE

## 2020-09-22 RX ORDER — FERROUS SULFATE 325(65) MG
1 TABLET ORAL
COMMUNITY
Start: 2020-08-17 | End: 2023-05-02 | Stop reason: ALTCHOICE

## 2020-09-22 RX ORDER — DICLOFENAC SODIUM 10 MG/G
1 GEL TOPICAL
COMMUNITY
Start: 2020-06-14

## 2020-09-22 NOTE — PROGRESS NOTES
Subjective:       Patient ID: Whit Sloan is a 80 y.o. female.    Chief Complaint: Hypertension (4 mos ), Diabetes, Hyperlipidemia, and Chronic Kidney Disease  81 yo female with HTN, hyperlipidemia, CKD, Stage 4, right breast CA on Tamoxifen and DM, Type 2 presents for f/u of her chronic conditions.   Hypertension  This is a chronic problem. The current episode started more than 1 year ago. The problem is unchanged. The problem is controlled. Pertinent negatives include no blurred vision, chest pain, headaches, orthopnea, palpitations, peripheral edema, PND or shortness of breath. The current treatment provides significant improvement. Hypertensive end-organ damage includes kidney disease.   Diabetes  She presents for her follow-up diabetic visit. She has type 2 diabetes mellitus. Pertinent negatives for hypoglycemia include no headaches. Pertinent negatives for diabetes include no blurred vision, no chest pain, no polydipsia, no polyphagia and no polyuria. Her weight is stable. She is following a diabetic diet. She participates in exercise three times a week. She monitors blood glucose at home 1-2 x per day. Her overall blood glucose range is  mg/dl. Eye exam is current.   Hyperlipidemia  This is a chronic problem. The current episode started more than 1 year ago. Pertinent negatives include no chest pain or shortness of breath.   Pt has been taking Glipizide twice daily.  Review of Systems   Constitutional: Negative for chills.   Eyes: Negative for blurred vision and visual disturbance.   Respiratory: Negative for shortness of breath.    Cardiovascular: Negative for chest pain, palpitations, orthopnea, leg swelling and PND.   Gastrointestinal: Negative for abdominal pain, anal bleeding, blood in stool, nausea and vomiting.   Endocrine: Negative for polydipsia, polyphagia and polyuria.   Genitourinary: Negative for dysuria and hematuria.   Neurological: Negative for headaches.       Objective:       Physical Exam  Vitals signs reviewed.   Constitutional:       Appearance: Normal appearance.   HENT:      Head: Normocephalic and atraumatic.      Right Ear: External ear normal.      Left Ear: External ear normal.   Eyes:      General:         Right eye: No discharge.         Left eye: No discharge.      Extraocular Movements: Extraocular movements intact.      Conjunctiva/sclera: Conjunctivae normal.   Neck:      Musculoskeletal: Normal range of motion and neck supple. No muscular tenderness.      Vascular: No carotid bruit.   Cardiovascular:      Rate and Rhythm: Normal rate and regular rhythm.      Pulses: Normal pulses.      Heart sounds: Normal heart sounds. No murmur. No gallop.    Pulmonary:      Effort: Pulmonary effort is normal.      Breath sounds: Normal breath sounds. No wheezing or rales.   Abdominal:      General: Bowel sounds are normal.      Palpations: Abdomen is soft. There is no mass.      Tenderness: There is no abdominal tenderness.   Skin:     General: Skin is warm and dry.   Neurological:      Mental Status: She is alert.   Psychiatric:         Mood and Affect: Mood normal.         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

## 2020-10-20 ENCOUNTER — LAB VISIT (OUTPATIENT)
Dept: LAB | Facility: HOSPITAL | Age: 80
End: 2020-10-20
Payer: MEDICARE

## 2020-10-20 ENCOUNTER — OFFICE VISIT (OUTPATIENT)
Dept: HEMATOLOGY/ONCOLOGY | Facility: CLINIC | Age: 80
End: 2020-10-20
Payer: MEDICARE

## 2020-10-20 VITALS
WEIGHT: 155.19 LBS | TEMPERATURE: 99 F | HEIGHT: 64 IN | OXYGEN SATURATION: 99 % | BODY MASS INDEX: 26.49 KG/M2 | SYSTOLIC BLOOD PRESSURE: 159 MMHG | RESPIRATION RATE: 18 BRPM | HEART RATE: 78 BPM | DIASTOLIC BLOOD PRESSURE: 72 MMHG

## 2020-10-20 DIAGNOSIS — Z79.810 USE OF TAMOXIFEN (NOLVADEX): ICD-10-CM

## 2020-10-20 DIAGNOSIS — E11.22 TYPE 2 DIABETES MELLITUS WITH STAGE 4 CHRONIC KIDNEY DISEASE, WITHOUT LONG-TERM CURRENT USE OF INSULIN: ICD-10-CM

## 2020-10-20 DIAGNOSIS — C50.311 MALIGNANT NEOPLASM OF LOWER-INNER QUADRANT OF RIGHT BREAST OF FEMALE, ESTROGEN RECEPTOR POSITIVE: Primary | ICD-10-CM

## 2020-10-20 DIAGNOSIS — Z17.0 MALIGNANT NEOPLASM OF LOWER-INNER QUADRANT OF RIGHT BREAST OF FEMALE, ESTROGEN RECEPTOR POSITIVE: Primary | ICD-10-CM

## 2020-10-20 DIAGNOSIS — C50.311 MALIGNANT NEOPLASM OF LOWER-INNER QUADRANT OF RIGHT BREAST OF FEMALE, ESTROGEN RECEPTOR POSITIVE: ICD-10-CM

## 2020-10-20 DIAGNOSIS — Z17.0 MALIGNANT NEOPLASM OF LOWER-INNER QUADRANT OF RIGHT BREAST OF FEMALE, ESTROGEN RECEPTOR POSITIVE: ICD-10-CM

## 2020-10-20 DIAGNOSIS — N18.4 TYPE 2 DIABETES MELLITUS WITH STAGE 4 CHRONIC KIDNEY DISEASE, WITHOUT LONG-TERM CURRENT USE OF INSULIN: ICD-10-CM

## 2020-10-20 PROBLEM — N18.9 ANEMIA DUE TO CHRONIC KIDNEY DISEASE: Status: ACTIVE | Noted: 2020-10-20

## 2020-10-20 PROBLEM — D63.1 ANEMIA DUE TO CHRONIC KIDNEY DISEASE: Status: ACTIVE | Noted: 2020-10-20

## 2020-10-20 LAB
ALBUMIN SERPL BCP-MCNC: 3.6 G/DL (ref 3.5–5.2)
ALP SERPL-CCNC: 50 U/L (ref 55–135)
ALT SERPL W/O P-5'-P-CCNC: 12 U/L (ref 10–44)
ANION GAP SERPL CALC-SCNC: 8 MMOL/L (ref 8–16)
AST SERPL-CCNC: 20 U/L (ref 10–40)
BASOPHILS # BLD AUTO: 0.02 K/UL (ref 0–0.2)
BASOPHILS NFR BLD: 0.3 % (ref 0–1.9)
BILIRUB SERPL-MCNC: 0.5 MG/DL (ref 0.1–1)
BUN SERPL-MCNC: 37 MG/DL (ref 8–23)
CALCIUM SERPL-MCNC: 9.8 MG/DL (ref 8.7–10.5)
CHLORIDE SERPL-SCNC: 109 MMOL/L (ref 95–110)
CO2 SERPL-SCNC: 23 MMOL/L (ref 23–29)
CREAT SERPL-MCNC: 2.3 MG/DL (ref 0.5–1.4)
DIFFERENTIAL METHOD: ABNORMAL
EOSINOPHIL # BLD AUTO: 0.1 K/UL (ref 0–0.5)
EOSINOPHIL NFR BLD: 1.9 % (ref 0–8)
ERYTHROCYTE [DISTWIDTH] IN BLOOD BY AUTOMATED COUNT: 13.2 % (ref 11.5–14.5)
EST. GFR  (AFRICAN AMERICAN): 22.3 ML/MIN/1.73 M^2
EST. GFR  (NON AFRICAN AMERICAN): 19.3 ML/MIN/1.73 M^2
GLUCOSE SERPL-MCNC: 242 MG/DL (ref 70–110)
HCT VFR BLD AUTO: 38.4 % (ref 37–48.5)
HGB BLD-MCNC: 11.5 G/DL (ref 12–16)
IMM GRANULOCYTES # BLD AUTO: 0.02 K/UL (ref 0–0.04)
IMM GRANULOCYTES NFR BLD AUTO: 0.3 % (ref 0–0.5)
LYMPHOCYTES # BLD AUTO: 1.6 K/UL (ref 1–4.8)
LYMPHOCYTES NFR BLD: 26.9 % (ref 18–48)
MCH RBC QN AUTO: 27.7 PG (ref 27–31)
MCHC RBC AUTO-ENTMCNC: 29.9 G/DL (ref 32–36)
MCV RBC AUTO: 93 FL (ref 82–98)
MONOCYTES # BLD AUTO: 0.6 K/UL (ref 0.3–1)
MONOCYTES NFR BLD: 11 % (ref 4–15)
NEUTROPHILS # BLD AUTO: 3.5 K/UL (ref 1.8–7.7)
NEUTROPHILS NFR BLD: 59.6 % (ref 38–73)
NRBC BLD-RTO: 0 /100 WBC
PLATELET # BLD AUTO: 166 K/UL (ref 150–350)
PMV BLD AUTO: 11.4 FL (ref 9.2–12.9)
POTASSIUM SERPL-SCNC: 4.6 MMOL/L (ref 3.5–5.1)
PROT SERPL-MCNC: 7.2 G/DL (ref 6–8.4)
RBC # BLD AUTO: 4.15 M/UL (ref 4–5.4)
SODIUM SERPL-SCNC: 140 MMOL/L (ref 136–145)
WBC # BLD AUTO: 5.84 K/UL (ref 3.9–12.7)

## 2020-10-20 PROCEDURE — 99214 PR OFFICE/OUTPT VISIT, EST, LEVL IV, 30-39 MIN: ICD-10-PCS | Mod: S$PBB,,, | Performed by: NURSE PRACTITIONER

## 2020-10-20 PROCEDURE — 99214 OFFICE O/P EST MOD 30 MIN: CPT | Mod: S$PBB,,, | Performed by: NURSE PRACTITIONER

## 2020-10-20 PROCEDURE — 80053 COMPREHEN METABOLIC PANEL: CPT

## 2020-10-20 PROCEDURE — 99999 PR PBB SHADOW E&M-EST. PATIENT-LVL IV: ICD-10-PCS | Mod: PBBFAC,,, | Performed by: NURSE PRACTITIONER

## 2020-10-20 PROCEDURE — 85025 COMPLETE CBC W/AUTO DIFF WBC: CPT

## 2020-10-20 PROCEDURE — 36415 COLL VENOUS BLD VENIPUNCTURE: CPT

## 2020-10-20 PROCEDURE — 99999 PR PBB SHADOW E&M-EST. PATIENT-LVL IV: CPT | Mod: PBBFAC,,, | Performed by: NURSE PRACTITIONER

## 2020-10-20 PROCEDURE — 99214 OFFICE O/P EST MOD 30 MIN: CPT | Mod: PBBFAC | Performed by: NURSE PRACTITIONER

## 2020-10-20 NOTE — PROGRESS NOTES
Subjective:       Patient ID: Whit Sloan is a 80 y.o. female.    Chief Complaint: Malignant neoplasm of lower-inner quadrant of right breast o    HPI       This is a 81 yo female who presents for follow-up on Tamoxifen.    Feels good today.   Stays active.   Appetite good.   Weight stable.   No pain issues.      Oncology History:  - 4/6/15 underwent routine mammogram and a focal asymmetric density in the right breast requiring additional evaluation seen.  Breast MRI was initially recommended and read as BI-RADS Category 0: Incomplete needing additional imaging   This was addended on 04/09/2015 and upon further review, focal asymmetric density in the right breast was read ads probably  benign. Follow-up in 6 months is recommended.  - 10/22/15 she underwent follow-up diagnostic mammogram which revealed stable focal asymmetry in the right breast is benign-negative.  Follow-up in 6  months is recommended.    ACR BI-RADS Category 2: Benign  - On 5/9/16 she underwent breast ultrasound  Stable focal asymmetry in the right breast is suspicious.  Histology using core biopsy was recommended.  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   HENT: Negative for congestion, rhinorrhea, sore throat and trouble swallowing.    Eyes: Negative for visual disturbance.   Respiratory: Negative for cough, chest tightness and shortness of breath.    Cardiovascular: Negative for chest pain, palpitations and leg swelling.   Gastrointestinal: Negative for abdominal pain,  blood in stool, constipation, diarrhea, nausea and vomiting.   Genitourinary: Negative for dysuria, hematuria and vaginal bleeding.   Musculoskeletal: Negative for arthralgias, back pain and myalgias.   Skin: Negative for pallor and rash.   Neurological: Negative for dizziness, weakness and headaches.   Hematological: Negative for adenopathy. Does not bruise/bleed easily.   Psychiatric/Behavioral: Negative for dysphoric mood and suicidal ideas. The patient is not nervous/anxious.        Objective:      Physical Exam  Vitals signs and nursing note reviewed.   Constitutional:       General: She is not in acute distress.     Appearance: Normal appearance. She is well-developed.   HENT:      Head: Normocephalic and atraumatic.   Eyes:      General: Lids are normal. No scleral icterus.     Conjunctiva/sclera: Conjunctivae normal.      Pupils: Pupils are equal, round, and reactive to light.   Neck:      Musculoskeletal: Normal range of motion and neck supple.      Thyroid: No thyromegaly.      Vascular: No JVD.      Trachea: Trachea normal.   Cardiovascular:      Rate and Rhythm: Normal rate and regular rhythm.      Heart sounds: Normal heart sounds.   Pulmonary:      Effort: Pulmonary effort is normal.      Breath sounds: Normal breath sounds. No wheezing.      Comments: Right breast with well healed lumpectomy incision and some generalized fibrosis at lateral aspect of breast secondary to radiation.No mass, nodule or skin changes. Left breast without mass, nodule or skin changes. No axillary or supraclavicular adenopathy.   Abdominal:      General: Bowel sounds are normal. There is no distension.      Palpations: Abdomen is soft. There is no mass.      Tenderness: There is no abdominal tenderness.      Comments: No organomegaly.    Musculoskeletal: Normal range of motion.      Comments: No spinal or paraspinal tenderness.    Lymphadenopathy:      Head:      Right side of head: No submental or submandibular adenopathy.       Left side of head: No submental or submandibular adenopathy.      Cervical: No cervical adenopathy.      Upper Body:      Right upper body: No supraclavicular or axillary adenopathy.      Left upper body: No supraclavicular or axillary adenopathy.   Skin:     General: Skin is warm and dry.      Capillary Refill: Capillary refill takes less than 2 seconds.      Findings: No bruising or rash.      Nails: There is no clubbing.     Neurological:      Mental Status: She is alert and oriented to person, place, and time.   Psychiatric:         Mood and Affect: Mood normal.         Speech: Speech normal.         Behavior: Behavior normal.         Assessment:       1. 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 Use of tamoxifen (Nolvadex)    3. Type 2 diabetes mellitus with stage 4 chronic kidney disease, without long-term current use of insulin        Plan:          Doing well, JESENIA clinically.   Continue Tamoxifen through 8/2021.   RTC 6 months to see me  Next mammogram in 12/2020  Knows to call for any issues  Labs reviewed and discussed. Anemia parameters improved.   F/u with PCP, nephrology  and other established MD's.   Encouraged Hydration.      Patient is in agreement with the proposed treatment plan. All questions were answered to the patient's satisfaction. Pt knows to call clinic for any new or worsening symptoms and if anything is needed before the next clinic visit.      BIJAL FengP-C  Hematology & Oncology  [facility] [place] [address]  ph. [phone]  Fax. [phone]     I spent 30 minutes (face to face) with the patient, more than 50% was in counseling and coordination of care as detailed above.

## 2020-12-21 ENCOUNTER — HOSPITAL ENCOUNTER (OUTPATIENT)
Dept: RADIOLOGY | Facility: HOSPITAL | Age: 80
Discharge: HOME | End: 2020-12-21
Attending: NURSE PRACTITIONER
Payer: MEDICARE

## 2020-12-21 DIAGNOSIS — Z17.0 MALIGNANT NEOPLASM OF LOWER-INNER QUADRANT OF RIGHT BREAST OF FEMALE, ESTROGEN RECEPTOR POSITIVE: ICD-10-CM

## 2020-12-21 DIAGNOSIS — C50.311 MALIGNANT NEOPLASM OF LOWER-INNER QUADRANT OF RIGHT BREAST OF FEMALE, ESTROGEN RECEPTOR POSITIVE: ICD-10-CM

## 2020-12-21 PROCEDURE — 77062 BREAST TOMOSYNTHESIS BI: CPT | Mod: 26,,, | Performed by: RADIOLOGY

## 2020-12-21 PROCEDURE — 77066 MAMMO DIGITAL DIAGNOSTIC BILAT WITH TOMO: ICD-10-PCS | Mod: 26,,, | Performed by: RADIOLOGY

## 2020-12-21 PROCEDURE — 77066 DX MAMMO INCL CAD BI: CPT | Mod: 26,,, | Performed by: RADIOLOGY

## 2020-12-21 PROCEDURE — 77062 BREAST TOMOSYNTHESIS BI: CPT | Mod: TC

## 2020-12-21 PROCEDURE — 77062 MAMMO DIGITAL DIAGNOSTIC BILAT WITH TOMO: ICD-10-PCS | Mod: 26,,, | Performed by: RADIOLOGY

## 2021-01-04 DIAGNOSIS — C50.919 MALIGNANT NEOPLASM OF BREAST IN FEMALE, ESTROGEN RECEPTOR POSITIVE, UNSPECIFIED LATERALITY, UNSPECIFIED SITE OF BREAST: ICD-10-CM

## 2021-01-04 DIAGNOSIS — Z17.0 MALIGNANT NEOPLASM OF BREAST IN FEMALE, ESTROGEN RECEPTOR POSITIVE, UNSPECIFIED LATERALITY, UNSPECIFIED SITE OF BREAST: ICD-10-CM

## 2021-01-04 RX ORDER — TAMOXIFEN CITRATE 20 MG/1
20 TABLET ORAL DAILY
Qty: 90 TABLET | Refills: 4 | Status: SHIPPED | OUTPATIENT
Start: 2021-01-04 | End: 2022-01-07

## 2021-01-26 ENCOUNTER — OFFICE VISIT (OUTPATIENT)
Dept: FAMILY MEDICINE | Facility: CLINIC | Age: 81
End: 2021-01-26
Payer: MEDICARE

## 2021-01-26 ENCOUNTER — LAB VISIT (OUTPATIENT)
Dept: LAB | Facility: HOSPITAL | Age: 81
End: 2021-01-26
Attending: FAMILY MEDICINE
Payer: MEDICARE

## 2021-01-26 VITALS
OXYGEN SATURATION: 98 % | HEART RATE: 70 BPM | TEMPERATURE: 97 F | WEIGHT: 155.44 LBS | HEIGHT: 64 IN | BODY MASS INDEX: 26.54 KG/M2 | SYSTOLIC BLOOD PRESSURE: 126 MMHG | DIASTOLIC BLOOD PRESSURE: 60 MMHG

## 2021-01-26 DIAGNOSIS — E11.69 DM TYPE 2 WITH DIABETIC DYSLIPIDEMIA: ICD-10-CM

## 2021-01-26 DIAGNOSIS — N18.4 TYPE 2 DIABETES MELLITUS WITH STAGE 4 CHRONIC KIDNEY DISEASE, WITHOUT LONG-TERM CURRENT USE OF INSULIN: ICD-10-CM

## 2021-01-26 DIAGNOSIS — E11.22 TYPE 2 DIABETES MELLITUS WITH STAGE 4 CHRONIC KIDNEY DISEASE, WITHOUT LONG-TERM CURRENT USE OF INSULIN: ICD-10-CM

## 2021-01-26 DIAGNOSIS — N18.4 ANEMIA DUE TO STAGE 4 CHRONIC KIDNEY DISEASE: ICD-10-CM

## 2021-01-26 DIAGNOSIS — Z79.810 USE OF TAMOXIFEN (NOLVADEX): ICD-10-CM

## 2021-01-26 DIAGNOSIS — C50.311 MALIGNANT NEOPLASM OF LOWER-INNER QUADRANT OF RIGHT BREAST OF FEMALE, ESTROGEN RECEPTOR POSITIVE: ICD-10-CM

## 2021-01-26 DIAGNOSIS — I12.9 HYPERTENSIVE KIDNEY DISEASE WITH CHRONIC KIDNEY DISEASE STAGE IV: Primary | ICD-10-CM

## 2021-01-26 DIAGNOSIS — Z78.0 ASYMPTOMATIC MENOPAUSAL STATE: ICD-10-CM

## 2021-01-26 DIAGNOSIS — M85.80 OSTEOPENIA DETERMINED BY X-RAY: ICD-10-CM

## 2021-01-26 DIAGNOSIS — J31.0 CHRONIC RHINITIS: ICD-10-CM

## 2021-01-26 DIAGNOSIS — N18.4 HYPERTENSIVE KIDNEY DISEASE WITH CHRONIC KIDNEY DISEASE STAGE IV: Primary | ICD-10-CM

## 2021-01-26 DIAGNOSIS — D63.1 ANEMIA DUE TO STAGE 4 CHRONIC KIDNEY DISEASE: ICD-10-CM

## 2021-01-26 DIAGNOSIS — E78.5 DM TYPE 2 WITH DIABETIC DYSLIPIDEMIA: ICD-10-CM

## 2021-01-26 DIAGNOSIS — Z17.0 MALIGNANT NEOPLASM OF LOWER-INNER QUADRANT OF RIGHT BREAST OF FEMALE, ESTROGEN RECEPTOR POSITIVE: ICD-10-CM

## 2021-01-26 DIAGNOSIS — F32.1 CURRENT MODERATE EPISODE OF MAJOR DEPRESSIVE DISORDER, UNSPECIFIED WHETHER RECURRENT: ICD-10-CM

## 2021-01-26 PROCEDURE — 99214 PR OFFICE/OUTPT VISIT, EST, LEVL IV, 30-39 MIN: ICD-10-PCS | Mod: S$PBB,,, | Performed by: FAMILY MEDICINE

## 2021-01-26 PROCEDURE — 36415 COLL VENOUS BLD VENIPUNCTURE: CPT | Mod: PO

## 2021-01-26 PROCEDURE — 80061 LIPID PANEL: CPT

## 2021-01-26 PROCEDURE — 99214 OFFICE O/P EST MOD 30 MIN: CPT | Mod: S$PBB,,, | Performed by: FAMILY MEDICINE

## 2021-01-26 PROCEDURE — 83036 HEMOGLOBIN GLYCOSYLATED A1C: CPT

## 2021-01-26 PROCEDURE — 99999 PR PBB SHADOW E&M-EST. PATIENT-LVL IV: CPT | Mod: PBBFAC,,, | Performed by: FAMILY MEDICINE

## 2021-01-26 PROCEDURE — 99999 PR PBB SHADOW E&M-EST. PATIENT-LVL IV: ICD-10-PCS | Mod: PBBFAC,,, | Performed by: FAMILY MEDICINE

## 2021-01-26 PROCEDURE — 80053 COMPREHEN METABOLIC PANEL: CPT

## 2021-01-26 PROCEDURE — 99214 OFFICE O/P EST MOD 30 MIN: CPT | Mod: PBBFAC,PO | Performed by: FAMILY MEDICINE

## 2021-01-26 RX ORDER — FLUTICASONE PROPIONATE 50 MCG
1 SPRAY, SUSPENSION (ML) NASAL DAILY
Qty: 16 G | Refills: 3 | Status: SHIPPED | OUTPATIENT
Start: 2021-01-26 | End: 2021-07-28

## 2021-01-27 LAB
ALBUMIN SERPL BCP-MCNC: 3.6 G/DL (ref 3.5–5.2)
ALP SERPL-CCNC: 53 U/L (ref 55–135)
ALT SERPL W/O P-5'-P-CCNC: 11 U/L (ref 10–44)
ANION GAP SERPL CALC-SCNC: 11 MMOL/L (ref 8–16)
AST SERPL-CCNC: 20 U/L (ref 10–40)
BILIRUB SERPL-MCNC: 0.5 MG/DL (ref 0.1–1)
BUN SERPL-MCNC: 32 MG/DL (ref 8–23)
CALCIUM SERPL-MCNC: 9.7 MG/DL (ref 8.7–10.5)
CHLORIDE SERPL-SCNC: 109 MMOL/L (ref 95–110)
CHOLEST SERPL-MCNC: 140 MG/DL (ref 120–199)
CHOLEST/HDLC SERPL: 2.1 {RATIO} (ref 2–5)
CO2 SERPL-SCNC: 20 MMOL/L (ref 23–29)
CREAT SERPL-MCNC: 2.1 MG/DL (ref 0.5–1.4)
EST. GFR  (AFRICAN AMERICAN): 24.9 ML/MIN/1.73 M^2
EST. GFR  (NON AFRICAN AMERICAN): 21.6 ML/MIN/1.73 M^2
ESTIMATED AVG GLUCOSE: 169 MG/DL (ref 68–131)
GLUCOSE SERPL-MCNC: 128 MG/DL (ref 70–110)
HBA1C MFR BLD HPLC: 7.5 % (ref 4–5.6)
HDLC SERPL-MCNC: 67 MG/DL (ref 40–75)
HDLC SERPL: 47.9 % (ref 20–50)
LDLC SERPL CALC-MCNC: 58.6 MG/DL (ref 63–159)
NONHDLC SERPL-MCNC: 73 MG/DL
POTASSIUM SERPL-SCNC: 5 MMOL/L (ref 3.5–5.1)
PROT SERPL-MCNC: 7.3 G/DL (ref 6–8.4)
SODIUM SERPL-SCNC: 140 MMOL/L (ref 136–145)
TRIGL SERPL-MCNC: 72 MG/DL (ref 30–150)

## 2021-01-28 ENCOUNTER — HOSPITAL ENCOUNTER (OUTPATIENT)
Dept: RADIOLOGY | Facility: OTHER | Age: 81
Discharge: HOME | End: 2021-01-28
Attending: FAMILY MEDICINE
Payer: MEDICARE

## 2021-01-28 DIAGNOSIS — M85.80 OSTEOPENIA DETERMINED BY X-RAY: ICD-10-CM

## 2021-01-28 DIAGNOSIS — Z78.0 ASYMPTOMATIC MENOPAUSAL STATE: ICD-10-CM

## 2021-01-28 PROCEDURE — 77080 DEXA BONE DENSITY SPINE HIP: ICD-10-PCS | Mod: 26,,, | Performed by: RADIOLOGY

## 2021-01-28 PROCEDURE — 77080 DXA BONE DENSITY AXIAL: CPT | Mod: 26,,, | Performed by: RADIOLOGY

## 2021-01-28 PROCEDURE — 77080 DXA BONE DENSITY AXIAL: CPT | Mod: TC

## 2021-03-01 DIAGNOSIS — E11.69 DM TYPE 2 WITH DIABETIC DYSLIPIDEMIA: ICD-10-CM

## 2021-03-01 DIAGNOSIS — E78.5 DM TYPE 2 WITH DIABETIC DYSLIPIDEMIA: ICD-10-CM

## 2021-03-01 RX ORDER — SIMVASTATIN 40 MG/1
40 TABLET, FILM COATED ORAL NIGHTLY
Qty: 90 TABLET | Refills: 1 | Status: SHIPPED | OUTPATIENT
Start: 2021-03-01 | End: 2021-11-04

## 2021-03-09 ENCOUNTER — IMMUNIZATION (OUTPATIENT)
Dept: PRIMARY CARE CLINIC | Facility: CLINIC | Age: 81
End: 2021-03-09

## 2021-03-09 DIAGNOSIS — Z23 NEED FOR VACCINATION: Primary | ICD-10-CM

## 2021-03-09 PROCEDURE — 0001A COVID-19, MRNA, LNP-S, PF, 30 MCG/0.3 ML DOSE VACCINE: ICD-10-PCS | Mod: CV19,S$GLB,, | Performed by: INTERNAL MEDICINE

## 2021-03-09 PROCEDURE — 91300 COVID-19, MRNA, LNP-S, PF, 30 MCG/0.3 ML DOSE VACCINE: ICD-10-PCS | Mod: S$GLB,,, | Performed by: INTERNAL MEDICINE

## 2021-03-09 PROCEDURE — 0001A COVID-19, MRNA, LNP-S, PF, 30 MCG/0.3 ML DOSE VACCINE: CPT | Mod: CV19,S$GLB,, | Performed by: INTERNAL MEDICINE

## 2021-03-09 PROCEDURE — 91300 COVID-19, MRNA, LNP-S, PF, 30 MCG/0.3 ML DOSE VACCINE: CPT | Mod: S$GLB,,, | Performed by: INTERNAL MEDICINE

## 2021-03-30 ENCOUNTER — IMMUNIZATION (OUTPATIENT)
Dept: PRIMARY CARE CLINIC | Facility: CLINIC | Age: 81
End: 2021-03-30
Payer: MEDICARE

## 2021-03-30 DIAGNOSIS — Z23 NEED FOR VACCINATION: Primary | ICD-10-CM

## 2021-03-30 PROCEDURE — 0002A COVID-19, MRNA, LNP-S, PF, 30 MCG/0.3 ML DOSE VACCINE: ICD-10-PCS | Mod: CV19,S$GLB,, | Performed by: INTERNAL MEDICINE

## 2021-03-30 PROCEDURE — 91300 COVID-19, MRNA, LNP-S, PF, 30 MCG/0.3 ML DOSE VACCINE: CPT | Mod: S$GLB,,, | Performed by: INTERNAL MEDICINE

## 2021-03-30 PROCEDURE — 91300 COVID-19, MRNA, LNP-S, PF, 30 MCG/0.3 ML DOSE VACCINE: ICD-10-PCS | Mod: S$GLB,,, | Performed by: INTERNAL MEDICINE

## 2021-03-30 PROCEDURE — 0002A COVID-19, MRNA, LNP-S, PF, 30 MCG/0.3 ML DOSE VACCINE: CPT | Mod: CV19,S$GLB,, | Performed by: INTERNAL MEDICINE

## 2021-04-23 ENCOUNTER — OFFICE VISIT (OUTPATIENT)
Dept: HEMATOLOGY/ONCOLOGY | Facility: CLINIC | Age: 81
End: 2021-04-23
Payer: MEDICARE

## 2021-04-23 VITALS
RESPIRATION RATE: 16 BRPM | DIASTOLIC BLOOD PRESSURE: 74 MMHG | BODY MASS INDEX: 27.28 KG/M2 | HEIGHT: 64 IN | OXYGEN SATURATION: 98 % | TEMPERATURE: 99 F | WEIGHT: 159.81 LBS | SYSTOLIC BLOOD PRESSURE: 193 MMHG | HEART RATE: 73 BPM

## 2021-04-23 DIAGNOSIS — E11.22 TYPE 2 DIABETES MELLITUS WITH STAGE 4 CHRONIC KIDNEY DISEASE, WITHOUT LONG-TERM CURRENT USE OF INSULIN: ICD-10-CM

## 2021-04-23 DIAGNOSIS — Z17.0 MALIGNANT NEOPLASM OF LOWER-INNER QUADRANT OF RIGHT BREAST OF FEMALE, ESTROGEN RECEPTOR POSITIVE: Primary | ICD-10-CM

## 2021-04-23 DIAGNOSIS — C50.311 MALIGNANT NEOPLASM OF LOWER-INNER QUADRANT OF RIGHT BREAST OF FEMALE, ESTROGEN RECEPTOR POSITIVE: Primary | ICD-10-CM

## 2021-04-23 DIAGNOSIS — M85.80 OSTEOPENIA, UNSPECIFIED LOCATION: ICD-10-CM

## 2021-04-23 DIAGNOSIS — N18.4 TYPE 2 DIABETES MELLITUS WITH STAGE 4 CHRONIC KIDNEY DISEASE, WITHOUT LONG-TERM CURRENT USE OF INSULIN: ICD-10-CM

## 2021-04-23 DIAGNOSIS — Z79.810 USE OF TAMOXIFEN (NOLVADEX): ICD-10-CM

## 2021-04-23 PROCEDURE — 99999 PR PBB SHADOW E&M-EST. PATIENT-LVL IV: CPT | Mod: PBBFAC,,, | Performed by: NURSE PRACTITIONER

## 2021-04-23 PROCEDURE — 99999 PR PBB SHADOW E&M-EST. PATIENT-LVL IV: ICD-10-PCS | Mod: PBBFAC,,, | Performed by: NURSE PRACTITIONER

## 2021-04-23 PROCEDURE — 99214 OFFICE O/P EST MOD 30 MIN: CPT | Mod: S$PBB,,, | Performed by: NURSE PRACTITIONER

## 2021-04-23 PROCEDURE — 99214 PR OFFICE/OUTPT VISIT, EST, LEVL IV, 30-39 MIN: ICD-10-PCS | Mod: S$PBB,,, | Performed by: NURSE PRACTITIONER

## 2021-04-23 PROCEDURE — 99214 OFFICE O/P EST MOD 30 MIN: CPT | Mod: PBBFAC | Performed by: NURSE PRACTITIONER

## 2021-04-27 ENCOUNTER — LAB VISIT (OUTPATIENT)
Dept: LAB | Facility: HOSPITAL | Age: 81
End: 2021-04-27
Attending: FAMILY MEDICINE
Payer: MEDICARE

## 2021-04-27 ENCOUNTER — OFFICE VISIT (OUTPATIENT)
Dept: FAMILY MEDICINE | Facility: CLINIC | Age: 81
End: 2021-04-27
Payer: MEDICARE

## 2021-04-27 VITALS
HEART RATE: 67 BPM | WEIGHT: 158.31 LBS | HEIGHT: 64 IN | RESPIRATION RATE: 17 BRPM | DIASTOLIC BLOOD PRESSURE: 64 MMHG | SYSTOLIC BLOOD PRESSURE: 120 MMHG | TEMPERATURE: 99 F | OXYGEN SATURATION: 97 % | BODY MASS INDEX: 27.03 KG/M2

## 2021-04-27 DIAGNOSIS — E11.69 DM TYPE 2 WITH DIABETIC DYSLIPIDEMIA: ICD-10-CM

## 2021-04-27 DIAGNOSIS — N18.4 TYPE 2 DIABETES MELLITUS WITH STAGE 4 CHRONIC KIDNEY DISEASE, WITHOUT LONG-TERM CURRENT USE OF INSULIN: ICD-10-CM

## 2021-04-27 DIAGNOSIS — E78.5 DM TYPE 2 WITH DIABETIC DYSLIPIDEMIA: ICD-10-CM

## 2021-04-27 DIAGNOSIS — Z79.810 USE OF TAMOXIFEN (NOLVADEX): ICD-10-CM

## 2021-04-27 DIAGNOSIS — C50.311 MALIGNANT NEOPLASM OF LOWER-INNER QUADRANT OF RIGHT BREAST OF FEMALE, ESTROGEN RECEPTOR POSITIVE: ICD-10-CM

## 2021-04-27 DIAGNOSIS — E11.22 TYPE 2 DIABETES MELLITUS WITH STAGE 4 CHRONIC KIDNEY DISEASE, WITHOUT LONG-TERM CURRENT USE OF INSULIN: ICD-10-CM

## 2021-04-27 DIAGNOSIS — H90.6 MIXED CONDUCTIVE AND SENSORINEURAL HEARING LOSS OF BOTH EARS: ICD-10-CM

## 2021-04-27 DIAGNOSIS — N18.4 ANEMIA DUE TO STAGE 4 CHRONIC KIDNEY DISEASE: ICD-10-CM

## 2021-04-27 DIAGNOSIS — I12.9 HYPERTENSIVE KIDNEY DISEASE WITH CHRONIC KIDNEY DISEASE STAGE IV: ICD-10-CM

## 2021-04-27 DIAGNOSIS — D63.1 ANEMIA DUE TO STAGE 4 CHRONIC KIDNEY DISEASE: ICD-10-CM

## 2021-04-27 DIAGNOSIS — I12.9 HYPERTENSIVE KIDNEY DISEASE WITH CHRONIC KIDNEY DISEASE STAGE IV: Primary | ICD-10-CM

## 2021-04-27 DIAGNOSIS — N18.4 HYPERTENSIVE KIDNEY DISEASE WITH CHRONIC KIDNEY DISEASE STAGE IV: Primary | ICD-10-CM

## 2021-04-27 DIAGNOSIS — Z17.0 MALIGNANT NEOPLASM OF LOWER-INNER QUADRANT OF RIGHT BREAST OF FEMALE, ESTROGEN RECEPTOR POSITIVE: ICD-10-CM

## 2021-04-27 DIAGNOSIS — N18.4 HYPERTENSIVE KIDNEY DISEASE WITH CHRONIC KIDNEY DISEASE STAGE IV: ICD-10-CM

## 2021-04-27 LAB
ESTIMATED AVG GLUCOSE: 180 MG/DL (ref 68–131)
HBA1C MFR BLD: 7.9 % (ref 4–5.6)

## 2021-04-27 PROCEDURE — 99999 PR PBB SHADOW E&M-EST. PATIENT-LVL V: CPT | Mod: PBBFAC,,, | Performed by: FAMILY MEDICINE

## 2021-04-27 PROCEDURE — 99214 OFFICE O/P EST MOD 30 MIN: CPT | Mod: S$PBB,,, | Performed by: FAMILY MEDICINE

## 2021-04-27 PROCEDURE — 99215 OFFICE O/P EST HI 40 MIN: CPT | Mod: PBBFAC,PO | Performed by: FAMILY MEDICINE

## 2021-04-27 PROCEDURE — 99999 PR PBB SHADOW E&M-EST. PATIENT-LVL V: ICD-10-PCS | Mod: PBBFAC,,, | Performed by: FAMILY MEDICINE

## 2021-04-27 PROCEDURE — 80053 COMPREHEN METABOLIC PANEL: CPT | Performed by: FAMILY MEDICINE

## 2021-04-27 PROCEDURE — 36415 COLL VENOUS BLD VENIPUNCTURE: CPT | Mod: PO | Performed by: FAMILY MEDICINE

## 2021-04-27 PROCEDURE — 83036 HEMOGLOBIN GLYCOSYLATED A1C: CPT | Performed by: FAMILY MEDICINE

## 2021-04-27 PROCEDURE — 99214 PR OFFICE/OUTPT VISIT, EST, LEVL IV, 30-39 MIN: ICD-10-PCS | Mod: S$PBB,,, | Performed by: FAMILY MEDICINE

## 2021-04-27 PROCEDURE — 84550 ASSAY OF BLOOD/URIC ACID: CPT | Performed by: FAMILY MEDICINE

## 2021-04-27 PROCEDURE — 82306 VITAMIN D 25 HYDROXY: CPT | Performed by: FAMILY MEDICINE

## 2021-04-28 LAB
25(OH)D3+25(OH)D2 SERPL-MCNC: 55 NG/ML (ref 30–96)
ALBUMIN SERPL BCP-MCNC: 3.6 G/DL (ref 3.5–5.2)
ALP SERPL-CCNC: 69 U/L (ref 55–135)
ALT SERPL W/O P-5'-P-CCNC: 11 U/L (ref 10–44)
ANION GAP SERPL CALC-SCNC: 6 MMOL/L (ref 8–16)
AST SERPL-CCNC: 19 U/L (ref 10–40)
BILIRUB SERPL-MCNC: 0.5 MG/DL (ref 0.1–1)
BUN SERPL-MCNC: 24 MG/DL (ref 8–23)
CALCIUM SERPL-MCNC: 9.5 MG/DL (ref 8.7–10.5)
CHLORIDE SERPL-SCNC: 110 MMOL/L (ref 95–110)
CO2 SERPL-SCNC: 24 MMOL/L (ref 23–29)
CREAT SERPL-MCNC: 1.8 MG/DL (ref 0.5–1.4)
EST. GFR  (AFRICAN AMERICAN): 30 ML/MIN/1.73 M^2
EST. GFR  (NON AFRICAN AMERICAN): 26 ML/MIN/1.73 M^2
GLUCOSE SERPL-MCNC: 116 MG/DL (ref 70–110)
POTASSIUM SERPL-SCNC: 5.1 MMOL/L (ref 3.5–5.1)
PROT SERPL-MCNC: 7.4 G/DL (ref 6–8.4)
SODIUM SERPL-SCNC: 140 MMOL/L (ref 136–145)
URATE SERPL-MCNC: 4.3 MG/DL (ref 2.4–5.7)

## 2021-05-07 ENCOUNTER — OUTPATIENT CASE MANAGEMENT (OUTPATIENT)
Dept: ADMINISTRATIVE | Facility: OTHER | Age: 81
End: 2021-05-07

## 2021-05-27 ENCOUNTER — OUTPATIENT CASE MANAGEMENT (OUTPATIENT)
Dept: ADMINISTRATIVE | Facility: OTHER | Age: 81
End: 2021-05-27

## 2021-06-15 ENCOUNTER — PES CALL (OUTPATIENT)
Dept: ADMINISTRATIVE | Facility: CLINIC | Age: 81
End: 2021-06-15

## 2021-08-05 ENCOUNTER — OFFICE VISIT (OUTPATIENT)
Dept: FAMILY MEDICINE | Facility: CLINIC | Age: 81
End: 2021-08-05
Payer: MEDICARE

## 2021-08-05 ENCOUNTER — LAB VISIT (OUTPATIENT)
Dept: LAB | Facility: HOSPITAL | Age: 81
End: 2021-08-05
Attending: FAMILY MEDICINE
Payer: MEDICARE

## 2021-08-05 VITALS
HEIGHT: 64 IN | HEART RATE: 81 BPM | WEIGHT: 159.63 LBS | SYSTOLIC BLOOD PRESSURE: 128 MMHG | OXYGEN SATURATION: 98 % | BODY MASS INDEX: 27.25 KG/M2 | DIASTOLIC BLOOD PRESSURE: 60 MMHG

## 2021-08-05 DIAGNOSIS — C50.311 MALIGNANT NEOPLASM OF LOWER-INNER QUADRANT OF RIGHT BREAST OF FEMALE, ESTROGEN RECEPTOR POSITIVE: ICD-10-CM

## 2021-08-05 DIAGNOSIS — E11.69 DM TYPE 2 WITH DIABETIC DYSLIPIDEMIA: ICD-10-CM

## 2021-08-05 DIAGNOSIS — N18.4 ANEMIA DUE TO STAGE 4 CHRONIC KIDNEY DISEASE: ICD-10-CM

## 2021-08-05 DIAGNOSIS — N18.4 TYPE 2 DIABETES MELLITUS WITH STAGE 4 CHRONIC KIDNEY DISEASE, WITHOUT LONG-TERM CURRENT USE OF INSULIN: ICD-10-CM

## 2021-08-05 DIAGNOSIS — E11.22 TYPE 2 DIABETES MELLITUS WITH STAGE 4 CHRONIC KIDNEY DISEASE, WITHOUT LONG-TERM CURRENT USE OF INSULIN: ICD-10-CM

## 2021-08-05 DIAGNOSIS — N18.4 HYPERTENSIVE KIDNEY DISEASE WITH CHRONIC KIDNEY DISEASE STAGE IV: Primary | ICD-10-CM

## 2021-08-05 DIAGNOSIS — Z17.0 MALIGNANT NEOPLASM OF LOWER-INNER QUADRANT OF RIGHT BREAST OF FEMALE, ESTROGEN RECEPTOR POSITIVE: ICD-10-CM

## 2021-08-05 DIAGNOSIS — E78.5 DM TYPE 2 WITH DIABETIC DYSLIPIDEMIA: ICD-10-CM

## 2021-08-05 DIAGNOSIS — Z79.810 USE OF TAMOXIFEN (NOLVADEX): ICD-10-CM

## 2021-08-05 DIAGNOSIS — D63.1 ANEMIA DUE TO STAGE 4 CHRONIC KIDNEY DISEASE: ICD-10-CM

## 2021-08-05 DIAGNOSIS — I12.9 HYPERTENSIVE KIDNEY DISEASE WITH CHRONIC KIDNEY DISEASE STAGE IV: Primary | ICD-10-CM

## 2021-08-05 LAB
ALBUMIN SERPL BCP-MCNC: 3.5 G/DL (ref 3.5–5.2)
ALP SERPL-CCNC: 64 U/L (ref 55–135)
ALT SERPL W/O P-5'-P-CCNC: 10 U/L (ref 10–44)
ANION GAP SERPL CALC-SCNC: 8 MMOL/L (ref 8–16)
AST SERPL-CCNC: 16 U/L (ref 10–40)
BILIRUB SERPL-MCNC: 0.5 MG/DL (ref 0.1–1)
BUN SERPL-MCNC: 19 MG/DL (ref 8–23)
CALCIUM SERPL-MCNC: 9.1 MG/DL (ref 8.7–10.5)
CHLORIDE SERPL-SCNC: 107 MMOL/L (ref 95–110)
CO2 SERPL-SCNC: 25 MMOL/L (ref 23–29)
CREAT SERPL-MCNC: 1.8 MG/DL (ref 0.5–1.4)
EST. GFR  (AFRICAN AMERICAN): 30 ML/MIN/1.73 M^2
EST. GFR  (NON AFRICAN AMERICAN): 26 ML/MIN/1.73 M^2
ESTIMATED AVG GLUCOSE: 189 MG/DL (ref 68–131)
GLUCOSE SERPL-MCNC: 232 MG/DL (ref 70–110)
HBA1C MFR BLD: 8.2 % (ref 4–5.6)
POTASSIUM SERPL-SCNC: 4.4 MMOL/L (ref 3.5–5.1)
PROT SERPL-MCNC: 7 G/DL (ref 6–8.4)
SODIUM SERPL-SCNC: 140 MMOL/L (ref 136–145)

## 2021-08-05 PROCEDURE — 99214 OFFICE O/P EST MOD 30 MIN: CPT | Mod: S$PBB,,, | Performed by: FAMILY MEDICINE

## 2021-08-05 PROCEDURE — 99215 OFFICE O/P EST HI 40 MIN: CPT | Mod: PBBFAC,PO | Performed by: FAMILY MEDICINE

## 2021-08-05 PROCEDURE — 80053 COMPREHEN METABOLIC PANEL: CPT | Performed by: FAMILY MEDICINE

## 2021-08-05 PROCEDURE — 99999 PR PBB SHADOW E&M-EST. PATIENT-LVL V: CPT | Mod: PBBFAC,,, | Performed by: FAMILY MEDICINE

## 2021-08-05 PROCEDURE — 83036 HEMOGLOBIN GLYCOSYLATED A1C: CPT | Performed by: FAMILY MEDICINE

## 2021-08-05 PROCEDURE — 99999 PR PBB SHADOW E&M-EST. PATIENT-LVL V: ICD-10-PCS | Mod: PBBFAC,,, | Performed by: FAMILY MEDICINE

## 2021-08-05 PROCEDURE — 99214 PR OFFICE/OUTPT VISIT, EST, LEVL IV, 30-39 MIN: ICD-10-PCS | Mod: S$PBB,,, | Performed by: FAMILY MEDICINE

## 2021-08-05 PROCEDURE — 36415 COLL VENOUS BLD VENIPUNCTURE: CPT | Mod: PO | Performed by: FAMILY MEDICINE

## 2021-08-05 RX ORDER — LATANOPROST 50 UG/ML
1 SOLUTION/ DROPS OPHTHALMIC NIGHTLY
COMMUNITY
Start: 2021-07-28

## 2021-08-06 ENCOUNTER — TELEPHONE (OUTPATIENT)
Dept: ADMINISTRATIVE | Facility: OTHER | Age: 81
End: 2021-08-06

## 2021-08-09 ENCOUNTER — TELEPHONE (OUTPATIENT)
Dept: ADMINISTRATIVE | Facility: OTHER | Age: 81
End: 2021-08-09

## 2021-08-25 ENCOUNTER — PATIENT OUTREACH (OUTPATIENT)
Dept: ADMINISTRATIVE | Facility: OTHER | Age: 81
End: 2021-08-25

## 2021-09-22 ENCOUNTER — CLINICAL SUPPORT (OUTPATIENT)
Dept: DIABETES | Facility: CLINIC | Age: 81
End: 2021-09-22
Payer: MEDICARE

## 2021-09-22 DIAGNOSIS — E11.69 DM TYPE 2 WITH DIABETIC DYSLIPIDEMIA: Primary | ICD-10-CM

## 2021-09-22 DIAGNOSIS — E78.5 DM TYPE 2 WITH DIABETIC DYSLIPIDEMIA: Primary | ICD-10-CM

## 2021-09-22 DIAGNOSIS — E78.5 DM TYPE 2 WITH DIABETIC DYSLIPIDEMIA: ICD-10-CM

## 2021-09-22 DIAGNOSIS — E11.69 DM TYPE 2 WITH DIABETIC DYSLIPIDEMIA: ICD-10-CM

## 2021-09-22 PROCEDURE — G0108 DIAB MANAGE TRN  PER INDIV: HCPCS | Mod: PBBFAC,PO | Performed by: DIETITIAN, REGISTERED

## 2021-09-28 ENCOUNTER — OFFICE VISIT (OUTPATIENT)
Dept: FAMILY MEDICINE | Facility: CLINIC | Age: 81
End: 2021-09-28
Payer: MEDICARE

## 2021-09-28 VITALS
BODY MASS INDEX: 26.83 KG/M2 | DIASTOLIC BLOOD PRESSURE: 70 MMHG | SYSTOLIC BLOOD PRESSURE: 128 MMHG | OXYGEN SATURATION: 96 % | WEIGHT: 156.31 LBS | HEART RATE: 81 BPM

## 2021-09-28 DIAGNOSIS — E11.69 DM TYPE 2 WITH DIABETIC DYSLIPIDEMIA: Primary | ICD-10-CM

## 2021-09-28 DIAGNOSIS — E11.22 TYPE 2 DIABETES MELLITUS WITH STAGE 4 CHRONIC KIDNEY DISEASE, WITHOUT LONG-TERM CURRENT USE OF INSULIN: ICD-10-CM

## 2021-09-28 DIAGNOSIS — E78.5 DM TYPE 2 WITH DIABETIC DYSLIPIDEMIA: Primary | ICD-10-CM

## 2021-09-28 DIAGNOSIS — I12.9 HYPERTENSIVE KIDNEY DISEASE WITH CHRONIC KIDNEY DISEASE STAGE IV: ICD-10-CM

## 2021-09-28 DIAGNOSIS — N18.4 TYPE 2 DIABETES MELLITUS WITH STAGE 4 CHRONIC KIDNEY DISEASE, WITHOUT LONG-TERM CURRENT USE OF INSULIN: ICD-10-CM

## 2021-09-28 DIAGNOSIS — N18.4 HYPERTENSIVE KIDNEY DISEASE WITH CHRONIC KIDNEY DISEASE STAGE IV: ICD-10-CM

## 2021-09-28 PROCEDURE — 99999 PR PBB SHADOW E&M-EST. PATIENT-LVL IV: CPT | Mod: PBBFAC,,, | Performed by: FAMILY MEDICINE

## 2021-09-28 PROCEDURE — 99999 PR PBB SHADOW E&M-EST. PATIENT-LVL IV: ICD-10-PCS | Mod: PBBFAC,,, | Performed by: FAMILY MEDICINE

## 2021-09-28 PROCEDURE — 99214 OFFICE O/P EST MOD 30 MIN: CPT | Mod: S$PBB,,, | Performed by: FAMILY MEDICINE

## 2021-09-28 PROCEDURE — 99214 OFFICE O/P EST MOD 30 MIN: CPT | Mod: PBBFAC,PO | Performed by: FAMILY MEDICINE

## 2021-09-28 PROCEDURE — 99214 PR OFFICE/OUTPT VISIT, EST, LEVL IV, 30-39 MIN: ICD-10-PCS | Mod: S$PBB,,, | Performed by: FAMILY MEDICINE

## 2021-10-12 ENCOUNTER — PATIENT OUTREACH (OUTPATIENT)
Dept: ADMINISTRATIVE | Facility: OTHER | Age: 81
End: 2021-10-12

## 2021-10-13 ENCOUNTER — NUTRITION (OUTPATIENT)
Dept: DIABETES | Facility: CLINIC | Age: 81
End: 2021-10-13
Payer: MEDICARE

## 2021-10-13 DIAGNOSIS — E11.22 TYPE 2 DIABETES MELLITUS WITH STAGE 4 CHRONIC KIDNEY DISEASE, WITHOUT LONG-TERM CURRENT USE OF INSULIN: ICD-10-CM

## 2021-10-13 DIAGNOSIS — N18.4 TYPE 2 DIABETES MELLITUS WITH STAGE 4 CHRONIC KIDNEY DISEASE, WITHOUT LONG-TERM CURRENT USE OF INSULIN: ICD-10-CM

## 2021-10-13 PROCEDURE — G0108 DIAB MANAGE TRN  PER INDIV: HCPCS | Mod: PBBFAC,PO | Performed by: DIETITIAN, REGISTERED

## 2021-10-14 ENCOUNTER — IMMUNIZATION (OUTPATIENT)
Dept: INTERNAL MEDICINE | Facility: CLINIC | Age: 81
End: 2021-10-14
Payer: MEDICARE

## 2021-10-14 DIAGNOSIS — Z23 NEED FOR VACCINATION: Primary | ICD-10-CM

## 2021-10-14 PROCEDURE — 0003A COVID-19, MRNA, LNP-S, PF, 30 MCG/0.3 ML DOSE VACCINE: CPT | Mod: PBBFAC | Performed by: INTERNAL MEDICINE

## 2021-10-14 PROCEDURE — 91300 COVID-19, MRNA, LNP-S, PF, 30 MCG/0.3 ML DOSE VACCINE: CPT | Mod: PBBFAC,PO

## 2021-11-04 ENCOUNTER — LAB VISIT (OUTPATIENT)
Dept: LAB | Facility: HOSPITAL | Age: 81
End: 2021-11-04
Attending: FAMILY MEDICINE
Payer: MEDICARE

## 2021-11-04 ENCOUNTER — OFFICE VISIT (OUTPATIENT)
Dept: FAMILY MEDICINE | Facility: CLINIC | Age: 81
End: 2021-11-04
Payer: MEDICARE

## 2021-11-04 VITALS
HEART RATE: 86 BPM | HEIGHT: 64 IN | WEIGHT: 156.5 LBS | DIASTOLIC BLOOD PRESSURE: 70 MMHG | OXYGEN SATURATION: 97 % | SYSTOLIC BLOOD PRESSURE: 124 MMHG | BODY MASS INDEX: 26.72 KG/M2

## 2021-11-04 DIAGNOSIS — N18.4 HYPERTENSIVE KIDNEY DISEASE WITH CHRONIC KIDNEY DISEASE STAGE IV: ICD-10-CM

## 2021-11-04 DIAGNOSIS — I12.9 HYPERTENSIVE KIDNEY DISEASE WITH CHRONIC KIDNEY DISEASE STAGE IV: ICD-10-CM

## 2021-11-04 PROCEDURE — 80053 COMPREHEN METABOLIC PANEL: CPT | Performed by: FAMILY MEDICINE

## 2021-11-04 PROCEDURE — 36415 COLL VENOUS BLD VENIPUNCTURE: CPT | Mod: PO | Performed by: FAMILY MEDICINE

## 2021-11-04 PROCEDURE — 99214 OFFICE O/P EST MOD 30 MIN: CPT | Mod: PBBFAC,PO | Performed by: FAMILY MEDICINE

## 2021-11-04 PROCEDURE — 99214 OFFICE O/P EST MOD 30 MIN: CPT | Mod: S$PBB,,, | Performed by: FAMILY MEDICINE

## 2021-11-04 PROCEDURE — 99999 PR PBB SHADOW E&M-EST. PATIENT-LVL IV: CPT | Mod: PBBFAC,,, | Performed by: FAMILY MEDICINE

## 2021-11-04 PROCEDURE — 99214 PR OFFICE/OUTPT VISIT, EST, LEVL IV, 30-39 MIN: ICD-10-PCS | Mod: S$PBB,,, | Performed by: FAMILY MEDICINE

## 2021-11-04 PROCEDURE — 99999 PR PBB SHADOW E&M-EST. PATIENT-LVL IV: ICD-10-PCS | Mod: PBBFAC,,, | Performed by: FAMILY MEDICINE

## 2021-11-04 PROCEDURE — 83036 HEMOGLOBIN GLYCOSYLATED A1C: CPT | Performed by: FAMILY MEDICINE

## 2021-11-05 LAB
ALBUMIN SERPL BCP-MCNC: 3.6 G/DL (ref 3.5–5.2)
ALP SERPL-CCNC: 60 U/L (ref 55–135)
ALT SERPL W/O P-5'-P-CCNC: 12 U/L (ref 10–44)
ANION GAP SERPL CALC-SCNC: 10 MMOL/L (ref 8–16)
AST SERPL-CCNC: 21 U/L (ref 10–40)
BILIRUB SERPL-MCNC: 0.5 MG/DL (ref 0.1–1)
BUN SERPL-MCNC: 22 MG/DL (ref 8–23)
CALCIUM SERPL-MCNC: 9.4 MG/DL (ref 8.7–10.5)
CHLORIDE SERPL-SCNC: 105 MMOL/L (ref 95–110)
CO2 SERPL-SCNC: 22 MMOL/L (ref 23–29)
CREAT SERPL-MCNC: 1.6 MG/DL (ref 0.5–1.4)
EST. GFR  (AFRICAN AMERICAN): 34.3 ML/MIN/1.73 M^2
EST. GFR  (NON AFRICAN AMERICAN): 29.8 ML/MIN/1.73 M^2
ESTIMATED AVG GLUCOSE: 171 MG/DL (ref 68–131)
GLUCOSE SERPL-MCNC: 253 MG/DL (ref 70–110)
HBA1C MFR BLD: 7.6 % (ref 4–5.6)
POTASSIUM SERPL-SCNC: 4.9 MMOL/L (ref 3.5–5.1)
PROT SERPL-MCNC: 6.9 G/DL (ref 6–8.4)
SODIUM SERPL-SCNC: 137 MMOL/L (ref 136–145)

## 2021-11-12 DIAGNOSIS — I12.9 HYPERTENSIVE KIDNEY DISEASE WITH CHRONIC KIDNEY DISEASE STAGE IV: ICD-10-CM

## 2021-11-12 DIAGNOSIS — N18.4 HYPERTENSIVE KIDNEY DISEASE WITH CHRONIC KIDNEY DISEASE STAGE IV: ICD-10-CM

## 2021-11-12 RX ORDER — LOSARTAN POTASSIUM 25 MG/1
TABLET ORAL
Qty: 90 TABLET | Refills: 0 | Status: SHIPPED | OUTPATIENT
Start: 2021-11-12 | End: 2022-01-03

## 2022-01-20 ENCOUNTER — TELEPHONE (OUTPATIENT)
Dept: FAMILY MEDICINE | Facility: CLINIC | Age: 82
End: 2022-01-20
Payer: COMMERCIAL

## 2022-01-21 ENCOUNTER — TELEPHONE (OUTPATIENT)
Dept: FAMILY MEDICINE | Facility: CLINIC | Age: 82
End: 2022-01-21
Payer: COMMERCIAL

## 2022-02-14 ENCOUNTER — TELEPHONE (OUTPATIENT)
Dept: FAMILY MEDICINE | Facility: CLINIC | Age: 82
End: 2022-02-14
Payer: COMMERCIAL

## 2022-02-15 ENCOUNTER — LAB VISIT (OUTPATIENT)
Dept: LAB | Facility: HOSPITAL | Age: 82
End: 2022-02-15
Attending: FAMILY MEDICINE
Payer: MEDICARE

## 2022-02-15 ENCOUNTER — OFFICE VISIT (OUTPATIENT)
Dept: FAMILY MEDICINE | Facility: CLINIC | Age: 82
End: 2022-02-15
Payer: MEDICARE

## 2022-02-15 ENCOUNTER — TELEPHONE (OUTPATIENT)
Dept: FAMILY MEDICINE | Facility: CLINIC | Age: 82
End: 2022-02-15
Payer: COMMERCIAL

## 2022-02-15 VITALS
WEIGHT: 151 LBS | HEIGHT: 64 IN | DIASTOLIC BLOOD PRESSURE: 72 MMHG | SYSTOLIC BLOOD PRESSURE: 122 MMHG | OXYGEN SATURATION: 97 % | HEART RATE: 93 BPM | RESPIRATION RATE: 14 BRPM | BODY MASS INDEX: 25.78 KG/M2

## 2022-02-15 DIAGNOSIS — I12.9 HYPERTENSIVE KIDNEY DISEASE WITH CHRONIC KIDNEY DISEASE STAGE IV: ICD-10-CM

## 2022-02-15 DIAGNOSIS — D63.1 ANEMIA DUE TO STAGE 4 CHRONIC KIDNEY DISEASE: ICD-10-CM

## 2022-02-15 DIAGNOSIS — H90.6 MIXED CONDUCTIVE AND SENSORINEURAL HEARING LOSS OF BOTH EARS: ICD-10-CM

## 2022-02-15 DIAGNOSIS — E11.22 TYPE 2 DIABETES MELLITUS WITH STAGE 4 CHRONIC KIDNEY DISEASE, WITHOUT LONG-TERM CURRENT USE OF INSULIN: Primary | ICD-10-CM

## 2022-02-15 DIAGNOSIS — E11.69 DM TYPE 2 WITH DIABETIC DYSLIPIDEMIA: ICD-10-CM

## 2022-02-15 DIAGNOSIS — N18.4 TYPE 2 DIABETES MELLITUS WITH STAGE 4 CHRONIC KIDNEY DISEASE, WITHOUT LONG-TERM CURRENT USE OF INSULIN: Primary | ICD-10-CM

## 2022-02-15 DIAGNOSIS — N18.4 ANEMIA DUE TO STAGE 4 CHRONIC KIDNEY DISEASE: ICD-10-CM

## 2022-02-15 DIAGNOSIS — C50.311 MALIGNANT NEOPLASM OF LOWER-INNER QUADRANT OF RIGHT BREAST OF FEMALE, ESTROGEN RECEPTOR POSITIVE: ICD-10-CM

## 2022-02-15 DIAGNOSIS — F32.1 CURRENT MODERATE EPISODE OF MAJOR DEPRESSIVE DISORDER, UNSPECIFIED WHETHER RECURRENT: ICD-10-CM

## 2022-02-15 DIAGNOSIS — N18.4 HYPERTENSIVE KIDNEY DISEASE WITH CHRONIC KIDNEY DISEASE STAGE IV: ICD-10-CM

## 2022-02-15 DIAGNOSIS — Z79.810 USE OF TAMOXIFEN (NOLVADEX): ICD-10-CM

## 2022-02-15 DIAGNOSIS — Z17.0 MALIGNANT NEOPLASM OF LOWER-INNER QUADRANT OF RIGHT BREAST OF FEMALE, ESTROGEN RECEPTOR POSITIVE: ICD-10-CM

## 2022-02-15 DIAGNOSIS — E78.5 DM TYPE 2 WITH DIABETIC DYSLIPIDEMIA: ICD-10-CM

## 2022-02-15 LAB
ALBUMIN SERPL BCP-MCNC: 3.5 G/DL (ref 3.5–5.2)
ALP SERPL-CCNC: 60 U/L (ref 55–135)
ALT SERPL W/O P-5'-P-CCNC: 11 U/L (ref 10–44)
ANION GAP SERPL CALC-SCNC: 8 MMOL/L (ref 8–16)
AST SERPL-CCNC: 21 U/L (ref 10–40)
BILIRUB SERPL-MCNC: 0.6 MG/DL (ref 0.1–1)
BUN SERPL-MCNC: 20 MG/DL (ref 8–23)
CALCIUM SERPL-MCNC: 9.6 MG/DL (ref 8.7–10.5)
CHLORIDE SERPL-SCNC: 104 MMOL/L (ref 95–110)
CHOLEST SERPL-MCNC: 158 MG/DL (ref 120–199)
CHOLEST/HDLC SERPL: 2.3 {RATIO} (ref 2–5)
CO2 SERPL-SCNC: 27 MMOL/L (ref 23–29)
CREAT SERPL-MCNC: 1.9 MG/DL (ref 0.5–1.4)
EST. GFR  (AFRICAN AMERICAN): 27.9 ML/MIN/1.73 M^2
EST. GFR  (NON AFRICAN AMERICAN): 24.2 ML/MIN/1.73 M^2
ESTIMATED AVG GLUCOSE: 169 MG/DL (ref 68–131)
GLUCOSE SERPL-MCNC: 186 MG/DL (ref 70–110)
HBA1C MFR BLD: 7.5 % (ref 4–5.6)
HDLC SERPL-MCNC: 69 MG/DL (ref 40–75)
HDLC SERPL: 43.7 % (ref 20–50)
LDLC SERPL CALC-MCNC: 70.2 MG/DL (ref 63–159)
NONHDLC SERPL-MCNC: 89 MG/DL
POTASSIUM SERPL-SCNC: 4.9 MMOL/L (ref 3.5–5.1)
PROT SERPL-MCNC: 7.4 G/DL (ref 6–8.4)
SODIUM SERPL-SCNC: 139 MMOL/L (ref 136–145)
TRIGL SERPL-MCNC: 94 MG/DL (ref 30–150)

## 2022-02-15 PROCEDURE — 80053 COMPREHEN METABOLIC PANEL: CPT | Performed by: FAMILY MEDICINE

## 2022-02-15 PROCEDURE — 99214 PR OFFICE/OUTPT VISIT, EST, LEVL IV, 30-39 MIN: ICD-10-PCS | Mod: S$PBB,,, | Performed by: FAMILY MEDICINE

## 2022-02-15 PROCEDURE — 36415 COLL VENOUS BLD VENIPUNCTURE: CPT | Mod: PO | Performed by: FAMILY MEDICINE

## 2022-02-15 PROCEDURE — 99214 OFFICE O/P EST MOD 30 MIN: CPT | Mod: S$PBB,,, | Performed by: FAMILY MEDICINE

## 2022-02-15 PROCEDURE — 83036 HEMOGLOBIN GLYCOSYLATED A1C: CPT | Performed by: FAMILY MEDICINE

## 2022-02-15 PROCEDURE — 80061 LIPID PANEL: CPT | Performed by: FAMILY MEDICINE

## 2022-02-15 PROCEDURE — 99999 PR PBB SHADOW E&M-EST. PATIENT-LVL IV: CPT | Mod: PBBFAC,,, | Performed by: FAMILY MEDICINE

## 2022-02-15 PROCEDURE — 99214 OFFICE O/P EST MOD 30 MIN: CPT | Mod: PBBFAC,PO | Performed by: FAMILY MEDICINE

## 2022-02-15 PROCEDURE — 99999 PR PBB SHADOW E&M-EST. PATIENT-LVL IV: ICD-10-PCS | Mod: PBBFAC,,, | Performed by: FAMILY MEDICINE

## 2022-02-15 RX ORDER — DAPAGLIFLOZIN 10 MG/1
10 TABLET, FILM COATED ORAL DAILY
Qty: 90 TABLET | Refills: 1 | Status: SHIPPED | OUTPATIENT
Start: 2022-02-15 | End: 2022-07-19

## 2022-02-15 RX ORDER — DAPAGLIFLOZIN 5 MG/1
5 TABLET, FILM COATED ORAL DAILY
Qty: 90 TABLET | Refills: 1 | Status: SHIPPED | OUTPATIENT
Start: 2022-02-15 | End: 2022-02-15 | Stop reason: DRUGHIGH

## 2022-02-15 NOTE — TELEPHONE ENCOUNTER
----- Message from Kaela Ceballos sent at 2/15/2022  3:22 PM CST -----  Contact: pt  Type:  Needs Medical Advice    Who Called: pt  Symptoms (please be specific):    How long has patient had these symptoms:    Pharmacy name and phone #:    Would the patient rather a call back or a response via [facility]? call  Best Call Back Number: [phone] (H)   Additional Information: pt picked up prescription but only got 30 not 90       dapagliflozin (FARXIGA) 5 mg Tab tablet 90 tablet 1 2/15/2022  --  Sig - Route: Take 1 tablet (5 mg total) by mouth once daily. - Oral  Sent to pharmacy as: dapagliflozin (FARXIGA) 5 mg Tab tablet  Class: Normal  Order: [id number]  Date/Time Signed: 2/15/2022 14:19

## 2022-02-15 NOTE — ADDENDUM NOTE
Addended by: MICHAEL SANTACRUZ on: 2/15/2022 04:02 PM     Modules accepted: Orders     None   Occupational History    None   Social Needs    Financial resource strain: None    Food insecurity:     Worry: None     Inability: None    Transportation needs:     Medical: None     Non-medical: None   Tobacco Use    Smoking status: Current Every Day Smoker     Packs/day: 1.00     Years: 20.00     Pack years: 20.00     Types: Cigarettes    Smokeless tobacco: Never Used   Substance and Sexual Activity    Alcohol use: No    Drug use: Yes     Types: Methamphetamines     Comment: clean for 11 years, past hx of crack use    Sexual activity: Yes     Partners: Male   Lifestyle    Physical activity:     Days per week: None     Minutes per session: None    Stress: None   Relationships    Social connections:     Talks on phone: None     Gets together: None     Attends [employer] service: None     Active member of club or organization: None     Attends meetings of clubs or organizations: None     Relationship status: None    Intimate partner violence:     Fear of current or ex partner: None     Emotionally abused: None     Physically abused: None     Forced sexual activity: None   Other Topics Concern    None   Social History Narrative    None       SCREENINGS    Jones Coma Scale  Eye Opening: Spontaneous  Best Verbal Response: Oriented  Best Motor Response: Obeys commands  Jones Coma Scale Score: 15          PHYSICAL EXAM    (up to 7 for level 4, 8 or more for level 5)     ED Triage Vitals [01/09/20 1017]   BP Temp Temp Source Pulse Resp SpO2 Height Weight   102/64 98.6 °F (37 °C) Oral 87 18 96 % 5' 4\" (1.626 m) 110 lb (49.9 kg)       Physical Exam  Vitals signs and nursing note reviewed. Exam conducted with a chaperone present. Constitutional:       General: She is not in acute distress. Appearance: Normal appearance. She is well-developed. She is not ill-appearing, toxic-appearing or diaphoretic. Comments: No acute distress nontoxic   HENT:      Head: Normocephalic and atraumatic.

## 2022-02-15 NOTE — TELEPHONE ENCOUNTER
Spoke with pt who notes that Farxiga 5 mg instead of 10 mg was sent to the [facility] instead of 10 mg tablets. Apologized to the patient for my mistake. Will sedfn Farxiga 10 mg tablets to [facility] on [address]. Pt advised to take two 5 mg tablets daily until rx is completed.

## 2022-02-15 NOTE — PROGRESS NOTES
Subjective:       Patient ID: Whit Sloan is a 81 y.o. female.    Chief Complaint: Diabetes, Hypertension, and Chronic Kidney Disease  82 yo female with HTN, CKD, Stage 4, bilateral hearing loss, DM, Type 2, hyperlipidemia, right breast CA on Tamoxifen presents for f/u of her chronic conditions.  Pt is followed by Hem/Onc for breast CA. Is on Tamoxifen.  Diabetes  She presents for her follow-up diabetic visit. She has type 2 diabetes mellitus. Pertinent negatives for hypoglycemia include no headaches. Pertinent negatives for diabetes include no blurred vision, no chest pain, no polydipsia, no polyphagia and no polyuria. An ACE inhibitor/angiotensin II receptor blocker is being taken. Eye exam is current.   Hypertension  This is a chronic problem. The current episode started more than 1 year ago. The problem is unchanged. The problem is controlled. Pertinent negatives include no blurred vision, chest pain, headaches, orthopnea, palpitations, peripheral edema, PND or shortness of breath.   Pt is followed by Dr. Angie Sprague for CKD. Dr. Sprague has recommended Farxiga for patient.  Depression is stable.  Review of Systems   Constitutional: Negative for chills and fever.   HENT: Positive for hearing loss.    Eyes: Negative for blurred vision.   Respiratory: Negative for shortness of breath.    Cardiovascular: Negative for chest pain, palpitations, orthopnea, leg swelling and PND.   Gastrointestinal: Negative for anal bleeding and blood in stool.   Endocrine: Negative for polydipsia, polyphagia and polyuria.   Genitourinary: Negative for dysuria and hematuria.   Neurological: Negative for headaches.       Objective:      Physical Exam  Vitals reviewed.   Constitutional:       General: She is not in acute distress.     Appearance: Normal appearance. She is not ill-appearing.   HENT:      Head: Normocephalic and atraumatic.      Right Ear: External ear normal.      Left Ear: External ear normal.   Eyes:      General: No  scleral icterus.        Right eye: No discharge.         Left eye: No discharge.      Extraocular Movements: Extraocular movements intact.      Conjunctiva/sclera: Conjunctivae normal.   Neck:      Vascular: No carotid bruit.   Cardiovascular:      Rate and Rhythm: Normal rate and regular rhythm.      Pulses: Normal pulses.      Heart sounds: Normal heart sounds. No murmur heard.  No gallop.    Pulmonary:      Effort: Pulmonary effort is normal.      Breath sounds: Normal breath sounds. No wheezing or rales.   Abdominal:      General: Bowel sounds are normal.      Palpations: Abdomen is soft. There is no mass.      Tenderness: There is no abdominal tenderness.   Musculoskeletal:      Cervical back: Normal range of motion and neck supple. No rigidity or tenderness.      Right lower leg: No edema.      Left lower leg: No edema.   Skin:     General: Skin is warm and dry.   Neurological:      Mental Status: She is alert.   Psychiatric:         Mood and Affect: Mood normal.         Assessment:       See plan  Plan:       Type 2 diabetes mellitus with stage 4 chronic kidney disease, without long-term current use of insulin  -     Discontinue: dapagliflozin (FARXIGA) 5 mg Tab tablet; Take 1 tablet (5 mg total) by mouth once daily.  Dispense: 90 tablet; Refill: 1  -  Farxiga 5 mg daily sent incorrectly. Will change to Farxiga 10 mg daily.    Hypertensive kidney disease with chronic kidney disease stage IV: Stable  -Continue f/u with Dr. 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

## 2022-03-07 ENCOUNTER — TELEPHONE (OUTPATIENT)
Dept: INTERNAL MEDICINE | Facility: CLINIC | Age: 82
End: 2022-03-07
Payer: COMMERCIAL

## 2022-03-07 DIAGNOSIS — E78.5 DM TYPE 2 WITH DIABETIC DYSLIPIDEMIA: ICD-10-CM

## 2022-03-07 DIAGNOSIS — E11.69 DM TYPE 2 WITH DIABETIC DYSLIPIDEMIA: ICD-10-CM

## 2022-03-07 RX ORDER — GLIPIZIDE 10 MG/1
10 TABLET ORAL
Qty: 180 TABLET | Refills: 1 | Status: SHIPPED | OUTPATIENT
Start: 2022-03-07 | End: 2022-09-11

## 2022-03-07 NOTE — TELEPHONE ENCOUNTER
Pt now rescheduled per daughter ----- Message from Ileana Sorensen sent at 3/7/2022  9:42 AM CST -----  Regarding: Returning a call  Contact: [phone]/ortiz Jessica  Type:  Patient Returning Call    Who Called: daughter Lisa  Who Left Message for Patient: Shannan Silva MA   Does the patient know what this is regarding?: her appointment.   Would the patient rather a call back or a response via [facility]?  Call her   Best Call Back Number: [phone]/ortiz Jessica  Additional Information:

## 2022-03-07 NOTE — TELEPHONE ENCOUNTER
No new care gaps identified.  Powered by [employer]. Reference number: [id number].   3/07/2022 12:30:41 AM CST

## 2022-03-14 ENCOUNTER — PES CALL (OUTPATIENT)
Dept: ADMINISTRATIVE | Facility: CLINIC | Age: 82
End: 2022-03-14
Payer: COMMERCIAL

## 2022-03-18 ENCOUNTER — DOCUMENTATION ONLY (OUTPATIENT)
Dept: FAMILY MEDICINE | Facility: CLINIC | Age: 82
End: 2022-03-18
Payer: COMMERCIAL

## 2022-03-18 NOTE — PROGRESS NOTES
Nephrology appointment with Dr. Angie Sprague on 3/9/22 for f/u of CKD, Stage 4. Pt to f/u in 2 months with labs.

## 2022-03-25 NOTE — TELEPHONE ENCOUNTER
----- Message from Latasha Hernandez sent at 11/7/2019  2:03 PM CST -----  Contact: PT  Pt would like to be called back regarding a appt for flu shot    Pt can be reached at [phone]   Internal Medicine Progress Note  Patient: Praveen Hill  Age/sex: 69 y o  male  Medical Record #: [id number]      ASSESSMENT/PLAN: (Interval History)  Praveen Hill is seen and examined and management for following issues:    S/p anterior cervical discectomy and fixation fusion C5-6 and C6-7, posterior cervical decompression and instrumented fusion C3-T1  · Aspen collar at all times  · S/p Decadron   · Pain control and therapy per PMR  · Pt continues to have bleeding from the distal portion of the posterior incision with bruising  · Pt with areas of skin breakdown at the staples at the distal portion of the incision  Possibly due to moisture  NS following  · Keflex started 3/24  · Consult wound nurse for further recommendations  · Cont coumadin 2/2 Factor V     PAF  · INR 2 64  · Pt takes Coumadin 5mg Wed and Sat and 2 5mg Mon, Tues, Thurs, Fri and Sun  · Will decrease to 5mg on Wednesdays only and 2 5 mg all other days to have his INR closer to 2 0  · INR has remained therapeutic     HTN  · Has been elevated during hospitalization possible due to steroids  · Continue amlodipine 5mg 2x daily,  Toprol XL 100mg daily  · BP starting to improve likely d/t steroids being completed  · Dc hydralazine 3/25      Leiden Factor V  · Coumadin as above  Hyponatremia  · Na improving - 134  · Will continue to monitor  DC planning: TBD  The above assessment and plan was reviewed and updated as determined by my evaluation of the patient on 3/25/2022      Labs:   Results from last 7 days   Lab Units 03/24/22  0706 03/21/22  0614   WBC Thousand/uL 8 61 9 22   HEMOGLOBIN g/dL 11 0* 12 1   HEMATOCRIT % 30 8* 33 7*   PLATELETS Thousands/uL 266 245     Results from last 7 days   Lab Units 03/24/22  0706 03/21/22  0614   SODIUM mmol/L 134* 132*   POTASSIUM mmol/L 3 6 3 7   CHLORIDE mmol/L 99* 97*   CO2 mmol/L 30 27   BUN mg/dL 14 13   CREATININE mg/dL 0 65 0 60   CALCIUM mg/dL 9 3 9 3         Results from last 7 days   Lab Units 03/24/22  0706 03/22/22  0603   INR  2 64* 2 68*     Results from last 7 days   Lab Units 03/25/22  0633 03/21/22  0926 03/21/22  0625   POC GLUCOSE mg/dl 130 171* 108       Review of Scheduled Meds:  Current Facility-Administered Medications   Medication Dose Route Frequency Provider Last Rate    acetaminophen  975 mg Oral [facility] Luma Moreno MD      amLODIPine  5 mg Oral BID Luma Moreno MD      atorvastatin  40 mg Oral QPM Luma Moreno MD      bisacodyl  10 mg Rectal Daily PRN Luma Moreno MD      calcium carbonate  1,000 mg Oral Daily PRN Luma Moreno MD      cephalexin  500 mg Oral Q6H [facility] Luma Moreno MD      Diclofenac Sodium  2 g Topical 4x Daily PRN Luma Moreno MD      docusate sodium  100 mg Oral BID Luma Moreno MD      DULoxetine  60 mg Oral HS Luma Moreno MD      flecainide  100 mg Oral BID Luma Moreno MD      gabapentin  100 mg Oral HS Luma Moreno MD      hydrALAZINE  50 mg Oral [facility] DEEPTHI Barrett      lidocaine   Topical 4x Daily PRN Luma Moreno MD      methocarbamol  500 mg Oral Q6H PRN Luma Moreno MD      metoprolol succinate  100 mg Oral Daily Luma Moreno MD      oxyCODONE  2 5 mg Oral Q4H PRN Luma Moreno MD      oxyCODONE  5 mg Oral Q4H PRN Luma Moreno MD      pantoprazole  40 mg Oral Early Morning Luma Moreno MD      polyethylene glycol  17 g Oral Daily PRN Luma Moreno MD      senna  1 tablet Oral Daily Luma Moreno MD      tamsulosin  0 4 mg Oral Daily With Juany Roa MD      warfarin  2 5 mg Oral Once per day on Sun Mon Tue Thu Fri Henna Hsieh PA-C      warfarin  5 mg Oral Once per day on Wed Sat Henna Hsieh PA-C         Subjective/ HPI: Patient seen and examined  Patients overnight issues or events were reviewed with nursing or staff during rounds or morning huddle session  New or overnight issues include the following:     Pt seen in his room during dressing change   Bottom portion continues to ooze and have erythema and maceration noted  ROS:   A 10 point ROS was performed; negative except as noted above  Imaging:     No orders to display       *Labs /Radiology studies Reviewed  *Medications  reviewed and reconciled as needed  *Please refer to order section for additional ordered labs studies  *Case discussed with primary attending during morning huddle case rounds    Physical Examination:  Vitals:   Vitals:    03/24/22 1031 03/24/22 1500 03/24/22 2108 03/25/22 0602   BP: 151/71 147/68 125/67 113/53   BP Location:  Right arm Left arm Left arm   Pulse: 77 68 66 70   Resp:  17 18 16   Temp:  98 1 °F (36 7 °C) 98 4 °F (36 9 °C) 98 7 °F (37 1 °C)   TempSrc:  Oral Oral Oral   SpO2:  94% 97% 94%   Weight:    104 kg (230 lb 6 1 oz)   Height:         GEN: No apparent distress, interactive  NEURO: Alert and oriented x3  HEENT: Pupils are equal and reactive, EOMI, mucous membranes are moist, face symmetrical; aspen collar in place  CV: S1 S2 regular, no MRG, no peripheral edema noted  RESP: Lungs are clear bilaterally, no wheezes, rales or rhonchi noted, on room air, respirations easy and non labored  GI: Flat, soft non tender, non distended; +BS x4  : Voiding without difficulty  MUSC: Moves all extremities  SKIN: pink, warm and dry, normal turgor, incision unchanged, refer to media images        The above physical exam was reviewed and updated as determined by my evaluation of the patient on 3/25/2022  Invasive Devices  Report    None                    VTE Pharmacologic Prophylaxis: Warfarin (Coumadin)  Code Status: Level 3 - DNAR and DNI  Current Length of Stay: 7 day(s)      Total time spent:  30 minutes with more than 50% spent counseling/coordinating care  Counseling includes discussion with patient re: progress  and discussion with patient of his/her current medical state/information  Coordination of patient's care was performed in conjunction with primary service   Time invested included review of patient's labs, vitals, and management of their comorbidities with continued monitoring  In addition, this patient was discussed with medical team including physician and advanced extenders  The care of the patient was extensively discussed and appropriate treatment plan was formulated unique for this patient  ** Please Note:  voice to text software may have been used in the creation of this document   Although proof errors in transcription or interpretation are a potential of such software**

## 2022-04-14 ENCOUNTER — CLINICAL SUPPORT (OUTPATIENT)
Dept: AUDIOLOGY | Facility: CLINIC | Age: 82
End: 2022-04-14
Payer: MEDICARE

## 2022-04-14 ENCOUNTER — OFFICE VISIT (OUTPATIENT)
Dept: OTOLARYNGOLOGY | Facility: CLINIC | Age: 82
End: 2022-04-14
Payer: MEDICARE

## 2022-04-14 VITALS — BODY MASS INDEX: 25.92 KG/M2 | WEIGHT: 151 LBS

## 2022-04-14 DIAGNOSIS — H90.3 SENSORINEURAL HEARING LOSS, BILATERAL: Primary | ICD-10-CM

## 2022-04-14 DIAGNOSIS — H90.3 SENSORINEURAL HEARING LOSS (SNHL), BILATERAL: Primary | ICD-10-CM

## 2022-04-14 DIAGNOSIS — T16.1XXA FOREIGN BODY OF RIGHT EAR, INITIAL ENCOUNTER: Primary | ICD-10-CM

## 2022-04-14 PROCEDURE — 69200 PR REMV EXT CANAL FOREIGN BODY: ICD-10-PCS | Mod: S$PBB,RT,, | Performed by: OTOLARYNGOLOGY

## 2022-04-14 PROCEDURE — 99999 PR PBB SHADOW E&M-EST. PATIENT-LVL I: CPT | Mod: PBBFAC,,,

## 2022-04-14 PROCEDURE — 99499 NO LOS: ICD-10-PCS | Mod: S$PBB,,, | Performed by: AUDIOLOGIST

## 2022-04-14 PROCEDURE — 99211 OFF/OP EST MAY X REQ PHY/QHP: CPT | Mod: PBBFAC,27

## 2022-04-14 PROCEDURE — 99999 PR PBB SHADOW E&M-EST. PATIENT-LVL I: ICD-10-PCS | Mod: PBBFAC,,,

## 2022-04-14 PROCEDURE — 92567 TYMPANOMETRY: CPT | Mod: PBBFAC

## 2022-04-14 PROCEDURE — 99999 PR PBB SHADOW E&M-EST. PATIENT-LVL III: CPT | Mod: PBBFAC,,, | Performed by: OTOLARYNGOLOGY

## 2022-04-14 PROCEDURE — 92557 COMPREHENSIVE HEARING TEST: CPT | Mod: PBBFAC

## 2022-04-14 PROCEDURE — 69200 CLEAR OUTER EAR CANAL: CPT | Mod: S$PBB,RT,, | Performed by: OTOLARYNGOLOGY

## 2022-04-14 PROCEDURE — 99213 OFFICE O/P EST LOW 20 MIN: CPT | Mod: PBBFAC | Performed by: OTOLARYNGOLOGY

## 2022-04-14 PROCEDURE — 99999 PR PBB SHADOW E&M-EST. PATIENT-LVL III: ICD-10-PCS | Mod: PBBFAC,,, | Performed by: OTOLARYNGOLOGY

## 2022-04-14 PROCEDURE — 99213 PR OFFICE/OUTPT VISIT, EST, LEVL III, 20-29 MIN: ICD-10-PCS | Mod: S$PBB,25,, | Performed by: OTOLARYNGOLOGY

## 2022-04-14 PROCEDURE — 69200 CLEAR OUTER EAR CANAL: CPT | Mod: PBBFAC | Performed by: OTOLARYNGOLOGY

## 2022-04-14 PROCEDURE — 99213 OFFICE O/P EST LOW 20 MIN: CPT | Mod: S$PBB,25,, | Performed by: OTOLARYNGOLOGY

## 2022-04-14 PROCEDURE — 99499 UNLISTED E&M SERVICE: CPT | Mod: S$PBB,,, | Performed by: AUDIOLOGIST

## 2022-04-14 RX ORDER — PATIROMER 8.4 G/1
8.4 POWDER, FOR SUSPENSION ORAL DAILY
COMMUNITY
Start: 2022-03-21 | End: 2022-11-21

## 2022-04-14 NOTE — PROGRESS NOTES
Subjective:       Patient ID: Whit Sloan is a 81 y.o. female.    Chief Complaint: Sinus Problem    Sinus Problem      Review of Systems    Objective:      Physical Exam    Assessment:       1. Foreign body of right ear, initial encounter        Plan:

## 2022-04-14 NOTE — PROGRESS NOTES
HEARING AID FOLLOW-UP    Whit Sloan was seen today for a hearing aid follow-up.  She is accompanied today by her caregiver/daughter, Vera.    Mrs. Sloan had an audiogram completed prior to testing. Her last audiogram was in 2019.    Audiogram revealed a slight decrease in hearing sensitivity in both ears. New audiogram was input into [employer] and hearing aids were adjusted accordingly.  Mrs. Sloan reported she feels her hearing aid domes are uncomfortable; changed from small open to medium tulip domes.    She reported that the hearing aids were a little loud and all gain was decreased a few clicks in both ears.      Mrs. Sloan reported that she does not wear her hearing aids often; she would get ear pain from the hearing aid tips.  Mrs. Sloan and her daughter were counseled on regular use of hearing aids.  She was also made aware that her hearing aids are set under target and would still having some trouble hearing, especially in noise.  With increased wear time, she would grow a tolerance to sounds and be able to increase gain in hearing aids.    Recommendations:   1. Annual Audiogram  2. Hearing Aid Follow-up as needed  3. Integrity check prior to end of warranty      Hearing Aid Details  ReSound Quattro 5   Gloss Black   Lt SN [id number]   Rt SN [id number]   : 2LP   Dome: Tulip Domes  Warranty Exp 9/22/22    SN [id number]

## 2022-04-14 NOTE — PROGRESS NOTES
Whit Sloan was seen today in the clinic for an audiologic evaluation, prior to her hearing aid follow-up with Dr. Borrego today.  Ms. Sloan reported no perceived changes since. Ms. Sloan denied tinnitus, otalgia, aural fullness, true vertigo and history of noise exposure.    Tympanometry revealed Type A in the right ear and Type A in the left ear.     Audiogram results revealed a mild to moderately-severe sensorineural hearing loss (SNHL) in the right ear and a mild to moderately-severe SNHL in the left ear.      Speech reception thresholds were noted at 50 dBHL in the right ear and 50 dBHL in the left ear.    Speech discrimination scores were 84% in the right ear and 80% in the left ear.    Recommendations:  1. Otologic evaluation  2. Hearing aid follow-up today as scheduled  3. Daily and consistent use of binaural amplification   4. Annual audiogram or sooner if change perceived  5. Hearing protection in noise

## 2022-04-14 NOTE — PROGRESS NOTES
Subjective:       Patient ID: Whit Sloan is a 81 y.o. female.    Chief Complaint: Foreign Body in Ear (Right EAC.)    Foreign Body in Ear  The incident occurred 1 to 3 hours ago. Suspected object: A dome from a hearing aid. The foreign body is suspected to be in the right ear. The incident was reported. The incident was witnessed/reported by the patient. Associated symptoms include hearing loss. Pertinent negatives include no chest pain, congestion, cough, fever, nosebleeds, vomiting or wheezing.     Review of Systems   Constitutional: Negative for activity change, appetite change and fever.   HENT: Positive for hearing loss. Negative for congestion, ear discharge, ear pain, nosebleeds, postnasal drip, rhinorrhea and sneezing.    Eyes: Negative for redness and visual disturbance.   Respiratory: Negative for apnea, cough, shortness of breath and wheezing.    Cardiovascular: Negative for chest pain and palpitations.   Gastrointestinal: Negative for diarrhea and vomiting.   Genitourinary: Negative for difficulty urinating and frequency.   Musculoskeletal: Negative for arthralgias, back pain, gait problem and neck pain.   Skin: Negative for color change and rash.   Neurological: Negative for dizziness, speech difficulty, weakness and headaches.   Hematological: Negative for adenopathy. Does not bruise/bleed easily.   Psychiatric/Behavioral: Negative for agitation and behavioral problems.       Objective:        Constitutional:   Vital signs are normal. She appears well-developed and well-nourished. She is active. Normal speech.      Head:  Normocephalic and atraumatic. Salivary glands normal.  Facial strength is normal.      Ears:  Hearing normal to normal and whispered voice; external ear normal without scars, lesions, or masses; ear canal, tympanic membrane, and middle ear normal. and left ear hearing normal to normal and whispered voice; external ear normal without scars, lesions, or masses; ear canal,  tympanic membrane, and middle ear normal..   Right Ear: A foreign body is present.   PROCEDURE NOTE:  Utilizing the operative microscope in the right EAC, a rubber dome from a hearing aid was removed using alligator forceps. The patient tolerated this procedure without difficulty, inspection of the EAC and TM is normal post-removal, and her hearing levels were reported as back to baseline.    Nose:  Nose normal including turbinates, nasal mucosa, sinuses and nasal septum.     Mouth/Throat  Oropharynx clear and moist without lesions or asymmetry and normal uvula midline.     Neck:  Neck normal without thyromegaly masses, asymmetry, normal tracheal structure, crepitus, and tenderness, thyroid normal, trachea normal, phonation normal and full range of motion with neck supple.     Psychiatric:   She has a normal mood and affect. Her speech is normal and behavior is normal.     Neurological:   She has neurological normal, alert and oriented.     Skin:   No abrasions, lacerations, lesions, or rashes.       Assessment:       1. Foreign body of right ear, initial encounter        Plan:       1. Hearing conservation strongly recommended.  2. Trial of amplification bilaterally also recommended (patient already fitted and wearing).  3. Re-check of hearing in 18-24 months or sooner if subjective change noted.  4. F/U with PCP as per schedule.

## 2022-05-27 ENCOUNTER — TELEPHONE (OUTPATIENT)
Dept: INTERNAL MEDICINE | Facility: CLINIC | Age: 82
End: 2022-05-27
Payer: COMMERCIAL

## 2022-05-30 ENCOUNTER — TELEPHONE (OUTPATIENT)
Dept: FAMILY MEDICINE | Facility: CLINIC | Age: 82
End: 2022-05-30
Payer: COMMERCIAL

## 2022-05-30 NOTE — TELEPHONE ENCOUNTER
----- Message from Charmaine Mata sent at 5/30/2022 10:08 AM CDT -----  Contact: [phone]/Self  Type:  Sooner Apoointment Request    Caller is requesting a sooner appointment.  Caller declined first available appointment listed below.  Caller will not accept being placed on the waitlist and is requesting a message be sent to doctor.  Name of Caller:Pt   When is the first available appointment?07/13/22  Symptoms:3 month follow up   Would the patient rather a call back or a response via [facility]? Callback  Best Call Back Number:[phone]  Additional Information: last two appts has been cancelled

## 2022-06-08 ENCOUNTER — TELEPHONE (OUTPATIENT)
Dept: INTERNAL MEDICINE | Facility: CLINIC | Age: 82
End: 2022-06-08
Payer: COMMERCIAL

## 2022-06-08 NOTE — TELEPHONE ENCOUNTER
----- Message from Pelon Lomas sent at 6/8/2022  3:35 PM CDT -----  Regarding: reschedule  Type:  Patient Returning Call    Who Called:patient     Who Left Message for Patient:office    Does the patient know what this is regarding?:rescheduling     Would the patient rather a call back or a response via [facility]? Call back    Best Call Back Number:[phone]  Additional Information: n/a

## 2022-07-11 ENCOUNTER — TELEPHONE (OUTPATIENT)
Dept: HEMATOLOGY/ONCOLOGY | Facility: CLINIC | Age: 82
End: 2022-07-11
Payer: COMMERCIAL

## 2022-07-11 NOTE — TELEPHONE ENCOUNTER
"----- Message from Salmoon Davis sent at 7/11/2022  2:40 PM CDT -----  Consult/Advisory:          Name Of Caller: Self      Contact Preference?: [phone]      Provider Name: Alker      Does patient feel the need to be seen today? No      What is the nature of the call?: Calling to speak w/ nurse about when she should be seen again. Stating she thought she was supposed to be seen once a year and she hasn't been seen since April of 2021.          Additional Notes:  "Thank you for all that you do for our patients"      "

## 2022-07-19 ENCOUNTER — OFFICE VISIT (OUTPATIENT)
Dept: FAMILY MEDICINE | Facility: CLINIC | Age: 82
End: 2022-07-19
Payer: MEDICARE

## 2022-07-19 ENCOUNTER — LAB VISIT (OUTPATIENT)
Dept: LAB | Facility: HOSPITAL | Age: 82
End: 2022-07-19
Attending: FAMILY MEDICINE
Payer: MEDICARE

## 2022-07-19 VITALS
WEIGHT: 147.94 LBS | HEIGHT: 64 IN | BODY MASS INDEX: 25.25 KG/M2 | DIASTOLIC BLOOD PRESSURE: 62 MMHG | HEART RATE: 74 BPM | SYSTOLIC BLOOD PRESSURE: 120 MMHG | OXYGEN SATURATION: 98 %

## 2022-07-19 DIAGNOSIS — Z79.810 USE OF TAMOXIFEN (NOLVADEX): ICD-10-CM

## 2022-07-19 DIAGNOSIS — N18.4 HYPERTENSIVE KIDNEY DISEASE WITH CHRONIC KIDNEY DISEASE STAGE IV: ICD-10-CM

## 2022-07-19 DIAGNOSIS — I12.9 HYPERTENSIVE KIDNEY DISEASE WITH CHRONIC KIDNEY DISEASE STAGE IV: ICD-10-CM

## 2022-07-19 DIAGNOSIS — E11.69 DM TYPE 2 WITH DIABETIC DYSLIPIDEMIA: Primary | ICD-10-CM

## 2022-07-19 DIAGNOSIS — Z12.39 ENCOUNTER FOR SCREENING FOR MALIGNANT NEOPLASM OF BREAST, UNSPECIFIED SCREENING MODALITY: ICD-10-CM

## 2022-07-19 DIAGNOSIS — Z85.3 HISTORY OF BREAST CANCER: ICD-10-CM

## 2022-07-19 DIAGNOSIS — Z12.31 ENCOUNTER FOR SCREENING MAMMOGRAM FOR MALIGNANT NEOPLASM OF BREAST: ICD-10-CM

## 2022-07-19 DIAGNOSIS — E11.69 DM TYPE 2 WITH DIABETIC DYSLIPIDEMIA: ICD-10-CM

## 2022-07-19 DIAGNOSIS — E78.5 DM TYPE 2 WITH DIABETIC DYSLIPIDEMIA: Primary | ICD-10-CM

## 2022-07-19 DIAGNOSIS — E78.5 DM TYPE 2 WITH DIABETIC DYSLIPIDEMIA: ICD-10-CM

## 2022-07-19 LAB
ESTIMATED AVG GLUCOSE: 177 MG/DL (ref 68–131)
HBA1C MFR BLD: 7.8 % (ref 4–5.6)

## 2022-07-19 PROCEDURE — 99214 OFFICE O/P EST MOD 30 MIN: CPT | Mod: S$PBB,,, | Performed by: FAMILY MEDICINE

## 2022-07-19 PROCEDURE — 99999 PR PBB SHADOW E&M-EST. PATIENT-LVL V: CPT | Mod: PBBFAC,,, | Performed by: FAMILY MEDICINE

## 2022-07-19 PROCEDURE — 36415 COLL VENOUS BLD VENIPUNCTURE: CPT | Mod: PO | Performed by: FAMILY MEDICINE

## 2022-07-19 PROCEDURE — 99999 PR PBB SHADOW E&M-EST. PATIENT-LVL V: ICD-10-PCS | Mod: PBBFAC,,, | Performed by: FAMILY MEDICINE

## 2022-07-19 PROCEDURE — 99214 PR OFFICE/OUTPT VISIT, EST, LEVL IV, 30-39 MIN: ICD-10-PCS | Mod: S$PBB,,, | Performed by: FAMILY MEDICINE

## 2022-07-19 PROCEDURE — 83036 HEMOGLOBIN GLYCOSYLATED A1C: CPT | Performed by: FAMILY MEDICINE

## 2022-07-19 PROCEDURE — 99215 OFFICE O/P EST HI 40 MIN: CPT | Mod: PBBFAC,PO | Performed by: FAMILY MEDICINE

## 2022-07-19 RX ORDER — FINERENONE 10 MG/1
10 TABLET, FILM COATED ORAL
COMMUNITY
Start: 2022-05-11 | End: 2022-11-21 | Stop reason: SDUPTHER

## 2022-07-19 NOTE — PROGRESS NOTES
Subjective:       Patient ID: Whit Sloan is a 81 y.o. female.    Chief Complaint: Follow-up (3 month )    Patient Active Problem List   Diagnosis    Hypertensive kidney disease with chronic kidney disease stage IV    DM type 2 with diabetic dyslipidemia    Osteopenia    Major depressive disorder    DDD (degenerative disc disease), cervical    BMI 25.0-25.9,adult    Mixed conductive and sensorineural hearing loss of both ears    Type 2 diabetes mellitus with stage 4 chronic kidney disease, without long-term current use of insulin    History of breast cancer    Malignant neoplasm of lower-inner quadrant of right female breast    Use of tamoxifen (Nolvadex)    Anemia due to chronic kidney disease      HPI  80 yo female with HTN, CKD, Stage 4, bilateral hearing loss, DM, Type 2, hyperlipidemia, right breast CA on Tamoxifen presents for f/u of her chronic conditions.  Pt is followed by Hem/Onc for hx of breast CA. Is on Tamoxifen.    Last Heme/Onc note - 4/23/2021   Plan to continue Tamoxifen through 8/2021. B MMG due 12/2021.     Tolerating meds well overall.     Review of Systems   All other systems reviewed and are negative.       Objective:     Vitals:    07/19/22 1046   BP: 120/62   Pulse: 74        Physical Exam  Vitals and nursing note reviewed.   Constitutional:       General: She is not in acute distress.     Appearance: Normal appearance. She is not ill-appearing, toxic-appearing or diaphoretic.   HENT:      Head: Normocephalic and atraumatic.   Eyes:      General: No scleral icterus.     Conjunctiva/sclera: Conjunctivae normal.   Cardiovascular:      Rate and Rhythm: Normal rate.      Heart sounds: Normal heart sounds.   Pulmonary:      Effort: Pulmonary effort is normal. No respiratory distress.      Breath sounds: Normal breath sounds.   Abdominal:      General: Bowel sounds are normal.   Skin:     Coloration: Skin is not pale.   Neurological:      Mental Status: She is alert. Mental  status is at baseline.   Psychiatric:         Attention and Perception: Attention and perception normal.         Mood and Affect: Mood and affect normal.         Speech: Speech normal.         Behavior: Behavior normal.         Cognition and Memory: Cognition and memory normal.         Judgment: Judgment normal.         Assessment:       1. DM type 2 with diabetic dyslipidemia    2. Hypertensive kidney disease with chronic kidney disease stage IV    3. History of breast cancer    4. Encounter for screening for malignant neoplasm of breast, unspecified screening modality    5. Use of tamoxifen (Nolvadex)    6. Encounter for screening mammogram for malignant neoplasm of breast         Plan:         DM type 2 with diabetic dyslipidemia  -     Hemoglobin A1C; Future; Expected date: 07/19/2022  -     Hemoglobin A1C; Future; Expected date: 07/19/2022  -     BASIC METABOLIC PANEL; Future; Expected date: 07/19/2022  -     Microalbumin/Creatinine Ratio, Urine; Future; Expected date: 07/19/2022    Hypertensive kidney disease with chronic kidney disease stage IV  -     BASIC METABOLIC PANEL; Future; Expected date: 07/19/2022  - Risk and age appropriate anticipatory guidance. HM reviewed and updated. Recommendations discussed with patient as appropriate.     History of breast cancer  -     Ambulatory referral/consult to Hematology / Oncology; Future; Expected date: 07/26/2022  -     Mammo Digital Screening Bilat; Future; Expected date: 07/19/2022    Use of tamoxifen (Nolvadex)  -     Ambulatory referral/consult to Hematology / Oncology; Future; Expected date: 07/26/2022  - Will message last heme/onc provider to see if any special instructions, otherwise will stop her tamoxifen as planned through 8/2021 and we are now past    Encounter for screening mammogram for malignant neoplasm of breast   -     Mammo Digital Screening Bilat; Future; Expected date: 07/19/2022      Patient's questions answered. Plan reviewed with patient at  the end of visit. Relevant precautions to chief complaint and reasons to seek medical care or contact the office sooner reviewed with patient.     Follow up in about 4 months (around 11/19/2022) for Diabetes Follow-up, w/ PCP, (Prior to visit A1C, BMP, Microalb).                      Mohs Case Number: [id number] Mohs Case Number: [id number]

## 2022-07-19 NOTE — Clinical Note
Mary Jane,  Patient with was seen back in 4/23/2021 by you with note to continue Tamoxifen through 8/2021. She's still on it. Her PCP is on medical leave, and I saw her for primary care f/u today.  Is there any taper process to stopping the tamoxifen? Thanks! Patients daughter called stating her father was seen yesterday for an infected spider bite that is now inflamed and red. She states the bite is not any better and would like to discuss with the nurse.

## 2022-07-20 ENCOUNTER — TELEPHONE (OUTPATIENT)
Dept: ADMINISTRATIVE | Facility: OTHER | Age: 82
End: 2022-07-20
Payer: COMMERCIAL

## 2022-07-20 ENCOUNTER — TELEPHONE (OUTPATIENT)
Dept: FAMILY MEDICINE | Facility: CLINIC | Age: 82
End: 2022-07-20
Payer: COMMERCIAL

## 2022-07-20 NOTE — TELEPHONE ENCOUNTER
----- Message from Daniella Rodriguez MD sent at 7/19/2022  5:11 PM CDT -----  Schedule for MMG prior to Heme/Onc appt if possible

## 2022-07-20 NOTE — TELEPHONE ENCOUNTER
----- Message from Daniella Rodriguez MD sent at 7/19/2022  5:16 PM CDT -----  Please let patient know that her diabetes is stable and to continue her meds as written with follow ups as planned.

## 2022-07-24 DIAGNOSIS — I12.9 HYPERTENSIVE KIDNEY DISEASE WITH CHRONIC KIDNEY DISEASE STAGE IV: ICD-10-CM

## 2022-07-24 DIAGNOSIS — N18.4 HYPERTENSIVE KIDNEY DISEASE WITH CHRONIC KIDNEY DISEASE STAGE IV: ICD-10-CM

## 2022-07-24 NOTE — TELEPHONE ENCOUNTER
No new care gaps identified.  [facility] Embedded Care Gaps. Reference number: [id number]. 7/24/2022   7:48:09 AM RUBAT

## 2022-07-25 RX ORDER — LOSARTAN POTASSIUM 25 MG/1
TABLET ORAL
Qty: 90 TABLET | Refills: 0 | OUTPATIENT
Start: 2022-07-25

## 2022-07-25 NOTE — TELEPHONE ENCOUNTER
Refill Decision Note   Whit Sloan  is requesting a refill authorization.  Brief Assessment and Rationale for Refill:  Quick Discontinue     Medication Therapy Plan:       Medication Reconciliation Completed: No   Comments:     No Care Gaps recommended.     Note composed:6:26 PM 07/25/2022

## 2022-08-11 ENCOUNTER — PES CALL (OUTPATIENT)
Dept: ADMINISTRATIVE | Facility: OTHER | Age: 82
End: 2022-08-11
Payer: COMMERCIAL

## 2022-08-26 ENCOUNTER — TELEPHONE (OUTPATIENT)
Dept: FAMILY MEDICINE | Facility: CLINIC | Age: 82
End: 2022-08-26
Payer: COMMERCIAL

## 2022-08-26 NOTE — TELEPHONE ENCOUNTER
I've spoken to Heme/Onc. She has a follow up appt. Please communicate with patient to stop tamoxifen. No taper needed. Further follow up plan with Demetrius/Once in 9/2022 appt. Tamoxifen taken off her med list

## 2022-08-26 NOTE — TELEPHONE ENCOUNTER
----- Message from Mary Jane Corbett NP sent at 7/19/2022  9:18 PM CDT -----  Regarding: RE:  Hi there! No taper needed. She can stop Tamoxifen. I will ask our nurse to get her in for a follow up.   Thanks so much!   PJ  ----- Message -----  From: Daniella Rodriguez MD  Sent: 7/19/2022   5:13 PM CDT  To: CORNELIA Sanchez,    Patient with was seen back in 4/23/2021 by you with note to continue Tamoxifen through 8/2021. She's still on it. Her PCP is on medical leave, and I saw her for primary care f/u today.   Is there any taper process to stopping the tamoxifen? Thanks!

## 2022-09-08 ENCOUNTER — CLINICAL SUPPORT (OUTPATIENT)
Dept: AUDIOLOGY | Facility: CLINIC | Age: 82
End: 2022-09-08
Payer: MEDICARE

## 2022-09-08 DIAGNOSIS — H90.3 SENSORINEURAL HEARING LOSS, BILATERAL: Primary | ICD-10-CM

## 2022-09-08 PROCEDURE — 99499 UNLISTED E&M SERVICE: CPT | Mod: S$PBB,,, | Performed by: AUDIOLOGIST

## 2022-09-08 PROCEDURE — 99499 NO LOS: ICD-10-PCS | Mod: S$PBB,,, | Performed by: AUDIOLOGIST

## 2022-09-08 NOTE — PROGRESS NOTES
HEARING AID FOLLOW-UP    Ms. Sloan reported that she is not hearing as well as she thought she should with her hearing aids.  Upon arrival, Ms. Sloan's hearing aids were not all the way in her ears. We practiced proper insertion of hearing aids multiple times.    Upon trying to connect hearing aids to software, hearing aids would not connect and were fully charged.  Sending off both hearing aids for in-warranty repair and will follow-up in one month for  fitting.    Programmed demo hearing aids for Mrs. Sloan to take in the mean time; hearing aid loaner agreement was signed.  SN: [id number] (R)/[id number] (L)      Hearing Aid Details  ReSound Quattro 5   Gloss Black   Lt SN [id number]   Rt SN [id number]   : 2LP   Dome: Tulip Domes  Warranty Exp 9/22/22    SN [id number]

## 2022-09-14 ENCOUNTER — HOSPITAL ENCOUNTER (OUTPATIENT)
Dept: RADIOLOGY | Facility: HOSPITAL | Age: 82
Discharge: HOME | End: 2022-09-14
Attending: FAMILY MEDICINE
Payer: MEDICARE

## 2022-09-14 DIAGNOSIS — Z85.3 HISTORY OF BREAST CANCER: ICD-10-CM

## 2022-09-14 DIAGNOSIS — Z12.39 ENCOUNTER FOR SCREENING FOR MALIGNANT NEOPLASM OF BREAST, UNSPECIFIED SCREENING MODALITY: ICD-10-CM

## 2022-09-14 DIAGNOSIS — Z12.31 ENCOUNTER FOR SCREENING MAMMOGRAM FOR MALIGNANT NEOPLASM OF BREAST: ICD-10-CM

## 2022-09-14 PROCEDURE — 77063 BREAST TOMOSYNTHESIS BI: CPT | Mod: 26,,, | Performed by: RADIOLOGY

## 2022-09-14 PROCEDURE — 77067 SCR MAMMO BI INCL CAD: CPT | Mod: 26,,, | Performed by: RADIOLOGY

## 2022-09-14 PROCEDURE — 77063 BREAST TOMOSYNTHESIS BI: CPT | Mod: TC

## 2022-09-14 PROCEDURE — 77067 MAMMO DIGITAL SCREENING BILAT WITH TOMO: ICD-10-PCS | Mod: 26,,, | Performed by: RADIOLOGY

## 2022-09-14 PROCEDURE — 77063 MAMMO DIGITAL SCREENING BILAT WITH TOMO: ICD-10-PCS | Mod: 26,,, | Performed by: RADIOLOGY

## 2022-09-26 ENCOUNTER — OFFICE VISIT (OUTPATIENT)
Dept: HEMATOLOGY/ONCOLOGY | Facility: CLINIC | Age: 82
End: 2022-09-26
Payer: MEDICARE

## 2022-09-26 VITALS
HEIGHT: 64 IN | OXYGEN SATURATION: 99 % | TEMPERATURE: 98 F | RESPIRATION RATE: 18 BRPM | HEART RATE: 75 BPM | DIASTOLIC BLOOD PRESSURE: 72 MMHG | WEIGHT: 151.38 LBS | BODY MASS INDEX: 25.84 KG/M2 | SYSTOLIC BLOOD PRESSURE: 159 MMHG

## 2022-09-26 DIAGNOSIS — Z79.810 USE OF TAMOXIFEN (NOLVADEX): ICD-10-CM

## 2022-09-26 DIAGNOSIS — Z85.3 HISTORY OF BREAST CANCER: ICD-10-CM

## 2022-09-26 PROCEDURE — 99214 OFFICE O/P EST MOD 30 MIN: CPT | Mod: S$PBB,,, | Performed by: INTERNAL MEDICINE

## 2022-09-26 PROCEDURE — 99999 PR PBB SHADOW E&M-EST. PATIENT-LVL IV: ICD-10-PCS | Mod: PBBFAC,,, | Performed by: INTERNAL MEDICINE

## 2022-09-26 PROCEDURE — 99214 OFFICE O/P EST MOD 30 MIN: CPT | Mod: PBBFAC | Performed by: INTERNAL MEDICINE

## 2022-09-26 PROCEDURE — 99214 PR OFFICE/OUTPT VISIT, EST, LEVL IV, 30-39 MIN: ICD-10-PCS | Mod: S$PBB,,, | Performed by: INTERNAL MEDICINE

## 2022-09-26 PROCEDURE — 99999 PR PBB SHADOW E&M-EST. PATIENT-LVL IV: CPT | Mod: PBBFAC,,, | Performed by: INTERNAL MEDICINE
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
## 2022-09-28 PROBLEM — Z79.810 USE OF TAMOXIFEN (NOLVADEX): Status: RESOLVED | Noted: 2017-06-28 | Resolved: 2022-09-28

## 2022-10-12 NOTE — PROGRESS NOTES
HEARING AID FOLLOW-UP    Whit Sloan was seen today for a hearing aid follow-up after her hearing aids returned from an in-warranty repair for intermittent power and intermittent charging.    Hearing aids were connected wirelessly to [employer] and fit with patient's settings.    Mrs. Sloan practiced inserting hearing aids correctly in both ears and was counseled daily wear of hearing aids.    It is recommended that Mrs. Sloan return for her annual audiogram after  and return to clinic as needed for hearing aid adjustments.  She was notified that her hearing aids are no longer under warranty.      Hearing Aid Details  ReSound Quattro 5   Gloss Black   Lt SN [id number]   Rt SN [id number]   : 2LP   Dome: [employer]  Warranty Exp 22    SN [id number]

## 2022-10-13 ENCOUNTER — CLINICAL SUPPORT (OUTPATIENT)
Dept: AUDIOLOGY | Facility: CLINIC | Age: 82
End: 2022-10-13
Payer: MEDICARE

## 2022-10-13 DIAGNOSIS — H90.3 SENSORINEURAL HEARING LOSS, BILATERAL: Primary | ICD-10-CM

## 2022-10-13 PROCEDURE — 99499 NO LOS: ICD-10-PCS | Mod: S$PBB,,, | Performed by: AUDIOLOGIST

## 2022-10-13 PROCEDURE — 99499 UNLISTED E&M SERVICE: CPT | Mod: S$PBB,,, | Performed by: AUDIOLOGIST

## 2022-10-17 ENCOUNTER — LAB VISIT (OUTPATIENT)
Dept: LAB | Facility: HOSPITAL | Age: 82
End: 2022-10-17
Attending: FAMILY MEDICINE
Payer: MEDICARE

## 2022-10-17 DIAGNOSIS — E78.5 DM TYPE 2 WITH DIABETIC DYSLIPIDEMIA: ICD-10-CM

## 2022-10-17 DIAGNOSIS — E11.69 DM TYPE 2 WITH DIABETIC DYSLIPIDEMIA: ICD-10-CM

## 2022-10-17 LAB
ALBUMIN/CREAT UR: 4.3 UG/MG (ref 0–30)
CREAT UR-MCNC: 139 MG/DL (ref 15–325)
MICROALBUMIN UR DL<=1MG/L-MCNC: 6 UG/ML

## 2022-10-17 PROCEDURE — 82570 ASSAY OF URINE CREATININE: CPT | Performed by: FAMILY MEDICINE

## 2022-10-18 ENCOUNTER — TELEPHONE (OUTPATIENT)
Dept: INTERNAL MEDICINE | Facility: CLINIC | Age: 82
End: 2022-10-18
Payer: COMMERCIAL

## 2022-10-18 NOTE — TELEPHONE ENCOUNTER
----- Message from Jacqueline Garcia NP sent at 10/18/2022  2:55 PM CDT -----  Urine test is normal

## 2022-10-20 ENCOUNTER — TELEPHONE (OUTPATIENT)
Dept: INTERNAL MEDICINE | Facility: CLINIC | Age: 82
End: 2022-10-20
Payer: COMMERCIAL

## 2022-10-20 NOTE — TELEPHONE ENCOUNTER
----- Message from Jacqueline Garcia NP sent at 10/18/2022  2:57 PM CDT -----  A1C improved slightly from 7.8 to 7.7%, continue to follow proper diet and stay physically active  Will discuss further at next month's appt  Kidney function remains decreased but slightly improved compared to previous reports; follow up with Nephrology externally as scheduled

## 2022-10-20 NOTE — TELEPHONE ENCOUNTER
Called to give patient results. NA/LM        A1C improved slightly from 7.8 to 7.7%, continue to follow proper diet and stay physically active  Will discuss further at next month's appt  Kidney function remains decreased but slightly improved compared to previous reports; follow up with Nephrology externally as scheduled

## 2022-11-21 ENCOUNTER — OFFICE VISIT (OUTPATIENT)
Dept: FAMILY MEDICINE | Facility: CLINIC | Age: 82
End: 2022-11-21
Payer: MEDICARE

## 2022-11-21 VITALS
DIASTOLIC BLOOD PRESSURE: 64 MMHG | WEIGHT: 150.38 LBS | OXYGEN SATURATION: 100 % | SYSTOLIC BLOOD PRESSURE: 130 MMHG | BODY MASS INDEX: 25.67 KG/M2 | HEIGHT: 64 IN | HEART RATE: 74 BPM

## 2022-11-21 DIAGNOSIS — I12.9 HYPERTENSIVE KIDNEY DISEASE WITH CHRONIC KIDNEY DISEASE STAGE IV: ICD-10-CM

## 2022-11-21 DIAGNOSIS — Z23 NEED FOR INFLUENZA VACCINATION: ICD-10-CM

## 2022-11-21 DIAGNOSIS — E78.5 DM TYPE 2 WITH DIABETIC DYSLIPIDEMIA: Primary | ICD-10-CM

## 2022-11-21 DIAGNOSIS — E11.69 DM TYPE 2 WITH DIABETIC DYSLIPIDEMIA: Primary | ICD-10-CM

## 2022-11-21 DIAGNOSIS — N18.4 HYPERTENSIVE KIDNEY DISEASE WITH CHRONIC KIDNEY DISEASE STAGE IV: ICD-10-CM

## 2022-11-21 PROBLEM — H40.1134: Status: ACTIVE | Noted: 2022-09-20

## 2022-11-21 PROBLEM — E87.5 DRUG-INDUCED HYPERKALEMIA: Status: ACTIVE | Noted: 2022-05-11

## 2022-11-21 PROBLEM — T50.905A DRUG-INDUCED HYPERKALEMIA: Status: ACTIVE | Noted: 2022-05-11

## 2022-11-21 PROBLEM — H01.00B BLEPHARITIS OF UPPER AND LOWER EYELIDS OF BOTH EYES: Status: ACTIVE | Noted: 2022-09-20

## 2022-11-21 PROBLEM — I10 ESSENTIAL (PRIMARY) HYPERTENSION: Status: ACTIVE | Noted: 2019-04-21

## 2022-11-21 PROBLEM — E08.3213: Status: ACTIVE | Noted: 2022-09-20

## 2022-11-21 PROBLEM — H25.813 COMBINED FORMS OF AGE-RELATED CATARACT OF BOTH EYES: Status: ACTIVE | Noted: 2022-09-20

## 2022-11-21 PROBLEM — H43.393 VITREOUS SYNERESIS, BILATERAL: Status: ACTIVE | Noted: 2022-09-20

## 2022-11-21 PROBLEM — K63.5 COLONIC POLYP: Status: ACTIVE | Noted: 2022-11-21

## 2022-11-21 PROBLEM — N25.81 SECONDARY HYPERPARATHYROIDISM: Status: ACTIVE | Noted: 2019-03-26

## 2022-11-21 PROBLEM — E73.9 LACTOSE INTOLERANCE: Status: ACTIVE | Noted: 2022-11-21

## 2022-11-21 PROBLEM — E55.9 VITAMIN D DEFICIENCY: Status: ACTIVE | Noted: 2019-03-26

## 2022-11-21 PROBLEM — E79.0 HYPERURICEMIA: Status: ACTIVE | Noted: 2022-11-21

## 2022-11-21 PROBLEM — Z86.73 H/O: STROKE: Status: ACTIVE | Noted: 2019-04-21

## 2022-11-21 PROBLEM — H01.00A BLEPHARITIS OF UPPER AND LOWER EYELIDS OF BOTH EYES: Status: ACTIVE | Noted: 2022-09-20

## 2022-11-21 PROCEDURE — 99999 PR PBB SHADOW E&M-EST. PATIENT-LVL IV: CPT | Mod: PBBFAC,,, | Performed by: NURSE PRACTITIONER

## 2022-11-21 PROCEDURE — 99214 PR OFFICE/OUTPT VISIT, EST, LEVL IV, 30-39 MIN: ICD-10-PCS | Mod: S$PBB,,, | Performed by: NURSE PRACTITIONER

## 2022-11-21 PROCEDURE — G0008 ADMIN INFLUENZA VIRUS VAC: HCPCS | Mod: PBBFAC,PO

## 2022-11-21 PROCEDURE — 99214 OFFICE O/P EST MOD 30 MIN: CPT | Mod: PBBFAC,PO,25 | Performed by: NURSE PRACTITIONER

## 2022-11-21 PROCEDURE — 99214 OFFICE O/P EST MOD 30 MIN: CPT | Mod: S$PBB,,, | Performed by: NURSE PRACTITIONER

## 2022-11-21 PROCEDURE — 99999 PR PBB SHADOW E&M-EST. PATIENT-LVL IV: ICD-10-PCS | Mod: PBBFAC,,, | Performed by: NURSE PRACTITIONER

## 2022-11-21 RX ORDER — ALLOPURINOL 100 MG/1
1 TABLET ORAL DAILY
COMMUNITY
Start: 2022-07-27

## 2022-11-21 RX ORDER — FINERENONE 10 MG/1
10 TABLET, FILM COATED ORAL
COMMUNITY
Start: 2022-09-24

## 2022-11-21 RX ORDER — TAMOXIFEN CITRATE 20 MG/1
20 TABLET ORAL DAILY
COMMUNITY
Start: 2022-11-12 | End: 2022-11-21 | Stop reason: ALTCHOICE

## 2022-11-21 NOTE — PROGRESS NOTES
Subjective:       Patient ID: Whit Sloan is a 82 y.o. female.    Chief Complaint: Results    This is a pleasant 83 yo female patient of Dr. Meyers who is new to me. She presents today accompanied by her daughter for lab review. PMH includes    Patient Active Problem List:     Hypertensive kidney disease with chronic kidney disease stage IV     DM type 2 with diabetic dyslipidemia     Osteopenia     Major depressive disorder     DDD (degenerative disc disease), cervical     BMI 25.0-25.9,adult     Mixed conductive and sensorineural hearing loss of both ears     Type 2 diabetes mellitus with stage 4 chronic kidney disease, without long-term current use of insulin     History of breast cancer     Malignant neoplasm of lower-inner quadrant of right female breast     Anemia due to chronic kidney disease    VSS today. Denies chest pain, SOB, dyspnea, palpitations, visual changes, HA, dizziness, or N/V/D. Compliant with current medication regimen. Denies polydipsia, polyphagia, neuropathy. Reports visual changes and polyuria. Following up with Optometry for glaucoma. Reviewed recent labs: A1C reduced from 7.8 to 7.7%, kidney function decreased but stable. Trying to follow proper diet, admits to eating some starchy foods. Avoiding fatty foods. Drinks at least 4 bottles of water daily. Drinks 2 cups of coffee daily. FBG levels range 108-146. Has no exercise routine currently but plans to start soon.    Review of Systems   Eyes:         Chronic visual changes   Endocrine: Positive for polyuria.     Otherwise negative  Objective:      Physical Exam  Vitals reviewed.   Constitutional:       General: She is awake. She is not in acute distress.     Appearance: Normal appearance. She is well-developed, well-groomed and overweight.   HENT:      Head: Normocephalic and atraumatic.      Right Ear: External ear normal.      Left Ear: External ear normal.   Eyes:      General:         Right eye: No discharge.         Left eye: No  discharge.   Neck:      Vascular: No carotid bruit.   Cardiovascular:      Rate and Rhythm: Normal rate.      Pulses:           Radial pulses are 2+ on the right side and 2+ on the left side.      Heart sounds: No murmur heard.  Pulmonary:      Effort: Pulmonary effort is normal. No respiratory distress.      Breath sounds: Normal breath sounds. No wheezing or rhonchi.   Abdominal:      General: There is no distension.   Musculoskeletal:      Right lower leg: No edema.   Lymphadenopathy:      Cervical: No cervical adenopathy.   Skin:     General: Skin is warm and dry.      Coloration: Skin is not pale.   Neurological:      Mental Status: She is alert and oriented to person, place, and time.      Coordination: Coordination normal.      Gait: Gait normal.   Psychiatric:         Attention and Perception: Attention normal.         Mood and Affect: Mood and affect normal.         Speech: Speech normal.         Behavior: Behavior normal. Behavior is cooperative.       Assessment:       Problem List Items Addressed This Visit          Cardiac/Vascular    DM type 2 with diabetic dyslipidemia - Primary    Relevant Orders    Hemoglobin A1C    BASIC METABOLIC PANEL    Lipid Panel       Renal/    Hypertensive kidney disease with chronic kidney disease stage IV     Other Visit Diagnoses       Need for influenza vaccination        Relevant Orders    Influenza (FLUAD) - Quadrivalent (Adjuvanted) *Preferred* (65+) (PF) (Completed)    Body mass index (BMI) of 25.0-25.9 in adult                  Plan:       Whit was seen today for results.    Diagnoses and all orders for this visit:    DM type 2 with diabetic dyslipidemia  -     Hemoglobin A1C; Future  -     BASIC METABOLIC PANEL; Future  -     Lipid Panel; Future  -     Hemoglobin A1C  -     BASIC METABOLIC PANEL  -     Lipid Panel    Hypertensive kidney disease with chronic kidney disease stage IV    Need for influenza vaccination  -     Influenza (FLUAD) - Quadrivalent  (Adjuvanted) *Preferred* (65+) (PF)    Body mass index (BMI) of 25.0-25.9 in adult          - Continue with current medication regimen  - Encouraged patient to eat a low salt/low fat ADA diet and discussed importance of engaging in physical activity at least 5x/week for a minimum of 30 min/day  - HD flu to be given today  - RTC in 4 months for DM follow-up with pre-labs, or sooner if needed          I spent a total of 30 minutes on the day of the visit.  This includes face to face time and non-face to face time preparing to see the patient (eg, review of tests), obtaining and/or reviewing separately obtained history, documenting clinical information in the electronic or other health record, independently interpreting results and communicating results to the patient/family/caregiver, or care coordinator.

## 2023-03-28 LAB
BUN SERPL-MCNC: 23 MG/DL (ref 7–25)
BUN/CREAT SERPL: 14 (CALC) (ref 6–22)
CALCIUM SERPL-MCNC: 9.9 MG/DL (ref 8.6–10.4)
CHLORIDE SERPL-SCNC: 109 MMOL/L (ref 98–110)
CHOLEST SERPL-MCNC: 167 MG/DL
CHOLEST/HDLC SERPL: 2.3 (CALC)
CO2 SERPL-SCNC: 23 MMOL/L (ref 20–32)
CREAT SERPL-MCNC: 1.65 MG/DL (ref 0.6–0.95)
EGFR: 31 ML/MIN/1.73M2
GLUCOSE SERPL-MCNC: 107 MG/DL (ref 65–99)
HBA1C MFR BLD: 7.8 % OF TOTAL HGB
HDLC SERPL-MCNC: 72 MG/DL
LDLC SERPL CALC-MCNC: 81 MG/DL (CALC)
NONHDLC SERPL-MCNC: 95 MG/DL (CALC)
POTASSIUM SERPL-SCNC: 5.1 MMOL/L (ref 3.5–5.3)
SODIUM SERPL-SCNC: 143 MMOL/L (ref 135–146)
TRIGL SERPL-MCNC: 56 MG/DL

## 2023-03-29 ENCOUNTER — TELEPHONE (OUTPATIENT)
Dept: FAMILY MEDICINE | Facility: CLINIC | Age: 83
End: 2023-03-29
Payer: COMMERCIAL

## 2023-03-29 NOTE — TELEPHONE ENCOUNTER
Patient notified:  A1C at 7.8%, was at 7.7% before, please be sure to follow proper diet and take medications as ordered, also be sure to follow up with Dr. Meyers in 1 month   Normal cholesterol and triglyceride levels   Decreased kidney function but slightly improved compared to previous result   Verbalized understanding.

## 2023-04-25 ENCOUNTER — TELEPHONE (OUTPATIENT)
Dept: FAMILY MEDICINE | Facility: CLINIC | Age: 83
End: 2023-04-25
Payer: MEDICARE

## 2023-04-25 NOTE — TELEPHONE ENCOUNTER
----- Message from Cj Umana sent at 4/25/2023  2:07 PM CDT -----  Contact: Pt  .Type:  Needs Medical Advice    Who Called: Pt  Would the patient rather a call back or a response via [facility]? call  Best Call Back Number: [phone]  Additional Information:    Pt wanted to know if she needed to get blood work done for her upcoming appt on 5/2.

## 2023-05-02 ENCOUNTER — OFFICE VISIT (OUTPATIENT)
Dept: FAMILY MEDICINE | Facility: CLINIC | Age: 83
End: 2023-05-02
Payer: MEDICARE

## 2023-05-02 VITALS
HEIGHT: 64 IN | DIASTOLIC BLOOD PRESSURE: 62 MMHG | HEART RATE: 82 BPM | BODY MASS INDEX: 25.25 KG/M2 | OXYGEN SATURATION: 98 % | WEIGHT: 147.94 LBS | SYSTOLIC BLOOD PRESSURE: 130 MMHG

## 2023-05-02 DIAGNOSIS — E11.69 DM TYPE 2 WITH DIABETIC DYSLIPIDEMIA: Primary | ICD-10-CM

## 2023-05-02 DIAGNOSIS — D63.1 ANEMIA DUE TO STAGE 4 CHRONIC KIDNEY DISEASE: ICD-10-CM

## 2023-05-02 DIAGNOSIS — F32.1 CURRENT MODERATE EPISODE OF MAJOR DEPRESSIVE DISORDER, UNSPECIFIED WHETHER RECURRENT: ICD-10-CM

## 2023-05-02 DIAGNOSIS — N18.4 HYPERTENSIVE KIDNEY DISEASE WITH CHRONIC KIDNEY DISEASE STAGE IV: ICD-10-CM

## 2023-05-02 DIAGNOSIS — E11.22 TYPE 2 DIABETES MELLITUS WITH STAGE 4 CHRONIC KIDNEY DISEASE, WITHOUT LONG-TERM CURRENT USE OF INSULIN: ICD-10-CM

## 2023-05-02 DIAGNOSIS — I12.9 HYPERTENSIVE KIDNEY DISEASE WITH CHRONIC KIDNEY DISEASE STAGE IV: ICD-10-CM

## 2023-05-02 DIAGNOSIS — H90.6 MIXED CONDUCTIVE AND SENSORINEURAL HEARING LOSS OF BOTH EARS: ICD-10-CM

## 2023-05-02 DIAGNOSIS — N18.4 ANEMIA DUE TO STAGE 4 CHRONIC KIDNEY DISEASE: ICD-10-CM

## 2023-05-02 DIAGNOSIS — N25.81 SECONDARY HYPERPARATHYROIDISM: ICD-10-CM

## 2023-05-02 DIAGNOSIS — Z85.3 HISTORY OF BREAST CANCER: ICD-10-CM

## 2023-05-02 DIAGNOSIS — N18.4 TYPE 2 DIABETES MELLITUS WITH STAGE 4 CHRONIC KIDNEY DISEASE, WITHOUT LONG-TERM CURRENT USE OF INSULIN: ICD-10-CM

## 2023-05-02 DIAGNOSIS — H40.1134 CHRONIC OPEN ANGLE GLAUCOMA OF BOTH EYES, INDETERMINATE STAGE: ICD-10-CM

## 2023-05-02 DIAGNOSIS — E78.5 DM TYPE 2 WITH DIABETIC DYSLIPIDEMIA: Primary | ICD-10-CM

## 2023-05-02 DIAGNOSIS — E08.3213 MILD NONPROLIFERATIVE DIABETIC RETINOPATHY OF BOTH EYES WITH MACULAR EDEMA ASSOCIATED WITH DIABETES MELLITUS DUE TO UNDERLYING CONDITION: ICD-10-CM

## 2023-05-02 PROBLEM — I10 ESSENTIAL (PRIMARY) HYPERTENSION: Status: RESOLVED | Noted: 2019-04-21 | Resolved: 2023-05-02

## 2023-05-02 PROCEDURE — 99999 PR PBB SHADOW E&M-EST. PATIENT-LVL IV: CPT | Mod: PBBFAC,,, | Performed by: FAMILY MEDICINE

## 2023-05-02 PROCEDURE — 99215 OFFICE O/P EST HI 40 MIN: CPT | Mod: S$PBB,,, | Performed by: FAMILY MEDICINE

## 2023-05-02 PROCEDURE — 99214 OFFICE O/P EST MOD 30 MIN: CPT | Mod: PBBFAC,PO | Performed by: FAMILY MEDICINE

## 2023-05-02 PROCEDURE — 99999 PR PBB SHADOW E&M-EST. PATIENT-LVL IV: ICD-10-PCS | Mod: PBBFAC,,, | Performed by: FAMILY MEDICINE

## 2023-05-02 PROCEDURE — 99215 PR OFFICE/OUTPT VISIT, EST, LEVL V, 40-54 MIN: ICD-10-PCS | Mod: S$PBB,,, | Performed by: FAMILY MEDICINE

## 2023-05-02 RX ORDER — DORZOLAMIDE HYDROCHLORIDE AND TIMOLOL MALEATE 20; 5 MG/ML; MG/ML
1 SOLUTION/ DROPS OPHTHALMIC 2 TIMES DAILY
COMMUNITY
Start: 2023-04-14

## 2023-05-02 NOTE — PROGRESS NOTES
Subjective:       Patient ID: Whit Sloan is a 82 y.o. female.    Chief Complaint: Hypertension and Diabetes  81 yo female with HTN, CKD, Stage 4, bilateral hearing loss, DM, Type 2, hyperlipidemia, and h/o right breast CA presents for f/u of her chronic conditions.  Hypertension  This is a chronic problem. The current episode started more than 1 year ago. The problem is unchanged. The problem is controlled. Pertinent negatives include no chest pain, palpitations or shortness of breath. The current treatment provides significant improvement. There are no compliance problems.  Hypertensive end-organ damage includes kidney disease.   Diabetes  She presents for her follow-up diabetic visit. She has type 2 diabetes mellitus. There are no hypoglycemic associated symptoms. Pertinent negatives for hypoglycemia include no nervousness/anxiousness. Pertinent negatives for diabetes include no chest pain, no fatigue, no polydipsia, no polyphagia, no polyuria and no weakness. There are no hypoglycemic complications.   Last seen by Dr. Salinas Sept 2022 for h/o breast CA. Tamoxifen was discontinued.  No regular exercise.  Is followed by Dr. Angie Sprague for CKD, Stage 4 and hyperparathyroidism. Last visit 3/16/23.  Is followed by Dr. Lee for glaucoma and retinopathy. Last visit 4/14/23.  Results for orders placed or performed in visit on 11/21/22   Hemoglobin A1C   Result Value Ref Range    Hemoglobin A1C 7.8 (H) <5.7 % of total Hgb   BASIC METABOLIC PANEL   Result Value Ref Range    Glucose 107 (H) 65 - 99 mg/dL    BUN 23 7 - 25 mg/dL    Creatinine 1.65 (H) 0.60 - 0.95 mg/dL    eGFR 31 (L) > OR = 60 mL/min/1.73m2    BUN/Creatinine Ratio 14 6 - 22 (calc)    Sodium 143 135 - 146 mmol/L    Potassium 5.1 3.5 - 5.3 mmol/L    Chloride 109 98 - 110 mmol/L    CO2 23 20 - 32 mmol/L    Calcium 9.9 8.6 - 10.4 mg/dL   Lipid Panel   Result Value Ref Range    Cholesterol 167 <200 mg/dL    HDL 72 > OR = 50 mg/dL    Triglycerides 56 <150  mg/dL    LDL Cholesterol 81 mg/dL (calc)    HDL/Cholesterol Ratio 2.3 <5.0 (calc)    Non HDL Chol. (LDL+VLDL) 95 <130 mg/dL (calc)      Review of Systems   Constitutional:  Negative for chills, fatigue and fever.   Respiratory:  Negative for shortness of breath.    Cardiovascular:  Positive for leg swelling. Negative for chest pain and palpitations.   Gastrointestinal:  Negative for abdominal pain, anal bleeding, blood in stool, nausea and vomiting.   Endocrine: Negative for polydipsia, polyphagia and polyuria.   Genitourinary:  Negative for dysuria and hematuria.        No urinary leakage.   Neurological:  Negative for weakness.   Psychiatric/Behavioral:  Negative for dysphoric mood, sleep disturbance and suicidal ideas. The patient is not nervous/anxious.         Depression is controlled. No SI or HI.     Objective:      Physical Exam  Vitals reviewed.   Constitutional:       General: She is not in acute distress.     Appearance: Normal appearance. She is not ill-appearing.   HENT:      Head: Normocephalic and atraumatic.      Right Ear: External ear normal.      Left Ear: External ear normal.   Eyes:      General:         Right eye: No discharge.         Left eye: No discharge.      Extraocular Movements: Extraocular movements intact.   Neck:      Vascular: No carotid bruit.   Cardiovascular:      Rate and Rhythm: Normal rate and regular rhythm.      Heart sounds: Normal heart sounds. No murmur heard.    No gallop.   Pulmonary:      Effort: Pulmonary effort is normal.      Breath sounds: Normal breath sounds. No wheezing or rales.   Abdominal:      General: Bowel sounds are normal.      Palpations: Abdomen is soft. There is no mass.      Tenderness: There is no abdominal tenderness.   Musculoskeletal:      Cervical back: Normal range of motion and neck supple. No rigidity or tenderness.      Right lower leg: No edema.      Left lower leg: No edema.   Skin:     General: Skin is warm and dry.   Neurological:       Mental Status: She is alert and oriented to person, place, and time.   Psychiatric:         Mood and Affect: Mood normal.       Assessment:     See plan      Plan:       DM type 2 with diabetic dyslipidemia: Stable  -     Comprehensive Metabolic Panel; Future; Expected date: 08/02/2023  -     Hemoglobin A1C; Future; Expected date: 08/02/2023    Hypertensive kidney disease with chronic kidney disease stage IV: Stable  - F/U with Dr. Angie Sprague    Anemia due to stage 4 chronic kidney disease: Stable  - F/U with Dr. Sprague    Type 2 diabetes mellitus with stage 4 chronic kidney disease, without long-term current use of insulin: Stable    Secondary hyperparathyroidism: Stable  - F/U with Dr. 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 Lee    Current moderate episode of major depressive disorder, unspecified whether recurrent: Stable    Chronic open angle glaucoma of both eyes, indeterminate stage: Stable  - F/U with Dr. eLe    History of breast cancer         F/U in 4 months.

## 2023-06-10 DIAGNOSIS — E11.22 TYPE 2 DIABETES MELLITUS WITH STAGE 4 CHRONIC KIDNEY DISEASE, WITHOUT LONG-TERM CURRENT USE OF INSULIN: ICD-10-CM

## 2023-06-10 DIAGNOSIS — N18.4 TYPE 2 DIABETES MELLITUS WITH STAGE 4 CHRONIC KIDNEY DISEASE, WITHOUT LONG-TERM CURRENT USE OF INSULIN: ICD-10-CM

## 2023-06-10 NOTE — TELEPHONE ENCOUNTER
Care Due:                  Date            Visit Type   Department     Provider  --------------------------------------------------------------------------------                                EP -                              PRIMARY      [facility]  Last Visit: 05-      CARE ([facility])   AMERICA Meyers                              EP -                              [facility]  Next Visit: 09-      CARE ([facility])   MEDICINE       Ingrid Meyers                                                            Last  Test          Frequency    Reason                     Performed    Due Date  --------------------------------------------------------------------------------    CMP.........  12 months..  simvastatin..............  02-   02-    Health [facility] Embedded Care Due Messages. Reference number: [id number].   6/10/2023 9:15:35 AM CDT

## 2023-06-11 RX ORDER — SITAGLIPTIN 50 MG/1
TABLET, FILM COATED ORAL
Qty: 90 TABLET | Refills: 1 | Status: SHIPPED | OUTPATIENT
Start: 2023-06-11 | End: 2023-10-04 | Stop reason: SDUPTHER

## 2023-07-13 DIAGNOSIS — E78.5 DM TYPE 2 WITH DIABETIC DYSLIPIDEMIA: ICD-10-CM

## 2023-07-13 DIAGNOSIS — E11.69 DM TYPE 2 WITH DIABETIC DYSLIPIDEMIA: ICD-10-CM

## 2023-07-13 RX ORDER — GLIPIZIDE 10 MG/1
TABLET ORAL
Qty: 180 TABLET | Refills: 0 | Status: SHIPPED | OUTPATIENT
Start: 2023-07-13 | End: 2023-10-04

## 2023-07-13 RX ORDER — SIMVASTATIN 40 MG/1
TABLET, FILM COATED ORAL
Qty: 90 TABLET | Refills: 0 | Status: SHIPPED | OUTPATIENT
Start: 2023-07-13 | End: 2023-10-04

## 2023-07-13 NOTE — TELEPHONE ENCOUNTER
Refill Routing Note   Medication(s) are not appropriate for processing by [facility] for the following reason(s):      Required labs outdated: LFTs    [facility] action(s):  Defer  Approve Care Due:  Labs due: A1C due 9/24/23   Glucotrol: Cr improving and no recent hypoglycemic episodes.    Pharmacist review requested: Yes     Appointments  past 12m or future 3m with PCP    Date Provider   Last Visit   5/2/2023 Ingrid Meyers MD   Next Visit   9/5/2023 Ingrid Meyers MD   ED visits in past 90 days: 0        Note composed:3:57 PM 07/13/2023

## 2023-07-13 NOTE — TELEPHONE ENCOUNTER
Refill Routing Note   Medication(s) are not appropriate for processing by [facility] for the following reason(s):      Required labs outdated  Required labs abnormal    [facility] action(s):  Defer Labs due        Pharmacist review requested: Yes     Appointments  past 12m or future 3m with PCP    Date Provider   Last Visit   5/2/2023 Ingrid Meyers MD   Next Visit   9/5/2023 Ingrid Meyers MD   ED visits in past 90 days: 0        Note composed:3:50 PM 07/13/2023

## 2023-07-13 NOTE — TELEPHONE ENCOUNTER
Care Due:                  Date            Visit Type   Department     Provider  --------------------------------------------------------------------------------                                EP -                              PRIMARY      [facility]  Last Visit: 05-      CARE ([facility])   AMERICA Meyers                              EP -                              [facility]  Next Visit: 09-      CARE ([facility])   [facility]       Ingrid Meyers                                                            Last  Test          Frequency    Reason                     Performed    Due Date  --------------------------------------------------------------------------------    HBA1C.......  6 months...  PEPE glipiZIDE.......  03- 09-    Health [facility] Embedded Care Due Messages. Reference number: [id number].   7/13/2023 1:29:09 AM CDT

## 2023-07-27 ENCOUNTER — PES CALL (OUTPATIENT)
Dept: ADMINISTRATIVE | Facility: CLINIC | Age: 83
End: 2023-07-27
Payer: MEDICARE

## 2023-08-31 LAB
ALBUMIN SERPL-MCNC: 3.9 G/DL (ref 3.6–5.1)
ALBUMIN/GLOB SERPL: 1.1 (CALC) (ref 1–2.5)
ALP SERPL-CCNC: 84 U/L (ref 37–153)
ALT SERPL-CCNC: 8 U/L (ref 6–29)
AST SERPL-CCNC: 16 U/L (ref 10–35)
BILIRUB SERPL-MCNC: 0.6 MG/DL (ref 0.2–1.2)
BUN SERPL-MCNC: 24 MG/DL (ref 7–25)
BUN/CREAT SERPL: 17 (CALC) (ref 6–22)
CALCIUM SERPL-MCNC: 9.8 MG/DL (ref 8.6–10.4)
CHLORIDE SERPL-SCNC: 105 MMOL/L (ref 98–110)
CO2 SERPL-SCNC: 25 MMOL/L (ref 20–32)
CREAT SERPL-MCNC: 1.42 MG/DL (ref 0.6–0.95)
EGFR: 36 ML/MIN/1.73M2
GLOBULIN SER CALC-MCNC: 3.4 G/DL (CALC) (ref 1.9–3.7)
GLUCOSE SERPL-MCNC: 167 MG/DL (ref 65–99)
HBA1C MFR BLD: 8.2 % OF TOTAL HGB
POTASSIUM SERPL-SCNC: 5.1 MMOL/L (ref 3.5–5.3)
PROT SERPL-MCNC: 7.3 G/DL (ref 6.1–8.1)
SODIUM SERPL-SCNC: 137 MMOL/L (ref 135–146)

## 2023-09-05 ENCOUNTER — OFFICE VISIT (OUTPATIENT)
Dept: FAMILY MEDICINE | Facility: CLINIC | Age: 83
End: 2023-09-05
Payer: MEDICARE

## 2023-09-05 VITALS
SYSTOLIC BLOOD PRESSURE: 110 MMHG | BODY MASS INDEX: 24.69 KG/M2 | HEART RATE: 94 BPM | DIASTOLIC BLOOD PRESSURE: 80 MMHG | WEIGHT: 144.63 LBS | HEIGHT: 64 IN | OXYGEN SATURATION: 98 %

## 2023-09-05 DIAGNOSIS — M25.511 CHRONIC PAIN OF BOTH SHOULDERS: ICD-10-CM

## 2023-09-05 DIAGNOSIS — F41.9 ANXIETY: ICD-10-CM

## 2023-09-05 DIAGNOSIS — N18.4 HYPERTENSIVE KIDNEY DISEASE WITH CHRONIC KIDNEY DISEASE STAGE IV: Primary | ICD-10-CM

## 2023-09-05 DIAGNOSIS — E11.69 DM TYPE 2 WITH DIABETIC DYSLIPIDEMIA: ICD-10-CM

## 2023-09-05 DIAGNOSIS — G89.29 CHRONIC PAIN OF BOTH SHOULDERS: ICD-10-CM

## 2023-09-05 DIAGNOSIS — N18.4 TYPE 2 DIABETES MELLITUS WITH STAGE 4 CHRONIC KIDNEY DISEASE, WITHOUT LONG-TERM CURRENT USE OF INSULIN: ICD-10-CM

## 2023-09-05 DIAGNOSIS — I12.9 HYPERTENSIVE KIDNEY DISEASE WITH CHRONIC KIDNEY DISEASE STAGE IV: Primary | ICD-10-CM

## 2023-09-05 DIAGNOSIS — E11.65 UNCONTROLLED TYPE 2 DIABETES MELLITUS WITH HYPERGLYCEMIA: ICD-10-CM

## 2023-09-05 DIAGNOSIS — E11.22 TYPE 2 DIABETES MELLITUS WITH STAGE 4 CHRONIC KIDNEY DISEASE, WITHOUT LONG-TERM CURRENT USE OF INSULIN: ICD-10-CM

## 2023-09-05 DIAGNOSIS — F32.1 CURRENT MODERATE EPISODE OF MAJOR DEPRESSIVE DISORDER, UNSPECIFIED WHETHER RECURRENT: ICD-10-CM

## 2023-09-05 DIAGNOSIS — Z23 NEED FOR INFLUENZA VACCINATION: ICD-10-CM

## 2023-09-05 DIAGNOSIS — M25.512 CHRONIC PAIN OF BOTH SHOULDERS: ICD-10-CM

## 2023-09-05 DIAGNOSIS — E78.5 DM TYPE 2 WITH DIABETIC DYSLIPIDEMIA: ICD-10-CM

## 2023-09-05 PROCEDURE — 99215 OFFICE O/P EST HI 40 MIN: CPT | Mod: PBBFAC,PO,25 | Performed by: FAMILY MEDICINE

## 2023-09-05 PROCEDURE — 99999 PR PBB SHADOW E&M-EST. PATIENT-LVL V: CPT | Mod: PBBFAC,,, | Performed by: FAMILY MEDICINE

## 2023-09-05 PROCEDURE — 99999PBSHW FLU VACCINE - QUADRIVALENT - ADJUVANTED: ICD-10-PCS | Mod: PBBFAC,,,

## 2023-09-05 PROCEDURE — 99215 PR OFFICE/OUTPT VISIT, EST, LEVL V, 40-54 MIN: ICD-10-PCS | Mod: S$PBB,,, | Performed by: FAMILY MEDICINE

## 2023-09-05 PROCEDURE — 99999 PR PBB SHADOW E&M-EST. PATIENT-LVL V: ICD-10-PCS | Mod: PBBFAC,,, | Performed by: FAMILY MEDICINE

## 2023-09-05 PROCEDURE — 99999PBSHW FLU VACCINE - QUADRIVALENT - ADJUVANTED: Mod: PBBFAC,,,

## 2023-09-05 PROCEDURE — G0008 ADMIN INFLUENZA VIRUS VAC: HCPCS | Mod: PBBFAC,PO

## 2023-09-05 PROCEDURE — 99215 OFFICE O/P EST HI 40 MIN: CPT | Mod: S$PBB,,, | Performed by: FAMILY MEDICINE

## 2023-09-05 RX ORDER — PEN NEEDLE, DIABETIC 29 G X1/2"
1 NEEDLE, DISPOSABLE MISCELLANEOUS NIGHTLY
Qty: 100 EACH | Refills: 1 | Status: SHIPPED | OUTPATIENT
Start: 2023-09-05

## 2023-09-05 RX ORDER — ESCITALOPRAM OXALATE 5 MG/1
5 TABLET ORAL DAILY
Qty: 30 TABLET | Refills: 11 | Status: SHIPPED | OUTPATIENT
Start: 2023-09-05 | End: 2023-10-12

## 2023-09-05 RX ORDER — INSULIN DETEMIR 100 [IU]/ML
5 INJECTION, SOLUTION SUBCUTANEOUS NIGHTLY
Qty: 3 ML | Refills: 3 | Status: SHIPPED | OUTPATIENT
Start: 2023-09-05 | End: 2024-05-02

## 2023-09-05 NOTE — PROGRESS NOTES
Subjective:       Patient ID: Whit Sloan is a 83 y.o. female.    Chief Complaint: Anxiety, Shoulder Pain, Diabetes, and Hypertension  82 yo female with HTN, CKD, Stage 4, bilateral hearing loss, DM, Type 2, hyperlipidemia, and h/o right breast CA presents for f/u of her chronic conditions.Pt is accompanied to the visit by her daughter.  Pt has h/o MDD. Was previously on Citalopram.  Pt is followed by Dr. Angie Sprague for CKD, Stage 4. Last visit 7/20/23.  Anxiety  Presents for follow-up visit. Symptoms include depressed mood, excessive worry, irritability and nervous/anxious behavior. The quality of sleep is poor. Nighttime awakenings: one to two.       Shoulder Pain   This is a new problem. The current episode started more than 1 month ago. There has been no history of extremity trauma. The pain is at a severity of 7/10. The pain is severe. Associated symptoms include a limited range of motion and stiffness. Pertinent negatives include no fever. The symptoms are aggravated by activity. She has tried acetaminophen and OTC ointments for the symptoms. The treatment provided moderate relief. Her past medical history is significant for diabetes.   Diabetes  She presents for her follow-up diabetic visit. She has type 2 diabetes mellitus. Hypoglycemia symptoms include nervousness/anxiousness. Associated symptoms include blurred vision. Pertinent negatives for diabetes include no polydipsia, no polyphagia and no polyuria.   Hypertension  This is a chronic problem. The current episode started more than 1 year ago. The problem is unchanged. The problem is controlled. Associated symptoms include anxiety and blurred vision.   Has glaucoma and is followed by Dr. Lee. Last visit July 2023. Will f/u October 2023.  Fasting BGs have been elevated. Ranging from 155-200.  Results for orders placed or performed in visit on 05/02/23   Comprehensive Metabolic Panel   Result Value Ref Range    Glucose 167 (H) 65 - 99 mg/dL    BUN  24 7 - 25 mg/dL    Creatinine 1.42 (H) 0.60 - 0.95 mg/dL    eGFR 36 (L) > OR = 60 mL/min/1.73m2    BUN/Creatinine Ratio 17 6 - 22 (calc)    Sodium 137 135 - 146 mmol/L    Potassium 5.1 3.5 - 5.3 mmol/L    Chloride 105 98 - 110 mmol/L    CO2 25 20 - 32 mmol/L    Calcium 9.8 8.6 - 10.4 mg/dL    Total Protein 7.3 6.1 - 8.1 g/dL    Albumin 3.9 3.6 - 5.1 g/dL    Globulin, Total 3.4 1.9 - 3.7 g/dL (calc)    Albumin/Globulin Ratio 1.1 1.0 - 2.5 (calc)    Total Bilirubin 0.6 0.2 - 1.2 mg/dL    Alkaline Phosphatase 84 37 - 153 U/L    AST 16 10 - 35 U/L    ALT 8 6 - 29 U/L   Hemoglobin A1C   Result Value Ref Range    Hemoglobin A1C 8.2 (H) <5.7 % of total Hgb      Review of Systems   Constitutional:  Positive for irritability. Negative for fever.   Eyes:  Positive for blurred vision.   Endocrine: Negative for polydipsia, polyphagia and polyuria.   Musculoskeletal:  Positive for stiffness.   Psychiatric/Behavioral:  The patient is nervous/anxious.        Objective:      Physical Exam  Vitals reviewed.   Constitutional:       General: She is not in acute distress.     Appearance: Normal appearance. She is not ill-appearing.   HENT:      Head: Normocephalic and atraumatic.      Right Ear: External ear normal.      Left Ear: External ear normal.   Eyes:      Extraocular Movements: Extraocular movements intact.   Neck:      Vascular: No carotid bruit.   Cardiovascular:      Rate and Rhythm: Normal rate and regular rhythm.      Heart sounds: Normal heart sounds. No murmur heard.     No gallop.   Pulmonary:      Effort: Pulmonary effort is normal.      Breath sounds: Normal breath sounds. No wheezing or rales.   Abdominal:      General: Bowel sounds are normal.      Palpations: Abdomen is soft. There is no mass.      Tenderness: There is no abdominal tenderness.   Musculoskeletal:      Right shoulder: Tenderness present. No deformity or bony tenderness. Decreased range of motion. Normal strength. Normal pulse.      Left shoulder:  "Tenderness present. No deformity or bony tenderness. Decreased range of motion. Normal strength. Normal pulse.      Cervical back: Normal range of motion and neck supple. No rigidity or tenderness.   Neurological:      Mental Status: She is alert.   Psychiatric:         Attention and Perception: Attention and perception normal.         Mood and Affect: Mood is depressed.         Speech: Speech normal.         Behavior: Behavior normal. Behavior is cooperative.         Assessment:         See plan  Plan:       Hypertensive kidney disease with chronic kidney disease stage IV: Stable  - Continue f/u with Dr. Sprague.    DM type 2 with diabetic dyslipidemia: Uncontrolled DM, Type 2  -     Ambulatory referral/consult to Diabetes Education; Future; Expected date: 09/12/2023    Uncontrolled type 2 diabetes mellitus with hyperglycemia  -     Ambulatory referral/consult to Diabetes Education; Future; Expected date: 09/12/2023  -    Start  insulin detemir U-100, Levemir, (LEVEMIR FLEXPEN) 100 unit/mL (3 mL) InPn pen; Inject 5 Units into the skin every evening.  Dispense: 3 mL; Refill: 3  -     pen needle, diabetic 29 gauge x 1/2" Ndle; 1 Needle by Misc.(Non-Drug; Combo Route) route every evening.  Dispense: 100 each; Refill: 1  - Continue Glipizide and Januvia    Type 2 diabetes mellitus with stage 4 chronic kidney disease, without long-term current use of insulin: Uncontrolled    BMI 24.0-24.9, adult: Stable    Current moderate episode of major depressive disorder, unspecified whether recurrent  --    Start EScitalopram oxalate (LEXAPRO) 5 MG Tab; Take 1 tablet (5 mg total) by mouth once daily.  Dispense: 30 tablet; Refill: 11    Chronic pain of both shoulders  -     Ambulatory referral/consult to Orthopedics; Future; Expected date: 09/12/2023  -     Ambulatory referral/consult to Physical/Occupational Therapy; Future; Expected date: 09/12/2023    Need for influenza vaccination  -     Influenza (FLUAD) - Quadrivalent " (Adjuvanted) *Preferred* (65+) (PF)        Anxiety  -    Start EScitalopram oxalate (LEXAPRO) 5 MG Tab; Take 1 tablet (5 mg total) by mouth once daily.  Dispense: 30 tablet; Refill: 11         F/U in 4 -5 weeks    I spent a total of 50 minutes on the day of the visit.This includes face to face time and non-face to face time preparing to see the patient (eg, review of tests), obtaining and/or reviewing separately obtained history, documenting clinical information in the electronic or other health record, independently interpreting results and communicating results to the patient/family/caregiver, or care coordinator.

## 2023-09-25 ENCOUNTER — TELEPHONE (OUTPATIENT)
Dept: ORTHOPEDICS | Facility: CLINIC | Age: 83
End: 2023-09-25
Payer: MEDICARE

## 2023-09-25 DIAGNOSIS — M25.511 BILATERAL SHOULDER PAIN, UNSPECIFIED CHRONICITY: Primary | ICD-10-CM

## 2023-09-25 DIAGNOSIS — M25.512 BILATERAL SHOULDER PAIN, UNSPECIFIED CHRONICITY: Primary | ICD-10-CM

## 2023-09-25 NOTE — TELEPHONE ENCOUNTER
Spoke with with the pt and she's aware that x-rays are needed prior to seeing the provider. Pt v/u

## 2023-10-02 ENCOUNTER — TELEPHONE (OUTPATIENT)
Dept: FAMILY MEDICINE | Facility: CLINIC | Age: 83
End: 2023-10-02
Payer: MEDICARE

## 2023-10-02 ENCOUNTER — OFFICE VISIT (OUTPATIENT)
Dept: ORTHOPEDICS | Facility: CLINIC | Age: 83
End: 2023-10-02
Payer: MEDICARE

## 2023-10-02 DIAGNOSIS — Z85.3 HISTORY OF BREAST CANCER: ICD-10-CM

## 2023-10-02 DIAGNOSIS — M25.511 CHRONIC PAIN OF BOTH SHOULDERS: ICD-10-CM

## 2023-10-02 DIAGNOSIS — G89.29 CHRONIC PAIN OF BOTH SHOULDERS: ICD-10-CM

## 2023-10-02 DIAGNOSIS — M25.512 CHRONIC PAIN OF BOTH SHOULDERS: ICD-10-CM

## 2023-10-02 DIAGNOSIS — R91.1 SOLITARY PULMONARY NODULE: Primary | ICD-10-CM

## 2023-10-02 PROCEDURE — 99999 PR PBB SHADOW E&M-EST. PATIENT-LVL III: ICD-10-PCS | Mod: PBBFAC,,,

## 2023-10-02 PROCEDURE — 99213 OFFICE O/P EST LOW 20 MIN: CPT | Mod: PBBFAC,PN

## 2023-10-02 PROCEDURE — 99214 OFFICE O/P EST MOD 30 MIN: CPT | Mod: S$PBB,,,

## 2023-10-02 PROCEDURE — 99999 PR PBB SHADOW E&M-EST. PATIENT-LVL III: CPT | Mod: PBBFAC,,,

## 2023-10-02 PROCEDURE — 99214 PR OFFICE/OUTPT VISIT, EST, LEVL IV, 30-39 MIN: ICD-10-PCS | Mod: S$PBB,,,

## 2023-10-02 RX ORDER — METHOCARBAMOL 500 MG/1
500 TABLET, FILM COATED ORAL 4 TIMES DAILY
Qty: 40 TABLET | Refills: 0 | Status: SHIPPED | OUTPATIENT
Start: 2023-10-02 | End: 2023-10-12

## 2023-10-02 RX ORDER — BRINZOLAMIDE/BRIMONIDINE TARTRATE 10; 2 MG/ML; MG/ML
1 SUSPENSION/ DROPS OPHTHALMIC 3 TIMES DAILY
COMMUNITY
Start: 2023-08-18 | End: 2023-10-12

## 2023-10-02 RX ORDER — TIMOLOL MALEATE 5 MG/ML
SOLUTION/ DROPS OPHTHALMIC
COMMUNITY
Start: 2023-09-29

## 2023-10-02 RX ORDER — FERROUS SULFATE 325(65) MG
1 TABLET ORAL
COMMUNITY
Start: 2023-05-05 | End: 2024-05-04

## 2023-10-02 NOTE — PROGRESS NOTES
Patient ID: Whit Sloan is a 83 y.o. female    Pain of the Left Shoulder and Pain of the Right Shoulder      History of Present Illness:    Whit Sloan with past medical history CKD and breast cancer presents to clinic for bilateral shoulder pain, L > R. Patient denies known PRAVEEN. The pain started 2 months ago and is becoming progressively worse.  Pain is located over (points to) anterior, posterior, and lateral bilateral shoulder. She reports that the pain is a 7 /10 aching pain toda. The pain is affecting ADLs and limiting desired level of activity. Denies numbness, tingling, radiation and inability to bear weight.    She is taken Tylenol Arthritis with improvement.  She is also tried heat, massage and the exercise bike.  She reports pain located to her shoulders and extends to her neck.  She has never tried injections.    Occupation: retired    Ambulating:  Unassisted  Diabetic: +   Lab Results   Component Value Date    HGBA1C 8.2 (H) 2023     Smoking: past  Hx of DVT/PE: no     PAST MEDICAL HISTORY:   Past Medical History:   Diagnosis Date    Breast cancer 2016    Right breast    CKD (chronic kidney disease) stage 3, GFR 30-59 ml/min     Depressive disorder, not elsewhere classified     Disorder of bone and cartilage, unspecified     Hypertension     Malignant neoplasm of lower-inner quadrant of right female breast 2016    Other and unspecified hyperlipidemia     Recurrent nephrolithiasis     Type II or unspecified type diabetes mellitus without mention of complication, uncontrolled      PAST SURGICAL HISTORY:   Past Surgical History:   Procedure Laterality Date    BREAST BIOPSY Right     BREAST LUMPECTOMY Right     HYSTERECTOMY      one ovary/left    OOPHORECTOMY      one     FAMILY HISTORY:   Family History   Problem Relation Age of Onset    Heart disease Mother     Diabetes Mother     Diabetes Sister         2  with diabetes/amputation    Cancer Brother         lung  "    SOCIAL HISTORY:   Social History     Occupational History    Not on file   Tobacco Use    Smoking status: Former     Current packs/day: 0.00     Types: Cigarettes     Quit date: 1994     Years since quittin.1    Smokeless tobacco: Never   Substance and Sexual Activity    Alcohol use: No    Drug use: No    Sexual activity: Not Currently        MEDICATIONS:   Current Outpatient Medications:     allopurinoL (ZYLOPRIM) 100 MG tablet, Take 1 tablet by mouth once daily., Disp: , Rfl:     diclofenac sodium (VOLTAREN) 1 % Gel, Apply 1 inch topically., Disp: , Rfl:     dorzolamide-timolol 2-0.5% (COSOPT) 22.3-6.8 mg/mL ophthalmic solution, Place 1 drop into the right eye 2 (two) times daily., Disp: , Rfl:     EScitalopram oxalate (LEXAPRO) 5 MG Tab, Take 1 tablet (5 mg total) by mouth once daily., Disp: 30 tablet, Rfl: 11    ferrous sulfate (FEOSOL) 325 mg (65 mg iron) Tab tablet, Take 1 tablet by mouth every Mon, Wed, Fri., Disp: , Rfl:     finerenone (KERENDIA) 10 mg Tab, Take 10 mg by mouth., Disp: , Rfl:     glipiZIDE (GLUCOTROL) 10 MG tablet, TAKE 1 TABLET BY MOUTH TWICE A DAY BEFORE MEALS, Disp: 180 tablet, Rfl: 0    insulin detemir U-100, Levemir, (LEVEMIR FLEXPEN) 100 unit/mL (3 mL) InPn pen, Inject 5 Units into the skin every evening., Disp: 3 mL, Rfl: 3    JANUVIA 50 mg Tab, TAKE 1 TABLET BY MOUTH EVERY DAY, Disp: 90 tablet, Rfl: 1    latanoprost 0.005 % ophthalmic solution, Place 1 drop into the right eye nightly., Disp: , Rfl:     pen needle, diabetic 29 gauge x 1/2" Ndle, 1 Needle by Misc.(Non-Drug; Combo Route) route every evening., Disp: 100 each, Rfl: 1    simvastatin (ZOCOR) 40 MG tablet, TAKE 1 TABLET BY MOUTH EVERY DAY IN THE EVENING, Disp: 90 tablet, Rfl: 0    vitamin D (VITAMIN D3) 1000 units Tab, Take 2,000 Units by mouth., Disp: , Rfl:     methocarbamoL (ROBAXIN) 500 MG Tab, Take 1 tablet (500 mg total) by mouth 4 (four) times daily. for 10 days, Disp: 40 tablet, Rfl: 0    SIMBRINZA 1-0.2 " % DrpS, Place 1 drop into both eyes 3 (three) times daily., Disp: , Rfl:     timolol maleate 0.5% (TIMOPTIC) 0.5 % Drop, , Disp: , Rfl:   ALLERGIES: Review of patient's allergies indicates:  No Known Allergies      Physical Exam     There were no vitals filed for this visit.  Alert and oriented to person, place and time. No acute distress. Well-groomed, not ill appearing. Pupils round and reactive, normal respiratory effort, no audible wheezing.     Shoulder / Upper Extremity Exam         No obvious masses, deformity or abrasions noted to bilateral shoulder.  There is a small mobile mass noted to her left scapula.  Forward flexion active 100, passive 120.  Positive Palencia, positive Neer's.  External rotation 40.       Imaging:     Bilateral shoulder x-rays show no evidence of acute fracture or dislocation.  Bilateral cystic changes within the greater tuberosity suggestive of rotator cuff pathology.  Also incidental finding of left perihilar mass 7 cm.    Assessment & Plan    Chronic pain of both shoulders  -     Ambulatory referral/consult to Orthopedics  -     Ambulatory referral/consult to Physical/Occupational Therapy; Future; Expected date: 10/09/2023  -     methocarbamoL (ROBAXIN) 500 MG Tab; Take 1 tablet (500 mg total) by mouth 4 (four) times daily. for 10 days  Dispense: 40 tablet; Refill: 0         I made the decision to obtain old records of the patient including previous notes and imaging. New imaging was ordered today of the extremity or extremities evaluated. I independently reviewed and interpreted the radiographs and/or MRIs/CT scan today as well as prior imaging.    We discussed at length different treatment options including conservative vs surgical management. These include anti-inflammatories, acetaminophen, rest, ice, heat, formal physical therapy including strengthening and stretching exercises, home exercise programs, injections, dry needling, and finally surgical intervention.      Patient  with chronic b/l shoulder pain. Incidental finding of left perihilar mass on xray noted. She has an appt with her PCP next week.  She was instructed to follow up with her primary care/heme Onc for this.  A message will also be sent to her PCP.  Regarding her shoulders, no NSAIDs.  May continue Tylenol Arthritis.  We will also get her into some physical therapy.  PT order placed, patient responsible to establish and continue care.  Robaxin Rx sent, may cause drowsiness.  Her caretaker was instructed to be monitoring her while she is taking this medication and to only take 1 pill at night.  We will defer injections as A1c above 8.    Follow up:  P.r.n.  X-rays next visit: none    All questions were answered and patient is agreeable to the above plan.

## 2023-10-02 NOTE — TELEPHONE ENCOUNTER
----- Message from Ashley Marshall PA-C sent at 10/2/2023  4:09 PM CDT -----  Regarding: Lung Mass  Hi Ms. Meyers, I am an ortho PA over at [facility]. I saw Ms. Sloan for b/l shoulder pain. For each patient, I require xrays which for her incidentally showed a perihilar lung mass, she has hx breast ca. I do think this is a relatively new finding as the cxr in 2019 did not show this. She has a follow up with you next week, but wanted to give you a heads up! I told her about the finding as well so she is aware.

## 2023-10-03 ENCOUNTER — TELEPHONE (OUTPATIENT)
Dept: FAMILY MEDICINE | Facility: CLINIC | Age: 83
End: 2023-10-03
Payer: MEDICARE

## 2023-10-03 DIAGNOSIS — Z12.31 SCREENING MAMMOGRAM, ENCOUNTER FOR: Primary | ICD-10-CM

## 2023-10-04 DIAGNOSIS — E78.5 DM TYPE 2 WITH DIABETIC DYSLIPIDEMIA: ICD-10-CM

## 2023-10-04 DIAGNOSIS — E11.69 DM TYPE 2 WITH DIABETIC DYSLIPIDEMIA: ICD-10-CM

## 2023-10-04 DIAGNOSIS — E11.22 TYPE 2 DIABETES MELLITUS WITH STAGE 4 CHRONIC KIDNEY DISEASE, WITHOUT LONG-TERM CURRENT USE OF INSULIN: ICD-10-CM

## 2023-10-04 DIAGNOSIS — N18.4 TYPE 2 DIABETES MELLITUS WITH STAGE 4 CHRONIC KIDNEY DISEASE, WITHOUT LONG-TERM CURRENT USE OF INSULIN: ICD-10-CM

## 2023-10-04 RX ORDER — SIMVASTATIN 40 MG/1
TABLET, FILM COATED ORAL
Qty: 90 TABLET | Refills: 1 | Status: SHIPPED | OUTPATIENT
Start: 2023-10-04

## 2023-10-04 RX ORDER — SITAGLIPTIN 50 MG/1
TABLET, FILM COATED ORAL
Qty: 90 TABLET | Refills: 1 | Status: SHIPPED | OUTPATIENT
Start: 2023-10-04

## 2023-10-04 RX ORDER — GLIPIZIDE 10 MG/1
TABLET ORAL
Qty: 180 TABLET | Refills: 1 | Status: SHIPPED | OUTPATIENT
Start: 2023-10-04

## 2023-10-04 NOTE — TELEPHONE ENCOUNTER
No care due was identified.  [facility] Embedded Care Due Messages. Reference number: [id number].   10/04/2023 3:19:54 PM CDT

## 2023-10-04 NOTE — TELEPHONE ENCOUNTER
Refill Routing Note   Medication(s) are not appropriate for processing by [facility] for the following reason(s):      Required labs abnormal: Patient may benefit from dose adjustment due to renal impairment -- recommended dose 25 mg QD    [facility] action(s):  Defer  Approve Care Due:  None identified   Glipizide: No dose adjustment recommended per renal fxn.    Pharmacist review requested: Yes   Extended chart review required: Yes     Appointments  past 12m or future 3m with PCP    Date Provider   Last Visit   9/5/2023 Ingrid Meyers A.M., MD   Next Visit   10/11/2023 Ingrid Meyers A.M., MD   ED visits in past 90 days: 0        Note composed:5:17 PM 10/04/2023

## 2023-10-04 NOTE — TELEPHONE ENCOUNTER
Refill Routing Note   Medication(s) are not appropriate for processing by [facility] for the following reason(s):      Required labs abnormal: Januvia, Glucotrol  CREATININE 1.42 (H) 08/30/2023        EGFRNORACEVR 36 (L) 08/30/2023   Drug-disease interaction: Simvastatin    [facility] action(s):  Defer Care Due:  None identified     Medication Therapy Plan: Drug-Disease: simvastatin and Hypertensive kidney disease with chronic kidney disease stage IV; Type 2 diabetes mellitus with stage 4 chronic kidney disease, without long-term current use of insulin      Pharmacist review requested: Yes     Appointments  past 12m or future 3m with PCP    Date Provider   Last Visit   9/5/2023 Ingrid Meyers A.M., MD   Next Visit   10/11/2023 Ingrid Meyers A.M., MD   ED visits in past 90 days: 0        Note composed:5:02 PM 10/04/2023

## 2023-10-06 ENCOUNTER — HOSPITAL ENCOUNTER (OUTPATIENT)
Dept: RADIOLOGY | Facility: HOSPITAL | Age: 83
Discharge: HOME | End: 2023-10-06
Attending: FAMILY MEDICINE
Payer: MEDICARE

## 2023-10-06 DIAGNOSIS — Z12.31 SCREENING MAMMOGRAM, ENCOUNTER FOR: ICD-10-CM

## 2023-10-06 PROCEDURE — 77067 SCR MAMMO BI INCL CAD: CPT | Mod: TC

## 2023-10-06 PROCEDURE — 77067 SCR MAMMO BI INCL CAD: CPT | Mod: 26,,, | Performed by: RADIOLOGY

## 2023-10-06 PROCEDURE — 77067 MAMMO DIGITAL SCREENING BILAT WITH TOMO: ICD-10-PCS | Mod: 26,,, | Performed by: RADIOLOGY

## 2023-10-06 PROCEDURE — 77063 MAMMO DIGITAL SCREENING BILAT WITH TOMO: ICD-10-PCS | Mod: 26,,, | Performed by: RADIOLOGY

## 2023-10-06 PROCEDURE — 77063 BREAST TOMOSYNTHESIS BI: CPT | Mod: 26,,, | Performed by: RADIOLOGY

## 2023-10-10 ENCOUNTER — TELEPHONE (OUTPATIENT)
Dept: FAMILY MEDICINE | Facility: CLINIC | Age: 83
End: 2023-10-10
Payer: MEDICARE

## 2023-10-12 ENCOUNTER — OFFICE VISIT (OUTPATIENT)
Dept: PRIMARY CARE CLINIC | Facility: CLINIC | Age: 83
End: 2023-10-12
Payer: MEDICARE

## 2023-10-12 VITALS
HEART RATE: 90 BPM | DIASTOLIC BLOOD PRESSURE: 60 MMHG | BODY MASS INDEX: 24.22 KG/M2 | HEIGHT: 64 IN | TEMPERATURE: 98 F | OXYGEN SATURATION: 95 % | RESPIRATION RATE: 16 BRPM | WEIGHT: 141.88 LBS | SYSTOLIC BLOOD PRESSURE: 110 MMHG

## 2023-10-12 DIAGNOSIS — R91.1 PULMONARY NODULE 1 CM OR GREATER IN DIAMETER: ICD-10-CM

## 2023-10-12 DIAGNOSIS — E78.5 DM TYPE 2 WITH DIABETIC DYSLIPIDEMIA: ICD-10-CM

## 2023-10-12 DIAGNOSIS — D17.1 LIPOMA OF BACK: ICD-10-CM

## 2023-10-12 DIAGNOSIS — N18.32 CHRONIC RENAL FAILURE, STAGE 3B: ICD-10-CM

## 2023-10-12 DIAGNOSIS — Z76.89 ENCOUNTER TO ESTABLISH CARE: Primary | ICD-10-CM

## 2023-10-12 DIAGNOSIS — E11.69 DM TYPE 2 WITH DIABETIC DYSLIPIDEMIA: ICD-10-CM

## 2023-10-12 DIAGNOSIS — R91.1 SOLITARY PULMONARY NODULE: ICD-10-CM

## 2023-10-12 PROCEDURE — 99214 OFFICE O/P EST MOD 30 MIN: CPT | Mod: S$PBB,,, | Performed by: STUDENT IN AN ORGANIZED HEALTH CARE EDUCATION/TRAINING PROGRAM

## 2023-10-12 PROCEDURE — 99215 OFFICE O/P EST HI 40 MIN: CPT | Mod: PBBFAC,PN | Performed by: STUDENT IN AN ORGANIZED HEALTH CARE EDUCATION/TRAINING PROGRAM

## 2023-10-12 PROCEDURE — 99999 PR PBB SHADOW E&M-EST. PATIENT-LVL V: CPT | Mod: PBBFAC,,, | Performed by: STUDENT IN AN ORGANIZED HEALTH CARE EDUCATION/TRAINING PROGRAM

## 2023-10-12 PROCEDURE — 99214 PR OFFICE/OUTPT VISIT, EST, LEVL IV, 30-39 MIN: ICD-10-PCS | Mod: S$PBB,,, | Performed by: STUDENT IN AN ORGANIZED HEALTH CARE EDUCATION/TRAINING PROGRAM

## 2023-10-12 PROCEDURE — 99999 PR PBB SHADOW E&M-EST. PATIENT-LVL V: ICD-10-PCS | Mod: PBBFAC,,, | Performed by: STUDENT IN AN ORGANIZED HEALTH CARE EDUCATION/TRAINING PROGRAM

## 2023-10-12 NOTE — PROGRESS NOTES
Subjective:           Patient ID: Whit Sloan is a 83 y.o. female who presents today with a chief complaint of [facility] [place] [facility]  .    Chief Complaint:   [facility] [place] [facility]      History of Present Illness:    Whit Sloan is a 83 y.o. female who presents today with a chief complaint of Establish Care  .    Patient presenting to [facility].  Has her daughter with her.  States they are changing providers to get a CT scan, there is one in the chart as of 10/2/23.     Was started on Lexapro 5mg at last appt with previous PCP, but did not tolerate well.  Daughter states was making patient very tired and stopped taking the medication.     Last A1c was 8.2%, on 8/30/23.  Was started on Levemir 5u in the evening.  States that glucose this AM was 113, yesterday was 114.      Had been on Metformin years ago, but not for a time.   Glipizide 10mg daily and Januvia 50mg.      Review of Systems   Constitutional:  Positive for fatigue. Negative for activity change, fever and unexpected weight change.   HENT:  Negative for congestion, nosebleeds, sinus pressure and sneezing.    Respiratory:  Positive for shortness of breath. Negative for cough and wheezing.    Cardiovascular:  Negative for chest pain, palpitations and leg swelling.   Gastrointestinal:  Negative for abdominal distention, constipation, diarrhea and nausea.   Genitourinary:  Negative for difficulty urinating and dysuria.   Musculoskeletal:  Positive for arthralgias and joint swelling. Negative for gait problem.   Skin:  Negative for pallor and rash.        Skin lesions   Neurological:  Negative for weakness, numbness and headaches.   Psychiatric/Behavioral:  Positive for sleep disturbance. Negative for agitation. The patient is nervous/anxious.            Objective:        Vitals:    10/12/23 1405   BP: 110/60   BP Location: Right arm   Patient Position: Sitting   BP Method: Medium (Manual)   Pulse: 90   Resp: 16   Temp: 97.8 °F (36.6 °C)   TempSrc: Temporal   SpO2:  "95%   Weight: 64.4 kg (141 lb 13.9 oz)   Height: 5' 4" (1.626 m)       Body mass index is 24.35 kg/m².      Physical Exam  Constitutional:       General: She is not in acute distress.     Appearance: Normal appearance.   HENT:      Head: Normocephalic and atraumatic.      Right Ear: External ear normal.      Left Ear: External ear normal.      Nose: No rhinorrhea.      Mouth/Throat:      Mouth: Mucous membranes are moist.   Eyes:      Extraocular Movements: Extraocular movements intact.      Conjunctiva/sclera: Conjunctivae normal.   Cardiovascular:      Rate and Rhythm: Normal rate and regular rhythm.      Pulses: Normal pulses.      Heart sounds: No murmur heard.  Pulmonary:      Effort: Pulmonary effort is normal. No respiratory distress.   Musculoskeletal:         General: Tenderness (shoulder pain) present.      Right lower leg: No edema.      Left lower leg: No edema.   Skin:     Capillary Refill: Capillary refill takes less than 2 seconds.      Coloration: Skin is not jaundiced.      Findings: Lesion present. No bruising.   Neurological:      General: No focal deficit present.      Mental Status: She is alert and oriented to person, place, and time.      Motor: No weakness.      Gait: Gait normal.   Psychiatric:         Mood and Affect: Mood normal.             Lab Results   Component Value Date     08/30/2023    K 5.1 08/30/2023     08/30/2023    CO2 25 08/30/2023    BUN 24 08/30/2023    CREATININE 1.42 (H) 08/30/2023    ANIONGAP 7 (L) 10/17/2022     Lab Results   Component Value Date    HGBA1C 8.2 (H) 08/30/2023     No results found for: "BNP", "BNPTRIAGEBLO"    Lab Results   Component Value Date    WBC 5.84 10/20/2020    HGB 11.5 (L) 10/20/2020    HCT 38.4 10/20/2020     10/20/2020    GRAN 3.5 10/20/2020    GRAN 59.6 10/20/2020     Lab Results   Component Value Date    CHOL 167 03/27/2023    HDL 72 03/27/2023    LDLCALC 81 03/27/2023    TRIG 56 03/27/2023          Current Outpatient " "Medications:     allopurinoL (ZYLOPRIM) 100 MG tablet, Take 1 tablet by mouth once daily., Disp: , Rfl:     diclofenac sodium (VOLTAREN) 1 % Gel, Apply 1 inch topically., Disp: , Rfl:     dorzolamide-timolol 2-0.5% (COSOPT) 22.3-6.8 mg/mL ophthalmic solution, Place 1 drop into the right eye 2 (two) times daily., Disp: , Rfl:     ferrous sulfate (FEOSOL) 325 mg (65 mg iron) Tab tablet, Take 1 tablet by mouth every Mon, Wed, Fri., Disp: , Rfl:     finerenone (KERENDIA) 10 mg Tab, Take 10 mg by mouth., Disp: , Rfl:     glipiZIDE (GLUCOTROL) 10 MG tablet, TAKE 1 TABLET BY MOUTH TWICE A DAY BEFORE MEALS, Disp: 180 tablet, Rfl: 1    insulin detemir U-100, Levemir, (LEVEMIR FLEXPEN) 100 unit/mL (3 mL) InPn pen, Inject 5 Units into the skin every evening., Disp: 3 mL, Rfl: 3    JANUVIA 50 mg Tab, TAKE 1 TABLET BY MOUTH EVERY DAY, Disp: 90 tablet, Rfl: 1    latanoprost 0.005 % ophthalmic solution, Place 1 drop into the right eye nightly., Disp: , Rfl:     pen needle, diabetic 29 gauge x 1/2" Ndle, 1 Needle by Misc.(Non-Drug; Combo Route) route every evening., Disp: 100 each, Rfl: 1    simvastatin (ZOCOR) 40 MG tablet, TAKE 1 TABLET BY MOUTH EVERY DAY IN THE EVENING, Disp: 90 tablet, Rfl: 1    timolol maleate 0.5% (TIMOPTIC) 0.5 % Drop, , Disp: , Rfl:     vitamin D (VITAMIN D3) 1000 units Tab, Take 2,000 Units by mouth., Disp: , Rfl:      Outpatient Encounter Medications as of 10/12/2023   Medication Sig Dispense Refill    allopurinoL (ZYLOPRIM) 100 MG tablet Take 1 tablet by mouth once daily.      diclofenac sodium (VOLTAREN) 1 % Gel Apply 1 inch topically.      dorzolamide-timolol 2-0.5% (COSOPT) 22.3-6.8 mg/mL ophthalmic solution Place 1 drop into the right eye 2 (two) times daily.      ferrous sulfate (FEOSOL) 325 mg (65 mg iron) Tab tablet Take 1 tablet by mouth every Mon, Wed, Fri.      finerenone (KERENDIA) 10 mg Tab Take 10 mg by mouth.      glipiZIDE (GLUCOTROL) 10 MG tablet TAKE 1 TABLET BY MOUTH TWICE A DAY BEFORE " "MEALS 180 tablet 1    insulin detemir U-100, Levemir, (LEVEMIR FLEXPEN) 100 unit/mL (3 mL) InPn pen Inject 5 Units into the skin every evening. 3 mL 3    JANUVIA 50 mg Tab TAKE 1 TABLET BY MOUTH EVERY DAY 90 tablet 1    latanoprost 0.005 % ophthalmic solution Place 1 drop into the right eye nightly.      [] methocarbamoL (ROBAXIN) 500 MG Tab Take 1 tablet (500 mg total) by mouth 4 (four) times daily. for 10 days 40 tablet 0    pen needle, diabetic 29 gauge x 1/2" Ndle 1 Needle by Misc.(Non-Drug; Combo Route) route every evening. 100 each 1    simvastatin (ZOCOR) 40 MG tablet TAKE 1 TABLET BY MOUTH EVERY DAY IN THE EVENING 90 tablet 1    timolol maleate 0.5% (TIMOPTIC) 0.5 % Drop       vitamin D (VITAMIN D3) 1000 units Tab Take 2,000 Units by mouth.      [DISCONTINUED] EScitalopram oxalate (LEXAPRO) 5 MG Tab Take 1 tablet (5 mg total) by mouth once daily. 30 tablet 11    [DISCONTINUED] SIMBRINZA 1-0.2 % DrpS Place 1 drop into both eyes 3 (three) times daily.       No facility-administered encounter medications on file as of 10/12/2023.          Assessment:       1. Encounter to establish care    2. Pulmonary nodule 1 cm or greater in diameter    3. Lipoma of back    4. Solitary pulmonary nodule    5. Chronic renal failure, stage 3b    6. DM type 2 with diabetic dyslipidemia           Plan:       Encounter to establish care    Pulmonary nodule 1 cm or greater in diameter  -     X-Ray Chest PA And Lateral; Future; Expected date: 10/12/2023    Lipoma of back  -     X-Ray Chest PA And Lateral; Future; Expected date: 10/12/2023  -     US Soft Tissue Chest_Upper Back; Future; Expected date: 10/12/2023    Solitary pulmonary nodule  -     CT Chest Without Contrast; Future; Expected date: 10/12/2023  -     US Soft Tissue Chest_Upper Back; Future; Expected date: 10/12/2023    Chronic renal failure, stage 3b    DM type 2 with diabetic dyslipidemia                   Left Lung Lesion:     - 83-year-old female presents to " clinic to establish care today.  Has history of surviving breast cancer.   - had xray of left shoulder showing an incidental lesion on the left lung.   - has some SOB at this time, worse with exertion.   - has air movement in lungs throughout, but some bubbling on the right side.   - advised full CXR to visualize the lesion better.   - then CT chest to eval chest lesion afterward.   - would consider getting blood work before f/u appt in 4 weeks.     Left Back Lipoma:   - noted by daughter.   - on exam there are 2 noted lesions, 1 smaller 2-3cm round lesion that is well circumscribed, and a larger 6-7cm lesion more lateral that is more diffuse.    - not painful, not warm.  Soft.   - likely lipoma.   - will eval with US.    DM2:   - taking Glipizide 10mg BID, Januvia 50mg daily and Detemir 5u nightly.   - monitor, glucose and A1c.     - stopped metformin for unclear reason.    - tolerating insulin well.  Morning glucose in tight control range.

## 2023-10-12 NOTE — PATIENT INSTRUCTIONS
Left Lung Lesion:     - 83-year-old female presents to clinic to establish care today.  Has history of surviving breast cancer.   - had xray of left shoulder showing an incidental lesion on the left lung.   - has some SOB at this time, worse with exertion.   - has air movement in lungs throughout, but some bubbling on the right side.   - advised full CXR to visualize the lesion better.   - then CT chest to eval chest lesion afterward.   - would consider getting blood work before f/u appt in 4 weeks.     Left Back Lipoma:   - noted by daughter.   - on exam there are 2 noted lesions, 1 smaller 2-3cm round lesion that is well circumscribed, and a larger 6-7cm lesion more lateral that is more diffuse.    - not painful, not warm.  Soft.   - likely lipoma.   - will eval with US.

## 2023-10-18 ENCOUNTER — PATIENT MESSAGE (OUTPATIENT)
Dept: CARDIOLOGY | Facility: CLINIC | Age: 83
End: 2023-10-18
Payer: MEDICARE

## 2023-10-18 DIAGNOSIS — R91.8 LUNG MASS: Primary | ICD-10-CM

## 2023-10-18 DIAGNOSIS — N18.32 STAGE 3B CHRONIC KIDNEY DISEASE: ICD-10-CM

## 2023-10-20 ENCOUNTER — TELEPHONE (OUTPATIENT)
Dept: PRIMARY CARE CLINIC | Facility: CLINIC | Age: 83
End: 2023-10-20
Payer: MEDICARE

## 2023-10-20 DIAGNOSIS — R19.00 INTRA-ABDOMINAL AND PELVIC SWELLING, MASS AND LUMP, UNSPECIFIED SITE: Primary | ICD-10-CM

## 2023-10-20 DIAGNOSIS — R53.83 OTHER FATIGUE: ICD-10-CM

## 2023-10-20 DIAGNOSIS — R07.89 OTHER CHEST PAIN: ICD-10-CM

## 2023-10-20 DIAGNOSIS — R91.1 LESION OF RIGHT LUNG: ICD-10-CM

## 2023-10-20 NOTE — TELEPHONE ENCOUNTER
----- Message from Citlalli Frias sent at 10/20/2023  2:54 PM CDT -----  Contact: Patient, [phone]  2TESTRESULTS    Type: Test Results    What test was performed? CT Scab    Who ordered the test? Dr WESTON Montoya    When and where were the test performed? 10/18/2023    Would you like response via [employer]: Call back    Comments:Please call her. Thanks.

## 2023-10-23 ENCOUNTER — TELEPHONE (OUTPATIENT)
Dept: PRIMARY CARE CLINIC | Facility: CLINIC | Age: 83
End: 2023-10-23
Payer: MEDICARE

## 2023-10-23 NOTE — TELEPHONE ENCOUNTER
----- Message from Nhi Caldwell sent at 10/23/2023  9:25 AM CDT -----  Contact: self/[phone]  Patient is calling regarding a referral she has for consult to Pulmonology, she has some questions and some concerns. Please call back at [phone]. Thanks/ar     Letter faxed

## 2023-10-23 NOTE — TELEPHONE ENCOUNTER
Spoke to patient about orders put in by Dr. Montoya. CT scan of Abdomen without contrast and fasting labs.    Patient states understanding

## 2023-10-23 NOTE — TELEPHONE ENCOUNTER
----- Message from Malathi Hong sent at 10/23/2023  4:00 PM CDT -----  Contact: [phone] or [phone]  Patient  Patient is returning a phone call.  Who left a message for the patient: Noe Fox MA  Does patient know what this is regarding:    Would you like a call back, or a response through your [facility] portal?:   call   Comments:

## 2023-10-31 ENCOUNTER — OFFICE VISIT (OUTPATIENT)
Dept: PULMONOLOGY | Facility: CLINIC | Age: 83
End: 2023-10-31
Payer: MEDICARE

## 2023-10-31 VITALS
HEART RATE: 93 BPM | HEIGHT: 64 IN | OXYGEN SATURATION: 98 % | SYSTOLIC BLOOD PRESSURE: 131 MMHG | DIASTOLIC BLOOD PRESSURE: 67 MMHG | BODY MASS INDEX: 24.1 KG/M2 | WEIGHT: 141.13 LBS

## 2023-10-31 DIAGNOSIS — M79.89 SOFT TISSUE MASS: ICD-10-CM

## 2023-10-31 DIAGNOSIS — K86.89 PANCREATIC MASS: ICD-10-CM

## 2023-10-31 DIAGNOSIS — G89.3 NEOPLASM RELATED PAIN: ICD-10-CM

## 2023-10-31 DIAGNOSIS — I31.39 PERICARDIAL EFFUSION: ICD-10-CM

## 2023-10-31 DIAGNOSIS — R06.02 SHORTNESS OF BREATH: ICD-10-CM

## 2023-10-31 DIAGNOSIS — R91.8 LUNG MASS: Primary | ICD-10-CM

## 2023-10-31 DIAGNOSIS — R91.1 SOLID NODULE OF LUNG GREATER THAN 8 MM IN DIAMETER: ICD-10-CM

## 2023-10-31 PROCEDURE — 99215 OFFICE O/P EST HI 40 MIN: CPT | Mod: PBBFAC,PN | Performed by: INTERNAL MEDICINE

## 2023-10-31 PROCEDURE — 99205 OFFICE O/P NEW HI 60 MIN: CPT | Mod: S$PBB,,, | Performed by: INTERNAL MEDICINE

## 2023-10-31 PROCEDURE — 99205 PR OFFICE/OUTPT VISIT, NEW, LEVL V, 60-74 MIN: ICD-10-PCS | Mod: S$PBB,,, | Performed by: INTERNAL MEDICINE

## 2023-10-31 PROCEDURE — 99999 PR PBB SHADOW E&M-EST. PATIENT-LVL V: CPT | Mod: PBBFAC,,, | Performed by: INTERNAL MEDICINE

## 2023-10-31 PROCEDURE — 99999 PR PBB SHADOW E&M-EST. PATIENT-LVL V: ICD-10-PCS | Mod: PBBFAC,,, | Performed by: INTERNAL MEDICINE

## 2023-10-31 NOTE — H&P (VIEW-ONLY)
"History & Physical  [facility]    SUBJECTIVE:     Chief Complaint:   Lung mass    History of Present Illness:  Whit Sloan is a 83 y.o. female who presents for evaluation of a lung mass. Lung mass was found on a CT chest which was done to evaluate new symptoms. The pt has been experiencing new symptoms that include weight loss. Also having left neck/scapula pain x3 months. Also had two lumps on her upper back that showed up ~3 months ago. She is experiencing shortness of breath x1 month.    "I cough every now and then, but not much."     History of right breast cancer 15 diagnosed with stereotactic biopsy. Invasive ductal carcinoma, well-differentiated with mucinous features. [place] - Positive (100%, strong). [place] - Positive (100%, strong). She underwent surgical lumpectomy with negative SL. She then took tamoxifen for five years. No recurrence.    She smoked maybe 1/2 PPD from age 19 - 49.    PMH: CKD, DM2, breast cancer    Review of patient's allergies indicates:  No Known Allergies    Past Medical History:   Diagnosis Date    Breast cancer 2016    Right breast    CKD (chronic kidney disease) stage 3, GFR 30-59 ml/min     Depressive disorder, not elsewhere classified     Disorder of bone and cartilage, unspecified     Hypertension     Malignant neoplasm of lower-inner quadrant of right female breast 2016    Other and unspecified hyperlipidemia     Recurrent nephrolithiasis     Type II or unspecified type diabetes mellitus without mention of complication, uncontrolled      Past Surgical History:   Procedure Laterality Date    BREAST BIOPSY Right     BREAST LUMPECTOMY Right     HYSTERECTOMY      one ovary/left    OOPHORECTOMY      one     Family History   Problem Relation Age of Onset    Heart disease Mother     Diabetes Mother     Diabetes Sister         2  with diabetes/amputation    Cancer Brother         lung     Social History     Socioeconomic History    Marital status: " "   Tobacco Use    Smoking status: Former     Current packs/day: 0.00     Types: Cigarettes     Quit date: 1994     Years since quittin.2    Smokeless tobacco: Never   Substance and Sexual Activity    Alcohol use: No    Drug use: No    Sexual activity: Not Currently     Review of Systems:  See HPI    OBJECTIVE:     Vital Signs  Vitals:    10/31/23 1442   BP: 131/67   BP Location: Right arm   Patient Position: Sitting   BP Method: Small (Automatic)   Pulse: 93   SpO2: 98%  Comment: at rest on room air   Weight: 64 kg (141 lb 1.5 oz)   Height: 5' 4" (1.626 m)     Body mass index is 24.22 kg/m².    Physical Exam:  General: no distress  Eyes:  conjunctivae/corneas clear  Nose: no discharge  Neck: trachea midline with no masses appreciated  Lungs:  normal respiratory effort, no wheezes, no rales  Heart: regular rate and rhythm and no murmur  Abdomen: non-distended  Extremities: no cyanosis, no edema, no clubbing  Skin: No rashes or lesions. good skin turgor  Neurologic: alert, oriented, thought content appropriate    Laboratory:  Lab Results   Component Value Date    WBC 8.50 10/27/2023    HGB 10.9 (L) 10/27/2023    HCT 35.4 (L) 10/27/2023    MCV 86 10/27/2023     10/27/2023     Chest Imaging, My Impression:   CT chest 10/2023: There is a peripheral left upper lobe lung mass in the lingula measuring 7.7 x 7.9 cm. Small pericardial effusion with complex appearance. There is a spiculated 1.2 x 2.1 cm nodule in the right lower lobe.     CT abd/pelvis: There is a 3.2 cm by 1.8 cm hypodense lesion in the pancreatic head without pancreatic ductal dilatation. This may reflect cystic or solid mass.    ASSESSMENT/PLAN:     Left lung mass  - Discussed concern for malignancy with the patient. If excisional biopsy of the soft tissue mass is negative, then I will schedule the pt for bronchoscopic biopsy at [facility].  - Obtain pet/ct.    Soft tissue mass back  - Firm. Recommend excisional biospy. Refer to general " surgery.    Pancreatic lesion  - CA 19-9  - Follow up PET/CT.    Right lung nodule  - Obtain pet/ct.    Pain suspected to be secondary to cancer  - [place]er [place] [facility] [place]. Pt is interested in pursuing diagnostic & treatment options of her suspected cancer at this time. Referral is for pain management.  - Follow up pet/ct.    Pericardial effusion  - Obtain echo    Total professional time spent for the encounter: 60 minutes  Time was spent preparing to see the patient, reviewing results of prior testing, obtaining and/or reviewing separately obtained history, performing a medically appropriate examination and interview, counseling and educating the patient/family, ordering medications/tests/procedures, referring and communicating with other health care professionals, documenting clinical information in the electronic health record, and independently interpreting results.    Josephine [place], MD  [facility]

## 2023-10-31 NOTE — PROGRESS NOTES
"History & Physical  [facility]    SUBJECTIVE:     Chief Complaint:   Lung mass    History of Present Illness:  Whit Sloan is a 83 y.o. female who presents for evaluation of a lung mass. Lung mass was found on a CT chest which was done to evaluate new symptoms. The pt has been experiencing new symptoms that include weight loss. Also having left neck/scapula pain x3 months. Also had two lumps on her upper back that showed up ~3 months ago. She is experiencing shortness of breath x1 month.    "I cough every now and then, but not much."     History of right breast cancer 15 diagnosed with stereotactic biopsy. Invasive ductal carcinoma, well-differentiated with mucinous features. ER - Positive (100%, strong). [place] - Positive (100%, strong). She underwent surgical lumpectomy with negative SL. She then took tamoxifen for five years. No recurrence.    She smoked maybe 1/2 PPD from age 19 - 49.    PMH: CKD, DM2, breast cancer    Review of patient's allergies indicates:  No Known Allergies    Past Medical History:   Diagnosis Date    Breast cancer 2016    Right breast    CKD (chronic kidney disease) stage 3, GFR 30-59 ml/min     Depressive disorder, not elsewhere classified     Disorder of bone and cartilage, unspecified     Hypertension     Malignant neoplasm of lower-inner quadrant of right female breast 2016    Other and unspecified hyperlipidemia     Recurrent nephrolithiasis     Type II or unspecified type diabetes mellitus without mention of complication, uncontrolled      Past Surgical History:   Procedure Laterality Date    BREAST BIOPSY Right     BREAST LUMPECTOMY Right     HYSTERECTOMY      one ovary/left    OOPHORECTOMY      one     Family History   Problem Relation Age of Onset    Heart disease Mother     Diabetes Mother     Diabetes Sister         2  with diabetes/amputation    Cancer Brother         lung     Social History     Socioeconomic History    Marital status: " "   Tobacco Use    Smoking status: Former     Current packs/day: 0.00     Types: Cigarettes     Quit date: 1994     Years since quittin.2    Smokeless tobacco: Never   Substance and Sexual Activity    Alcohol use: No    Drug use: No    Sexual activity: Not Currently     Review of Systems:  See HPI    OBJECTIVE:     Vital Signs  Vitals:    10/31/23 1442   BP: 131/67   BP Location: Right arm   Patient Position: Sitting   BP Method: Small (Automatic)   Pulse: 93   SpO2: 98%  Comment: at rest on room air   Weight: 64 kg (141 lb 1.5 oz)   Height: 5' 4" (1.626 m)     Body mass index is 24.22 kg/m².    Physical Exam:  General: no distress  Eyes:  conjunctivae/corneas clear  Nose: no discharge  Neck: trachea midline with no masses appreciated  Lungs:  normal respiratory effort, no wheezes, no rales  Heart: regular rate and rhythm and no murmur  Abdomen: non-distended  Extremities: no cyanosis, no edema, no clubbing  Skin: No rashes or lesions. good skin turgor  Neurologic: alert, oriented, thought content appropriate    Laboratory:  Lab Results   Component Value Date    WBC 8.50 10/27/2023    HGB 10.9 (L) 10/27/2023    HCT 35.4 (L) 10/27/2023    MCV 86 10/27/2023     10/27/2023     Chest Imaging, My Impression:   CT chest 10/2023: There is a peripheral left upper lobe lung mass in the lingula measuring 7.7 x 7.9 cm. Small pericardial effusion with complex appearance. There is a spiculated 1.2 x 2.1 cm nodule in the right lower lobe.     CT abd/pelvis: There is a 3.2 cm by 1.8 cm hypodense lesion in the pancreatic head without pancreatic ductal dilatation. This may reflect cystic or solid mass.    ASSESSMENT/PLAN:     Left lung mass  - Discussed concern for malignancy with the patient. If excisional biopsy of the soft tissue mass is negative, then I will schedule the pt for bronchoscopic biopsy at [facility].  - Obtain pet/ct.    Soft tissue mass back  - Firm. Recommend excisional biospy. Refer to general " surgery.    Pancreatic lesion  - CA 19-9  - Follow up PET/CT.    Right lung nodule  - Obtain pet/ct.    Pain suspected to be secondary to cancer  - Refer to palliative care clinic at [facility] for pain management. Pt is interested in pursuing diagnostic & treatment options of her suspected cancer at this time. Referral is for pain management.  - Follow up pet/ct.    Pericardial effusion  - Obtain echo    Total professional time spent for the encounter: 60 minutes  Time was spent preparing to see the patient, reviewing results of prior testing, obtaining and/or reviewing separately obtained history, performing a medically appropriate examination and interview, counseling and educating the patient/family, ordering medications/tests/procedures, referring and communicating with other health care professionals, documenting clinical information in the electronic health record, and independently interpreting results.    [place] Telida, MD  [facility]

## 2023-11-02 ENCOUNTER — OFFICE VISIT (OUTPATIENT)
Dept: SURGERY | Facility: CLINIC | Age: 83
End: 2023-11-02
Payer: MEDICARE

## 2023-11-02 VITALS
DIASTOLIC BLOOD PRESSURE: 71 MMHG | SYSTOLIC BLOOD PRESSURE: 153 MMHG | HEART RATE: 96 BPM | BODY MASS INDEX: 24.2 KG/M2 | WEIGHT: 141 LBS

## 2023-11-02 DIAGNOSIS — M79.89 SOFT TISSUE MASS: ICD-10-CM

## 2023-11-02 PROCEDURE — 99999 PR PBB SHADOW E&M-EST. PATIENT-LVL III: ICD-10-PCS | Mod: PBBFAC,,, | Performed by: SURGERY

## 2023-11-02 PROCEDURE — 99213 OFFICE O/P EST LOW 20 MIN: CPT | Mod: PBBFAC,PN | Performed by: SURGERY

## 2023-11-02 PROCEDURE — 99999 PR PBB SHADOW E&M-EST. PATIENT-LVL III: CPT | Mod: PBBFAC,,, | Performed by: SURGERY

## 2023-11-02 PROCEDURE — 99204 OFFICE O/P NEW MOD 45 MIN: CPT | Mod: S$PBB,,, | Performed by: SURGERY

## 2023-11-02 PROCEDURE — 99204 PR OFFICE/OUTPT VISIT, NEW, LEVL IV, 45-59 MIN: ICD-10-PCS | Mod: S$PBB,,, | Performed by: SURGERY

## 2023-11-06 DIAGNOSIS — R91.8 LUNG MASS: Primary | ICD-10-CM

## 2023-11-07 ENCOUNTER — DOCUMENTATION ONLY (OUTPATIENT)
Dept: PULMONOLOGY | Facility: CLINIC | Age: 83
End: 2023-11-07
Payer: MEDICARE

## 2023-11-07 NOTE — PROGRESS NOTES
- Discussed results of echocardiogram- normal.  - Discussed surgery consultation. Patient is cleared for surgery from a pulmonary standpoint & can proceed with scheduling at her earliest convenience.  - Patient is still having pain & will make an appointment with palliative care because I suspect this is cancer-related pain. Reviewed tylenol dosing with patient: she can take it 650mg q6h prn for pain.  - PET/CT & bronchoscopy are scheduled for next week.    Dae Diaz MD  [facility]

## 2023-11-07 NOTE — PROGRESS NOTES
"History & Physical    SUBJECTIVE:     History of Present Illness:  Patient is a 83 y.o. female presents with History of right breast cancer 5/20/16 diagnosed with stereotactic biopsy. Invasive ductal carcinoma, well-differentiated with mucinous features. ER - Positive (100%, strong). RI - Positive (100%, strong). She underwent surgical lumpectomy with negative SL. She then took tamoxifen for five years. No recurrence.    She has 2 soft tissue masses on her back 1 proximally 4cm and 1 approximately 2 cm.    Both of these appear to be mobile and lipomatous in nature however with her history of cancer she would like these removed and sent for pathology to rule out any type recurrence.      Chief Complaint   Patient presents with    Cyst       Review of patient's allergies indicates:  No Known Allergies    Current Outpatient Medications   Medication Sig Dispense Refill    allopurinoL (ZYLOPRIM) 100 MG tablet Take 1 tablet by mouth once daily.      diclofenac sodium (VOLTAREN) 1 % Gel Apply 1 inch topically.      dorzolamide-timolol 2-0.5% (COSOPT) 22.3-6.8 mg/mL ophthalmic solution Place 1 drop into the right eye 2 (two) times daily.      ferrous sulfate (FEOSOL) 325 mg (65 mg iron) Tab tablet Take 1 tablet by mouth every Mon, Wed, Fri.      finerenone (KERENDIA) 10 mg Tab Take 10 mg by mouth.      glipiZIDE (GLUCOTROL) 10 MG tablet TAKE 1 TABLET BY MOUTH TWICE A DAY BEFORE MEALS 180 tablet 1    insulin detemir U-100, Levemir, (LEVEMIR FLEXPEN) 100 unit/mL (3 mL) InPn pen Inject 5 Units into the skin every evening. 3 mL 3    JANUVIA 50 mg Tab TAKE 1 TABLET BY MOUTH EVERY DAY 90 tablet 1    latanoprost 0.005 % ophthalmic solution Place 1 drop into the right eye nightly.      pen needle, diabetic 29 gauge x 1/2" Ndle 1 Needle by Misc.(Non-Drug; Combo Route) route every evening. 100 each 1    simvastatin (ZOCOR) 40 MG tablet TAKE 1 TABLET BY MOUTH EVERY DAY IN THE EVENING 90 tablet 1    timolol maleate 0.5% (TIMOPTIC) 0.5 " % Drop       vitamin D (VITAMIN D3) 1000 units Tab Take 2,000 Units by mouth.       No current facility-administered medications for this visit.       Past Medical History:   Diagnosis Date    Breast cancer 2016    Right breast    CKD (chronic kidney disease) stage 3, GFR 30-59 ml/min     Depressive disorder, not elsewhere classified     Disorder of bone and cartilage, unspecified     Hypertension     Malignant neoplasm of lower-inner quadrant of right female breast 2016    Other and unspecified hyperlipidemia     Recurrent nephrolithiasis     Type II or unspecified type diabetes mellitus without mention of complication, uncontrolled      Past Surgical History:   Procedure Laterality Date    BREAST BIOPSY Right 2016    BREAST LUMPECTOMY Right 2016    HYSTERECTOMY      one ovary/left    OOPHORECTOMY      one     Family History   Problem Relation Age of Onset    Heart disease Mother     Diabetes Mother     Diabetes Sister         2  with diabetes/amputation    Cancer Brother         lung     Social History     Tobacco Use    Smoking status: Former     Current packs/day: 0.00     Types: Cigarettes     Quit date: 1994     Years since quittin.2    Smokeless tobacco: Never   Substance Use Topics    Alcohol use: No    Drug use: No        Review of Systems:  Review of Systems   Constitutional:  Negative for appetite change, fatigue, fever and unexpected weight change.   HENT:  Negative for sore throat and trouble swallowing.    Eyes: Negative.    Respiratory:  Negative for cough, shortness of breath and wheezing.    Cardiovascular:  Negative for chest pain and leg swelling.   Gastrointestinal:  Negative for abdominal distention, abdominal pain, blood in stool, constipation, diarrhea, nausea and vomiting.   Endocrine: Negative.    Genitourinary: Negative.    Musculoskeletal:  Positive for back pain (Over areas of soft tissue mass).   Skin: Negative.  Negative for rash.   Allergic/Immunologic:  Negative.    Neurological: Negative.    Hematological: Negative.    Psychiatric/Behavioral:  Negative for confusion.      OBJECTIVE:     Vital Signs (Most Recent)  Pulse: 96 (11/02/23 1424)  BP: (!) 153/71 (11/02/23 1424)     64 kg (141 lb)     Physical Exam:  Physical Exam  Vitals and nursing note reviewed.   Constitutional:       Appearance: She is well-developed.   HENT:      Head: Normocephalic and atraumatic.   Cardiovascular:      Rate and Rhythm: Normal rate.      Heart sounds: Normal heart sounds.   Pulmonary:      Effort: Pulmonary effort is normal.   Abdominal:      General: Bowel sounds are normal. There is no distension.      Palpations: Abdomen is soft.      Tenderness: There is no abdominal tenderness.   Musculoskeletal:         General: Normal range of motion.      Cervical back: Normal range of motion.   Skin:     General: Skin is warm and dry.      Capillary Refill: Capillary refill takes less than 2 seconds.             Comments: Multiple soft tissue masses on left upper back area, lipomatous in nature.    Mobile   Neurological:      Mental Status: She is alert and oriented to person, place, and time.   Psychiatric:         Behavior: Behavior normal.     Laboratory  CBC: Reviewed  CMP: Reviewed    Diagnostic Results:  US: Reviewed  CT: Reviewed    ASSESSMENT/PLAN:     83-year-old female with multiple soft tissue mass left upper back.        PLAN:Plan     Once she gets cleared from a pulmonary encourage standpoint we will remove the soft tissue masses from her left upper back.     Patient needs clearance for surgical intervention.

## 2023-11-12 ENCOUNTER — PATIENT MESSAGE (OUTPATIENT)
Dept: DIABETES | Facility: CLINIC | Age: 83
End: 2023-11-12
Payer: MEDICARE

## 2023-11-13 ENCOUNTER — OFFICE VISIT (OUTPATIENT)
Dept: PALLIATIVE MEDICINE | Facility: CLINIC | Age: 83
End: 2023-11-13
Payer: MEDICARE

## 2023-11-13 ENCOUNTER — NURSE TRIAGE (OUTPATIENT)
Dept: ADMINISTRATIVE | Facility: CLINIC | Age: 83
End: 2023-11-13
Payer: MEDICARE

## 2023-11-13 VITALS
BODY MASS INDEX: 23.75 KG/M2 | OXYGEN SATURATION: 98 % | HEIGHT: 64 IN | TEMPERATURE: 97 F | SYSTOLIC BLOOD PRESSURE: 139 MMHG | DIASTOLIC BLOOD PRESSURE: 67 MMHG | HEART RATE: 96 BPM | WEIGHT: 139.13 LBS

## 2023-11-13 DIAGNOSIS — R52 PAIN: ICD-10-CM

## 2023-11-13 DIAGNOSIS — R53.83 FATIGUE, UNSPECIFIED TYPE: ICD-10-CM

## 2023-11-13 DIAGNOSIS — G47.00 INSOMNIA, UNSPECIFIED TYPE: ICD-10-CM

## 2023-11-13 DIAGNOSIS — R91.8 LUNG MASS: ICD-10-CM

## 2023-11-13 DIAGNOSIS — G89.3 NEOPLASM RELATED PAIN: ICD-10-CM

## 2023-11-13 DIAGNOSIS — Z71.89 ACP (ADVANCE CARE PLANNING): ICD-10-CM

## 2023-11-13 DIAGNOSIS — R63.0 ANOREXIA: ICD-10-CM

## 2023-11-13 DIAGNOSIS — Z51.5 ENCOUNTER FOR PALLIATIVE CARE: Primary | ICD-10-CM

## 2023-11-13 DIAGNOSIS — R06.00 DYSPNEA, UNSPECIFIED TYPE: ICD-10-CM

## 2023-11-13 PROCEDURE — 99417 PROLNG OP E/M EACH 15 MIN: CPT | Mod: S$PBB,,, | Performed by: NURSE PRACTITIONER

## 2023-11-13 PROCEDURE — 99999 PR PBB SHADOW E&M-EST. PATIENT-LVL III: ICD-10-PCS | Mod: PBBFAC,,, | Performed by: NURSE PRACTITIONER

## 2023-11-13 PROCEDURE — 99497 PR ADVNCD CARE PLAN 30 MIN: ICD-10-PCS | Mod: S$PBB,,, | Performed by: NURSE PRACTITIONER

## 2023-11-13 PROCEDURE — 99999 PR PBB SHADOW E&M-EST. PATIENT-LVL III: CPT | Mod: PBBFAC,,, | Performed by: NURSE PRACTITIONER

## 2023-11-13 PROCEDURE — 99215 PR OFFICE/OUTPT VISIT, EST, LEVL V, 40-54 MIN: ICD-10-PCS | Mod: S$PBB,,, | Performed by: NURSE PRACTITIONER

## 2023-11-13 PROCEDURE — 99213 OFFICE O/P EST LOW 20 MIN: CPT | Mod: PBBFAC | Performed by: NURSE PRACTITIONER

## 2023-11-13 PROCEDURE — 99417 PR PROLONGED SVC, OUTPT, W/WO DIRECT PT CONTACT,  EA ADDTL 15 MIN: ICD-10-PCS | Mod: S$PBB,,, | Performed by: NURSE PRACTITIONER

## 2023-11-13 PROCEDURE — 99497 ADVNCD CARE PLAN 30 MIN: CPT | Mod: S$PBB,,, | Performed by: NURSE PRACTITIONER

## 2023-11-13 PROCEDURE — 99215 OFFICE O/P EST HI 40 MIN: CPT | Mod: S$PBB,,, | Performed by: NURSE PRACTITIONER

## 2023-11-13 RX ORDER — HYDROCODONE BITARTRATE AND ACETAMINOPHEN 5; 325 MG/1; MG/1
1 TABLET ORAL EVERY 6 HOURS PRN
Qty: 60 TABLET | Refills: 0 | Status: SHIPPED | OUTPATIENT
Start: 2023-11-13 | End: 2023-11-13 | Stop reason: SDUPTHER

## 2023-11-13 RX ORDER — NALOXONE HYDROCHLORIDE 4 MG/.1ML
1 SPRAY NASAL ONCE
Qty: 2 EACH | Refills: 0 | Status: SHIPPED | OUTPATIENT
Start: 2023-11-13 | End: 2023-11-14

## 2023-11-13 RX ORDER — SENNOSIDES 8.6 MG/1
1 TABLET ORAL NIGHTLY
Qty: 30 TABLET | Refills: 0 | Status: SHIPPED | OUTPATIENT
Start: 2023-11-13 | End: 2023-11-13 | Stop reason: SDUPTHER

## 2023-11-13 RX ORDER — HYDROCODONE BITARTRATE AND ACETAMINOPHEN 5; 325 MG/1; MG/1
1 TABLET ORAL EVERY 6 HOURS PRN
Qty: 60 TABLET | Refills: 0 | Status: SHIPPED | OUTPATIENT
Start: 2023-11-13 | End: 2023-12-04 | Stop reason: SDUPTHER

## 2023-11-13 RX ORDER — SENNOSIDES 8.6 MG/1
1 TABLET ORAL NIGHTLY
Qty: 30 TABLET | Refills: 0 | Status: SHIPPED | OUTPATIENT
Start: 2023-11-13 | End: 2023-12-20 | Stop reason: SDUPTHER

## 2023-11-13 RX ORDER — NALOXONE HYDROCHLORIDE 4 MG/.1ML
1 SPRAY NASAL ONCE
Qty: 1 EACH | Refills: 0 | Status: SHIPPED | OUTPATIENT
Start: 2023-11-13 | End: 2023-11-13 | Stop reason: SDUPTHER

## 2023-11-13 NOTE — PROGRESS NOTES
Consult Note  Palliative Care      Consult Requested By: Dr. Dae Diaz  Reason for Consult: symptom mgmt/ACP      ASSESSMENT/PLAN:     Plan/Recommendations:    11/13/2023:  - initial visit   - pending PET     Lung mass   - seen by pulmonologist, Dr. Diaz  - concern for neoplasm, PET scan scheduled for 11/14/2023    Encounter for palliative care  - Patient is decisional  - Patient accompanied today by her daughter Sylvie  - ACP documents are not uploaded into EMR, reviewed packet at initial clinic visit. Patient will discuss with family at home, will complete documents at future visit  - Philosophy of Palliative Medicine reviewed with patient and family at first visit  - New patient folder given to and reviewed with patient and family at first visit  - Goals of care: Limited exploration, pending initial PET scan     Shoulder pain, bilateral/neck pain  - x 3 months, severe  - pain is concentrated in left scapular region extending up left-side neck to ear, across back wrapping around to right shoulder  - initially thought to be arthritic pain, patient previously referred to PT/OT & ortho by PCP  - dc'd methocarbomol, ineffective  - has been using daily doses of tylenol as high as > 6,000 mg x 10 days with partial relief, discussed with patient and daughter danger associated with high doses of tylenol and informed of recommendation to restrict < 3,000 mg daily. Will discontinue use of additional doses of tylenol with addition of norco today  - start norco 5 mg q 6 hrs, PRN  - daughter/patient prefers conservative use of pain medication    - will consider med for neuropathic pain at future visit   - narcan ordered   - senna started  - will continue to monitor     Dyspnea  - w activity   -x 1 month  - recovers well w frequent rest breaks  - will continue to monitor     Anorexia   - reports rapid weight loss  - at initial visit, daughter voices concern that lexapro, now dc'd caused this ongoing SE  - daughter  voices concern with patient's lack of interest in food   - though formal diagnosis is TDB due to pending PET, explained this is a common SE for patients with advanced cancer  - referred to nutrition on 11/13  - will continue to monitor     Understanding of illness: limited without formal diagnosis     Goals of care: TBD    Follow up: 4 weeks    SUBJECTIVE:     History of Present Illness:  Patient is a 83 y.o. year old female presenting with lung mass. Please see chart for detailed history.     11/13/2023:  OLGA ARAMBULA reviewed and summarized: no entries     Patient presents to initial clinic visit with her daughter, Sylvie. [facility] MSWDIOMEDES Majano present during appointment. Patient was referred to [facility] clinic by pulmonologist subsequent to discovery of lung mass concerning for cancer. She was initially diagnosed with arthritis, the mass was an incidental finding from a shoulder x-ray. Additionally she has multiple soft tissue masses on her left back that were previously characterized as lipomas. Has PET scan scheduled for tomorrow and bronchoscopy scheduled for 11/14. Patient's primary concern is severe pain mostly located in upper back and shoulders. Her daughter has been giving her high doses of tylenol well above recommended range, instructed her to discontinue. Additionally, the tylenol has been minimally effective. Started trial of low-dose norco today. Reports poor appetite and significant weight loss, fatigue and shortness of breath. Patient's daughter is intermittently tearful during appt as she expresses worry and protectiveness for her mother. They have a clear understanding that pt may be diagnosed with cancer. Patient states that she is strong and will handle anything that comes her way.     Past Medical History:   Diagnosis Date    Breast cancer 2016    Right breast    CKD (chronic kidney disease) stage 3, GFR 30-59 ml/min     Depressive disorder, not elsewhere classified     Disorder of bone and  "cartilage, unspecified     Hypertension     Malignant neoplasm of lower-inner quadrant of right female breast 2016    Other and unspecified hyperlipidemia     Recurrent nephrolithiasis     Type II or unspecified type diabetes mellitus without mention of complication, uncontrolled      Past Surgical History:   Procedure Laterality Date    BREAST BIOPSY Right 2016    BREAST LUMPECTOMY Right 2016    HYSTERECTOMY      one ovary/left    OOPHORECTOMY      one     Family History   Problem Relation Age of Onset    Heart disease Mother     Diabetes Mother     Diabetes Sister         2  with diabetes/amputation    Cancer Brother         lung     Review of patient's allergies indicates:  No Known Allergies    Medications:    Current Outpatient Medications:     allopurinoL (ZYLOPRIM) 100 MG tablet, Take 1 tablet by mouth once daily., Disp: , Rfl:     diclofenac sodium (VOLTAREN) 1 % Gel, Apply 1 inch topically., Disp: , Rfl:     dorzolamide-timolol 2-0.5% (COSOPT) 22.3-6.8 mg/mL ophthalmic solution, Place 1 drop into the right eye 2 (two) times daily., Disp: , Rfl:     ferrous sulfate (FEOSOL) 325 mg (65 mg iron) Tab tablet, Take 1 tablet by mouth every Mon, Wed, Fri., Disp: , Rfl:     finerenone (KERENDIA) 10 mg Tab, Take 10 mg by mouth., Disp: , Rfl:     glipiZIDE (GLUCOTROL) 10 MG tablet, TAKE 1 TABLET BY MOUTH TWICE A DAY BEFORE MEALS, Disp: 180 tablet, Rfl: 1    insulin detemir U-100, Levemir, (LEVEMIR FLEXPEN) 100 unit/mL (3 mL) InPn pen, Inject 5 Units into the skin every evening., Disp: 3 mL, Rfl: 3    JANUVIA 50 mg Tab, TAKE 1 TABLET BY MOUTH EVERY DAY, Disp: 90 tablet, Rfl: 1    latanoprost 0.005 % ophthalmic solution, Place 1 drop into the right eye nightly., Disp: , Rfl:     pen needle, diabetic 29 gauge x 1/2" Ndle, 1 Needle by Misc.(Non-Drug; Combo Route) route every evening., Disp: 100 each, Rfl: 1    simvastatin (ZOCOR) 40 MG tablet, TAKE 1 TABLET BY MOUTH EVERY DAY IN THE EVENING, Disp: 90 tablet, " Rfl: 1    timolol maleate 0.5% (TIMOPTIC) 0.5 % Drop, , Disp: , Rfl:     vitamin D (VITAMIN D3) 1000 units Tab, Take 2,000 Units by mouth., Disp: , Rfl:     OBJECTIVE:       ROS:  Review of Systems   Constitutional:  Positive for activity change, appetite change, fatigue and unexpected weight change.   HENT:  Positive for ear pain.    Respiratory:  Negative for shortness of breath, wheezing and stridor.    Cardiovascular:  Negative for chest pain, palpitations and leg swelling.   Gastrointestinal:  Negative for abdominal pain, constipation, diarrhea, nausea and vomiting.   Musculoskeletal:  Positive for back pain and neck pain. Negative for gait problem and joint swelling.   Skin:  Negative for pallor, rash and wound.       Review of Symptoms      Symptom Assessment (ESAS 0-10 Scale)  Pain:  7  Dyspnea:  0  Anxiety:  0  Nausea:  0  Depression:  0  Anorexia:  5  Fatigue:  7  Insomnia:  5  Restlessness:  0  Agitation:  0     CAM / Delirium:  Negative  Constipation:  Negative  Diarrhea:  Negative    Constipation:  No constipation    Pain Assessment:    Location(s): back    Back       Location: upper and left        Quality: Pressure-like        Quantity: 7/10 in intensity        Chronicity: Onset 3 month(s) ago, gradually worsening        Aggravating Factors: None        Alleviating Factors: Acetaminophen       Associated Symptoms: None    Modified Sherrie Scale:  0    ECOG Performance Status ndGndrndanddndend:nd nd2nd Living Arrangements:  Lives with family    Psychosocial/Cultural:   See Palliative Psychosocial Note: No  **Primary  to Follow**  Palliative Care  Consult: No     Time-Based Charting:  No      Advance Care Planning   Advance Directives:   Living Will: No        Oral Declaration: No    LaPOST: No    Do Not Resuscitate Status: No    Medical Power of : No        Oral Declaration: No      Decision Making:  Patient answered questions  Goals of Care: The patient endorses that what is most  important right now is to focus on symptom/pain control and extending life as long as possible, even it it means sacrificing quality    Accordingly, we have decided that the best plan to meet the patient's goals includes continuing with treatment              Physical Exam:  Vitals:    Physical Exam    Labs:  CBC:   WBC   Date Value Ref Range Status   10/27/2023 8.50 3.90 - 12.70 K/uL Final     Hemoglobin   Date Value Ref Range Status   10/27/2023 10.9 (L) 12.0 - 16.0 g/dL Final     Hematocrit   Date Value Ref Range Status   10/27/2023 35.4 (L) 37.0 - 48.5 % Final     MCV   Date Value Ref Range Status   10/27/2023 86 82 - 98 fL Final     Platelets   Date Value Ref Range Status   10/27/2023 375 150 - 450 K/uL Final       LFT:   Lab Results   Component Value Date    AST 12 10/27/2023    ALKPHOS 93 10/27/2023    BILITOT 0.3 10/27/2023       Albumin:   Albumin   Date Value Ref Range Status   10/27/2023 3.2 (L) 3.5 - 5.2 g/dL Final     Protein:   Total Protein   Date Value Ref Range Status   10/27/2023 8.3 6.0 - 8.4 g/dL Final       Radiology:I have reviewed all pertinent imaging results/findings within the past 24 hours.      I spent a total of 75 minutes on the day of the visit.This includes face to face time in discussion of goals of care, symptom assessment, coordination of care and emotional support.  This also includes non-face to face time preparing to see the patient (eg, review of tests/imaging), obtaining and/or reviewing separately obtained history, documenting clinical information in the electronic or other health record, independently interpreting results and communicating results to the patient/family/caregiver, or care coordinator.     A total of 16 min was spent on advance care planning, goals of care discussion, emotional support, formulating and communicating prognosis and goals of care, exploring burden/benefit of various approaches of treatment. This discussion occurred on a fully voluntary basis with  the verbal consent of the patient and/or family    Signature: Summer Candelaria DNP

## 2023-11-13 NOTE — TELEPHONE ENCOUNTER
Aracely states that pt took Senna 8.6 mg for pain and pain is getting worse. Educated Aracely that Senna is a laxative/stool softener and that Norco 5-325 mg is for pain. States that pharmacy did not give pt Hudgins 5-325 mg. Call placed to [facility]; states that PA was received and co pay is $5.00. Aracely made aware. States that she will give pt Tylenol and  prescription in am. VU. Encounter routed to provider.   Reason for Disposition   Caller with prescription and triager answers question    Protocols used: Medication Refill and Renewal Call-A-AH

## 2023-11-14 ENCOUNTER — HOSPITAL ENCOUNTER (OUTPATIENT)
Dept: RADIOLOGY | Facility: HOSPITAL | Age: 83
Discharge: HOME | End: 2023-11-14
Attending: INTERNAL MEDICINE
Payer: MEDICARE

## 2023-11-14 DIAGNOSIS — R91.1 SOLID NODULE OF LUNG GREATER THAN 8 MM IN DIAMETER: ICD-10-CM

## 2023-11-14 LAB — POCT GLUCOSE: 160 MG/DL (ref 70–110)

## 2023-11-14 PROCEDURE — A9552 F18 FDG: HCPCS

## 2023-11-14 PROCEDURE — 78815 NM PET CT FDG SKULL BASE TO MID THIGH: ICD-10-PCS | Mod: 26,PI,, | Performed by: STUDENT IN AN ORGANIZED HEALTH CARE EDUCATION/TRAINING PROGRAM

## 2023-11-14 PROCEDURE — 78815 PET IMAGE W/CT SKULL-THIGH: CPT | Mod: 26,PI,, | Performed by: STUDENT IN AN ORGANIZED HEALTH CARE EDUCATION/TRAINING PROGRAM

## 2023-11-15 ENCOUNTER — TELEPHONE (OUTPATIENT)
Dept: PULMONOLOGY | Facility: CLINIC | Age: 83
End: 2023-11-15
Payer: MEDICARE

## 2023-11-15 NOTE — TELEPHONE ENCOUNTER
Attempted to call patient x 2 for procedure scheduled tomorrow. Left voicemail with instructions to report to [facility] at 830am and to remain in NPO after midnight. Instructed patient to call me at [phone] if she needs to speak to me.

## 2023-11-16 ENCOUNTER — PATIENT MESSAGE (OUTPATIENT)
Dept: SURGERY | Facility: CLINIC | Age: 83
End: 2023-11-16
Payer: MEDICARE

## 2023-11-16 ENCOUNTER — TELEPHONE (OUTPATIENT)
Dept: SURGERY | Facility: CLINIC | Age: 83
End: 2023-11-16
Payer: MEDICARE

## 2023-11-16 ENCOUNTER — HOSPITAL ENCOUNTER (OUTPATIENT)
Facility: HOSPITAL | Age: 83
Discharge: HOME | End: 2023-11-16
Attending: INTERNAL MEDICINE | Admitting: INTERNAL MEDICINE
Payer: MEDICARE

## 2023-11-16 VITALS
RESPIRATION RATE: 20 BRPM | SYSTOLIC BLOOD PRESSURE: 142 MMHG | OXYGEN SATURATION: 99 % | HEART RATE: 86 BPM | WEIGHT: 139 LBS | DIASTOLIC BLOOD PRESSURE: 65 MMHG | HEIGHT: 64 IN | BODY MASS INDEX: 23.73 KG/M2 | TEMPERATURE: 98 F

## 2023-11-16 DIAGNOSIS — R91.8 ABNORMAL CT SCAN OF LUNG: Primary | ICD-10-CM

## 2023-11-16 DIAGNOSIS — Z12.11 SCREENING FOR MALIGNANT NEOPLASM OF COLON: Primary | ICD-10-CM

## 2023-11-16 DIAGNOSIS — R91.1 SOLID NODULE OF LUNG GREATER THAN 8 MM IN DIAMETER: ICD-10-CM

## 2023-11-16 LAB — POCT GLUCOSE: 166 MG/DL (ref 70–110)

## 2023-11-16 PROCEDURE — 31624 DX BRONCHOSCOPE/LAVAGE: CPT | Performed by: EMERGENCY MEDICINE

## 2023-11-16 PROCEDURE — 27201011 HC FORCEPS DISPOSABLE: Performed by: EMERGENCY MEDICINE

## 2023-11-16 PROCEDURE — 82962 GLUCOSE BLOOD TEST: CPT

## 2023-11-16 PROCEDURE — 71000015 HC POSTOP RECOV 1ST HR

## 2023-11-16 PROCEDURE — 88305 TISSUE EXAM BY PATHOLOGIST: CPT | Mod: 26,,, | Performed by: PATHOLOGY

## 2023-11-16 PROCEDURE — 31628 PR BRONCHOSCOPY,TRANSBRONCH BIOPSY: ICD-10-PCS | Mod: LT,GC,, | Performed by: EMERGENCY MEDICINE

## 2023-11-16 PROCEDURE — 88305 TISSUE EXAM BY PATHOLOGIST: CPT | Performed by: PATHOLOGY

## 2023-11-16 PROCEDURE — 99152 MOD SED SAME PHYS/QHP 5/>YRS: CPT | Performed by: EMERGENCY MEDICINE

## 2023-11-16 PROCEDURE — 25000003 PHARM REV CODE 250: Performed by: EMERGENCY MEDICINE

## 2023-11-16 PROCEDURE — 63600175 PHARM REV CODE 636 W HCPCS: Performed by: EMERGENCY MEDICINE

## 2023-11-16 PROCEDURE — 71000016 HC POSTOP RECOV ADDL HR

## 2023-11-16 PROCEDURE — 99153 MOD SED SAME PHYS/QHP EA: CPT | Performed by: EMERGENCY MEDICINE

## 2023-11-16 PROCEDURE — 31628 BRONCHOSCOPY/LUNG BX EACH: CPT | Performed by: EMERGENCY MEDICINE

## 2023-11-16 PROCEDURE — 31628 BRONCHOSCOPY/LUNG BX EACH: CPT | Mod: LT,GC,, | Performed by: EMERGENCY MEDICINE

## 2023-11-16 PROCEDURE — 88305 TISSUE EXAM BY PATHOLOGIST: ICD-10-PCS | Mod: 26,,, | Performed by: PATHOLOGY

## 2023-11-16 RX ORDER — LIDOCAINE HYDROCHLORIDE 20 MG/ML
INJECTION, SOLUTION INFILTRATION; PERINEURAL CODE/TRAUMA/SEDATION MEDICATION
Status: COMPLETED | OUTPATIENT
Start: 2023-11-16 | End: 2023-11-16

## 2023-11-16 RX ORDER — FENTANYL CITRATE 50 UG/ML
INJECTION, SOLUTION INTRAMUSCULAR; INTRAVENOUS CODE/TRAUMA/SEDATION MEDICATION
Status: COMPLETED | OUTPATIENT
Start: 2023-11-16 | End: 2023-11-16

## 2023-11-16 RX ORDER — MIDAZOLAM HYDROCHLORIDE 5 MG/ML
INJECTION INTRAMUSCULAR; INTRAVENOUS CODE/TRAUMA/SEDATION MEDICATION
Status: COMPLETED | OUTPATIENT
Start: 2023-11-16 | End: 2023-11-16

## 2023-11-16 RX ORDER — LIDOCAINE HYDROCHLORIDE 10 MG/ML
INJECTION INFILTRATION; PERINEURAL CODE/TRAUMA/SEDATION MEDICATION
Status: COMPLETED | OUTPATIENT
Start: 2023-11-16 | End: 2023-11-16

## 2023-11-16 RX ADMIN — MIDAZOLAM 2 MG: 5 INJECTION INTRAMUSCULAR; INTRAVENOUS at 09:11

## 2023-11-16 RX ADMIN — LIDOCAINE HYDROCHLORIDE 8 ML: 10 INJECTION, SOLUTION INFILTRATION; PERINEURAL at 09:11

## 2023-11-16 RX ADMIN — LIDOCAINE HYDROCHLORIDE 6 ML: 20 INJECTION, SOLUTION INFILTRATION; PERINEURAL at 09:11

## 2023-11-16 RX ADMIN — FENTANYL CITRATE 12.5 MCG: 50 INJECTION, SOLUTION INTRAMUSCULAR; INTRAVENOUS at 09:11

## 2023-11-16 RX ADMIN — FENTANYL CITRATE 25 MCG: 50 INJECTION, SOLUTION INTRAMUSCULAR; INTRAVENOUS at 09:11

## 2023-11-16 NOTE — INTERVAL H&P NOTE
The patient has been examined and the H&P has been reviewed:    I concur with the findings and no changes have occurred since H&P was written.    Procedure risks, benefits and alternative options discussed and understood by patient/family.  She is not on anticoagulation and last ate/drink yesterday.    Mallampati score: 2  ASA class 2    There are no hospital problems to display for this patient.    Left lung mass  Will proceed with bronchoscopy with biopsy.     Yokasta Romeo DO  [employer] PCCM Fellow

## 2023-11-16 NOTE — TELEPHONE ENCOUNTER
All of the following information was discussed, and explained to this patient's daughter(Aracely Gomez) on behalf of this patient.    The  patient's daughter  verbally consented to a Colonoscopy and requested to be scheduled for a Colonoscopy on 11/29/2023  Patient's daughter  was advised a designated  is required on the day of the Colonoscopy to drive the patient home and the  must be at least. 18 years old.Colonoscopy Prep instructions were thoroughly explained and discussed with the patient's  daughter .It was emphasized, and reiterated to the patient, to please not to follow the bowel prep instructions that comes with the bowel prep package.However, to please follow the prep instructions that will be received in the mail,or via the [employer] portal, or by both modes of delivery, which ever method of delivery the patient prefers,from the [facility]   Patient's daughter  acknowledges understanding Prep instructions as explained and discussed on the phone.. Patient's daughter  was advised the Colonoscopy Prep instructions discussed and explained on the phone,are being mailed out to the patient's verified address on file,or put onto the [employer] portal,or both methods of delivery, whichever the patient prefers. Patient's address on file was verified with the patient for accuracy of mailing. Patient's medications on file was reviewed with the patient for accuracy of information. Patient denies taking  any other medications other than those listed and verified on medication profile.Patient was explained the Colonoscopy will be performed here at [facility]. Patient's daughter  was further explained the Pre-Op will call one day prior to the procedure date, to discuss Pre-Op instructions;and what time to report for the Colonoscopy. The patient's daughter was given the opportunity to ask any questions about the Colonoscopy. No further issues were discussed.

## 2023-11-16 NOTE — DISCHARGE SUMMARY
[facility] - Surgery (2nd Fl)  Discharge Note  Short Stay    Procedure(s) (LRB):  BRONCHOSCOPY (N/A)      OUTCOME: Patient tolerated treatment/procedure well without complication and is now ready for discharge.    DISPOSITION: Home or Self Care    FINAL DIAGNOSIS:  Left upper lobe mass    FOLLOWUP: In clinic    DISCHARGE INSTRUCTIONS:    Discharge Procedure Orders   Diet general     Cytology- FNA Radiology Guided, Bronch/EBUS, EUS/GI   Order Comments: Specimen 1: Transbronchial biopsy left upper lobe mass     Order Specific Question Answer Comments   Source: Lung (radiologist guided with path adequacy)    Clinical Information: 84 yo PMH of breast cancer now with left upper lobe mass    Specific Site: Left upper lobe transbronchial biopsy    Other Requests: NA    Total number of passes: 7    Number of air dried slides submitted: 0    Number of fixed slides submitted: 0    Additional Material Submitted: See below    Release to patient Immediate         TIME SPENT ON DISCHARGE: 10 minutes    Yokasta Romeo DO  [employer] PCCM Fellow

## 2023-11-16 NOTE — PLAN OF CARE
Patient discharged to home via wheelchair, escorted by [facility] transport. Pt alert and talkative, vitals stable on room air, tolerating PO intake. Discharge instructions (written and verbal) and follow-up information given to patient who verbalized understanding, as well as a readiness for discharge. C contact info provided for additional questions following discharge. YOSEF

## 2023-11-16 NOTE — ED NOTES
H and P updated-yes, patient placed on cardiac monitor, anesthesia Plan:  Conscious sedation, ASA verified-yes, Airway exam performed-yes, Personal or Family history of anesthesia complications-No  Consent signed and witnessed, Millicent Treviño RN

## 2023-11-16 NOTE — ED NOTES
Specimens obtained during Bronchoscopy:  DANO TBBX.  Verbal report will be given to [facility] RN at bedside to include documentation charted in procedural sedation documentation.  Patient to be in NPO 1 hour post procedure and place in PO tolerance at 11am.  The patient tolerated the procedure well.

## 2023-11-16 NOTE — ED NOTES
Moderate concious sedation was performed and cardiorespiratory functions were monitored the entire procedure by Yani East RN.  Sedation began at 947am  and concluded at 1010am.

## 2023-11-20 ENCOUNTER — TELEPHONE (OUTPATIENT)
Dept: PALLIATIVE MEDICINE | Facility: CLINIC | Age: 83
End: 2023-11-20
Payer: MEDICARE

## 2023-11-21 LAB
FINAL PATHOLOGIC DIAGNOSIS: NORMAL
Lab: NORMAL

## 2023-11-24 DIAGNOSIS — R91.8 LUNG MASS: Primary | ICD-10-CM

## 2023-11-24 NOTE — PROGRESS NOTES
Reviewed bronchoscopy pathology.. Non-diagnostic. Discussed with Dr. Diaz and will plan to move forward with robotic bronchoscopy.   Case request placed 12/12  No OAC/Anti-PLT  Needs updated HRCT- Ordered.   Discussed with patients daughter by phone.     Denzel Malone MD   [facility]

## 2023-11-27 ENCOUNTER — TELEPHONE (OUTPATIENT)
Dept: PULMONOLOGY | Facility: CLINIC | Age: 83
End: 2023-11-27
Payer: MEDICARE

## 2023-11-27 NOTE — TELEPHONE ENCOUNTER
Spoke with patient daughter, informed her that I'm contacting her in regards to scheduling pt Ct Scan a week before her scheduled procedure with Dr Malone and also to explain the procedure instructions that pt should follow prior to her procedure. Pt daughter verbalized that she understand and states that she will call me right back because she just got inside and needs to take her jacket off. I verbalized to pt daughter that I understand.

## 2023-11-28 ENCOUNTER — TELEPHONE (OUTPATIENT)
Dept: PULMONOLOGY | Facility: CLINIC | Age: 83
End: 2023-11-28
Payer: MEDICARE

## 2023-11-28 ENCOUNTER — TELEPHONE (OUTPATIENT)
Dept: SURGERY | Facility: CLINIC | Age: 83
End: 2023-11-28
Payer: MEDICARE

## 2023-11-28 NOTE — TELEPHONE ENCOUNTER
Mrs Sloan's daughter called to say she wants to postpone her Mother's Colonoscopy which is scheduled for tomorrow at [facility]. She feels her Mother is too weak to have a procedure where she cannot eat for 2 days and she asked me to call or message [facility]. Aracely says her Mother has a lung biopsy coming up and she has to save her strength for that procedure.I will let Dr Diaz know about the cancelled C-scope and I sent a message to Dr Brandon . Julia Hartley LPN

## 2023-11-28 NOTE — TELEPHONE ENCOUNTER
----- Message from Kimberli Chance sent at 11/28/2023  9:51 AM CST -----  Regarding: Speak to staff  Contact: Aracely  Regarding: Speak to staff        Name Of Caller: Aracely        Contact Preference: [phone] (home)          Nature of call:  pt daughter Aracely is calling to speak to staff regarding, pt scheduled colonoscopy, she believe she may be to weak to have procedure on tomorrow, Please advise. Requesting a call back.

## 2023-11-28 NOTE — TELEPHONE ENCOUNTER
A call was placed to this patient's daughter re: a message received to reschedule the patient's Colonoscopy. The patient's daughter (Aracely) will call back when redy to reschedule. Due to another priority heat condition with the patient.

## 2023-12-04 ENCOUNTER — TELEPHONE (OUTPATIENT)
Dept: PULMONOLOGY | Facility: CLINIC | Age: 83
End: 2023-12-04
Payer: MEDICARE

## 2023-12-04 DIAGNOSIS — G89.3 NEOPLASM RELATED PAIN: ICD-10-CM

## 2023-12-04 RX ORDER — HYDROCODONE BITARTRATE AND ACETAMINOPHEN 5; 325 MG/1; MG/1
1 TABLET ORAL EVERY 6 HOURS PRN
Qty: 60 TABLET | Refills: 0 | Status: SHIPPED | OUTPATIENT
Start: 2023-12-04 | End: 2023-12-20 | Stop reason: SDUPTHER

## 2023-12-04 NOTE — TELEPHONE ENCOUNTER
Phone call to patient to confirm appointment and instructions given in clinic for EBUS/Robotic procedure scheduled for 12/12/23.   Patient instructed to arrive to the 2nd floor [facility] check in desk at 0530 on  12/12/23 with designated .  NPO after midnight the night prior to scheduled procedure.  Patient states she takes regular insuline nightly without instructions, messaged sent to Dr. Malone. Will call patient with clarification.  Patient verbalizes understanding of all above instructions given.

## 2023-12-04 NOTE — TELEPHONE ENCOUNTER
Phone call to patient to confirm appointment and instructions given in clinic for EBUS/Robotic procedure scheduled for 12/12/23.   Patient instructed to arrive to the 2nd floor [facility] check in desk at 0530 on 12/12 with designated .  NPO after midnight the night prior to scheduled procedure.  Patient instructed to hold regular insulin starting on 12/11 .Patient not currently taking blood thinners.  Patient verbalizes understanding of all above instructions given.

## 2023-12-11 ENCOUNTER — ANESTHESIA EVENT (OUTPATIENT)
Dept: SURGERY | Facility: HOSPITAL | Age: 83
End: 2023-12-11
Payer: MEDICARE

## 2023-12-11 NOTE — ANESTHESIA PREPROCEDURE EVALUATION
[facility]  Anesthesia Pre-Operative Evaluation         Patient Name: Whit Sloan  YOB: 1940  MRN: [id number]    SUBJECTIVE:     Pre-operative evaluation for Procedure(s) (LRB):  ROBOTIC BRONCHOSCOPY (N/A)     12/11/2023    Whit Sloan is a 83 y.o. female w/ a significant PMHx of CKD3, DM2, breast cancer, HTN, new lung mass with weight loss and left neck/scapula pain..    Patient now presents for the above procedure(s).      LDA: None documented.     TTE 10/2023:    Left Ventricle: The left ventricle is normal in size. Normal wall thickness. Normal wall motion. There is normal systolic function. Ejection fraction by visual approximation is 70%. Grade I diastolic dysfunction.    Right Ventricle: Normal right ventricular cavity size. Systolic function is normal.    Aortic Valve: The aortic valve is a trileaflet valve. There is mild aortic valve sclerosis.    Mitral Valve: There is moderate mitral annular calcification present. There is no stenosis.    Pulmonic Valve: There is no stenosis.    Aorta: Atherosclerosis of the ascending aorta.    IVC/SVC: Normal venous pressure at 3 mmHg.    Pericardium: There is a trivial effusion. No indication of cardiac tamponade.       Prev airway: None documented.    Drips: None documented.      Patient Active Problem List   Diagnosis    Hypertensive kidney disease with chronic kidney disease stage IV    Chronic renal failure, stage 3b    DM type 2 with diabetic dyslipidemia    Osteopenia    Major depressive disorder    DDD (degenerative disc disease), cervical    BMI 24.0-24.9, adult    Mixed conductive and sensorineural hearing loss of both ears    Type 2 diabetes mellitus with stage 4 chronic kidney disease, without long-term current use of insulin    History of breast cancer    Anemia due to chronic kidney disease    Vitreous syneresis, bilateral    Secondary hyperparathyroidism    Vitamin D deficiency    Renal  "hypertension    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 associated with diabetes mellitus due to underlying condition    Lactose intolerance    Hyperuricemia    H/O: stroke    Drug-induced hyperkalemia    Combined forms of age-related cataract of both eyes    Chronic open angle glaucoma of both eyes, indeterminate stage    Blepharitis of upper and lower eyelids of both eyes    Colonic polyp    Uncontrolled type 2 diabetes mellitus with hyperglycemia    Chronic pain of both shoulders    Anxiety    Lipoma of back    Soft tissue mass    Neoplasm related pain    Pancreatic mass    Pericardial effusion    Lung mass    Solid nodule of lung greater than 8 mm in diameter       Review of patient's allergies indicates:  No Known Allergies    Current Inpatient Medications:      No current facility-administered medications on file prior to encounter.     Current Outpatient Medications on File Prior to Encounter   Medication Sig Dispense Refill    allopurinoL (ZYLOPRIM) 100 MG tablet Take 1 tablet by mouth once daily.      diclofenac sodium (VOLTAREN) 1 % Gel Apply 1 inch topically.      dorzolamide-timolol 2-0.5% (COSOPT) 22.3-6.8 mg/mL ophthalmic solution Place 1 drop into the right eye 2 (two) times daily.      ferrous sulfate (FEOSOL) 325 mg (65 mg iron) Tab tablet Take 1 tablet by mouth every Mon, Wed, Fri.      finerenone (KERENDIA) 10 mg Tab Take 10 mg by mouth.      glipiZIDE (GLUCOTROL) 10 MG tablet TAKE 1 TABLET BY MOUTH TWICE A DAY BEFORE MEALS 180 tablet 1    insulin detemir U-100, Levemir, (LEVEMIR FLEXPEN) 100 unit/mL (3 mL) InPn pen Inject 5 Units into the skin every evening. 3 mL 3    JANUVIA 50 mg Tab TAKE 1 TABLET BY MOUTH EVERY DAY 90 tablet 1    latanoprost 0.005 % ophthalmic solution Place 1 drop into the right eye nightly.      pen needle, diabetic 29 gauge x 1/2" Ndle 1 Needle by Misc.(Non-Drug; Combo Route) route every evening. 100 each 1    " SENNA 8.6 mg tablet Take 1 tablet by mouth every evening. 30 tablet 0    simvastatin (ZOCOR) 40 MG tablet TAKE 1 TABLET BY MOUTH EVERY DAY IN THE EVENING 90 tablet 1    timolol maleate 0.5% (TIMOPTIC) 0.5 % Drop       vitamin D (VITAMIN D3) 1000 units Tab Take 2,000 Units by mouth.         Past Surgical History:   Procedure Laterality Date    BREAST BIOPSY Right 2016    BREAST LUMPECTOMY Right 2016    BRONCHOSCOPY N/A 2023    Procedure: BRONCHOSCOPY;  Surgeon: Provider, Dosc Diagnostic;  Location: [facility] OR [address] [place] [address];  Service: Anesthesiology;  Laterality: N/A;    HYSTERECTOMY      one ovary/left    OOPHORECTOMY      one       Social History     Socioeconomic History    Marital status:    Tobacco Use    Smoking status: Former     Current packs/day: 0.00     Types: Cigarettes     Quit date: 1994     Years since quittin.3    Smokeless tobacco: Never   Substance and Sexual Activity    Alcohol use: No    Drug use: No    Sexual activity: Not Currently       OBJECTIVE:     Vital Signs Range (Last 24H):         Significant Labs:  Lab Results   Component Value Date    WBC 8.50 10/27/2023    HGB 10.9 (L) 10/27/2023    HCT 35.4 (L) 10/27/2023     10/27/2023    CHOL 182 10/27/2023    TRIG 57 10/27/2023    HDL 56 10/27/2023    ALT 7 (L) 10/27/2023    AST 12 10/27/2023     10/27/2023    K 4.5 10/27/2023     10/27/2023    CREATININE 1.5 (H) 10/27/2023    BUN 26 (H) 10/27/2023    CO2 19 (L) 10/27/2023    TSH 3.759 10/27/2023    HGBA1C 8.2 (H) 2023       Diagnostic Studies: No relevant studies.    EKG:   Results for orders placed or performed during the hospital encounter of 16   EKG 12-lead    Collection Time: 16 11:50 AM    Narrative    Test Reason : Z01.818  Blood Pressure :   Vent. Rate : 066 BPM     Atrial Rate : 066 BPM     P-R Int : 150 ms          QRS Dur : 076 ms      QT Int : 406 ms       P-R-T Axes : 014 -02 032 degrees     QTc Int : 425 ms    Normal  sinus rhythm  Normal ECG  When compared with ECG of 18-JUN-2014 15:06,  No significant change was found  Confirmed by ALBERT SALINAS MD ([id number]) on 5/27/2016 12:42:16 PM    Referred By: OLIVIA CHRISTINA           Confirmed By:ALBERT SALINAS MD       2D ECHO:  TTE:  Results for orders placed or performed during the hospital encounter of 11/06/23   Echo   Result Value Ref Range    BSA 1.7 m2    LVOT stroke volume 63.58 cm3    LVIDd 4.37 3.5 - 6.0 cm    LV Systolic Volume 29.54 mL    LV Systolic Volume Index 17.5 mL/m2    LVIDs 2.80 2.1 - 4.0 cm    LV Diastolic Volume 86.14 mL    LV Diastolic Volume Index 50.97 mL/m2    IVS 0.83 0.6 - 1.1 cm    LVOT diameter 1.71 cm    LVOT area 2.3 cm2    FS 36 28 - 44 %    Left Ventricle Relative Wall Thickness 0.36 cm    Posterior Wall 0.78 0.6 - 1.1 cm    LV mass 109.09 g    LV Mass Index 65 g/m2    MV Peak E Johnson 0.96 m/s    TDI LATERAL 0.11 m/s    TDI SEPTAL 0.07 m/s    E/E' ratio 10.67 m/s    MV Peak A Johnson 1.43 m/s    E/A ratio 0.67     E wave deceleration time 230.80 msec    LV SEPTAL E/E' RATIO 13.71 m/s    LV LATERAL E/E' RATIO 8.73 m/s    LVOT peak johnson 1.32 m/s    Left Ventricular Outflow Tract Mean Velocity 0.90 cm/s    Left Ventricular Outflow Tract Mean Gradient 3.68 mmHg    RVDD 2.34 cm    Left Atrium Minor Axis 4.26 cm    Left Atrium Major Axis 4.62 cm    RA Major Axis 4.68 cm    AV mean gradient 5 mmHg    AV peak gradient 9 mmHg    Ao peak johnson 1.50 m/s    Ao VTI 32.50 cm    LVOT peak VTI 27.70 cm    AV valve area 1.96 cm²    AV Velocity Ratio 0.88     AV index (prosthetic) 0.85     [place] by Velocity Ratio 2.02 cm²    MV stenosis pressure 1/2 time 66.93 ms    MV valve area p 1/2 method 3.29 cm2    PV PEAK VELOCITY 0.72 m/s    PV peak gradient 2 mmHg    Pulmonary Valve Mean Velocity 0.59 m/s    Ao root annulus 2.89 cm    IVC diameter 1.16 cm    Mean e' 0.09 m/s    ZLVIDS -0.32     ZLVIDD -0.74     AORTIC VALVE CUSP SEPERATION 1.59 cm    LA Volume Index 22.7 mL/m2    LA volume  38.43 cm3    LA size 3.0 cm    LA volume (mod) 44.00 cm3    LA WIDTH 3.4 cm    LA Volume Index (Mod) 26.0 mL/m2    RA Width 2.2 cm    EF 70 %    Est. RA pres 3 mmHg    Narrative      Left Ventricle: The left ventricle is normal in size. Normal wall   thickness. Normal wall motion. There is normal systolic function. Ejection   fraction by visual approximation is 70%. Grade I diastolic dysfunction.    Right Ventricle: Normal right ventricular cavity size. Systolic   function is normal.    Aortic Valve: The aortic valve is a trileaflet valve. There is mild   aortic valve sclerosis.    Mitral Valve: There is moderate mitral annular calcification present.   There is no stenosis.    Pulmonic Valve: There is no stenosis.    Aorta: Atherosclerosis of the ascending aorta.    IVC/SVC: Normal venous pressure at 3 mmHg.    Pericardium: There is a trivial effusion. No indication of cardiac   tamponade.         BRIE:  No results found for this or any previous visit.    ASSESSMENT/PLAN:         Pre-op Assessment    I have reviewed the Patient Summary Reports.     I have reviewed the Nursing Notes. I have reviewed the NPO Status.   I have reviewed the Medications.     Review of Systems  Anesthesia Hx:   Neg history of prior surgery.          Denies Family Hx of Anesthesia complications.    Denies Personal Hx of Anesthesia complications.                    Hematology/Oncology:                      Current/Recent Cancer.  Breast              EENT/Dental:  EENT/Dental Normal           Cardiovascular:     Hypertension                                  Hypertension         Pulmonary:  Pulmonary Normal                       Renal/:  Chronic Renal Disease        Kidney Function/Disease             Hepatic/GI:  Hepatic/GI Normal                 Musculoskeletal:  Arthritis        Arthritis          Neurological:  Neurology Normal              Arthritis                           Endocrine:  Diabetes    Diabetes                       Psych:    depression              Physical Exam  General: Well nourished, Cooperative, Alert and Oriented    Airway:  Mallampati: II   Mouth Opening: Normal  TM Distance: Normal  Tongue: Normal  Neck ROM: Normal ROM    Dental:  Dentures      Anesthesia Plan  Type of Anesthesia, risks & benefits discussed:    Anesthesia Type: Gen ETT  Intra-op Monitoring Plan: Standard ASA Monitors  Post Op Pain Control Plan: multimodal analgesia and IV/PO Opioids PRN  Induction:  IV  Airway Plan: Direct, Post-Induction  Informed Consent: Informed consent signed with the Patient and all parties understand the risks and agree with anesthesia plan.  All questions answered.   ASA Score: 3  Day of Surgery Review of History & Physical: H&P Update referred to the surgeon/provider.    Ready For Surgery From Anesthesia Perspective.     .

## 2023-12-12 ENCOUNTER — ANESTHESIA (OUTPATIENT)
Dept: SURGERY | Facility: HOSPITAL | Age: 83
End: 2023-12-12
Payer: MEDICARE

## 2023-12-12 ENCOUNTER — HOSPITAL ENCOUNTER (OUTPATIENT)
Facility: HOSPITAL | Age: 83
Discharge: HOME | End: 2023-12-12
Attending: EMERGENCY MEDICINE | Admitting: EMERGENCY MEDICINE
Payer: MEDICARE

## 2023-12-12 VITALS
HEART RATE: 81 BPM | TEMPERATURE: 99 F | BODY MASS INDEX: 23.22 KG/M2 | WEIGHT: 136 LBS | HEIGHT: 64 IN | RESPIRATION RATE: 20 BRPM | OXYGEN SATURATION: 100 % | DIASTOLIC BLOOD PRESSURE: 72 MMHG | SYSTOLIC BLOOD PRESSURE: 157 MMHG

## 2023-12-12 DIAGNOSIS — R91.8 LUNG MASS: ICD-10-CM

## 2023-12-12 LAB
GRAM STN SPEC: NORMAL
GRAM STN SPEC: NORMAL
POCT GLUCOSE: 182 MG/DL (ref 70–110)
POCT GLUCOSE: 189 MG/DL (ref 70–110)

## 2023-12-12 PROCEDURE — 31654 BRONCH EBUS IVNTJ PERPH LES: CPT | Mod: GC,,, | Performed by: EMERGENCY MEDICINE

## 2023-12-12 PROCEDURE — 87205 SMEAR GRAM STAIN: CPT | Performed by: EMERGENCY MEDICINE

## 2023-12-12 PROCEDURE — 63600175 PHARM REV CODE 636 W HCPCS

## 2023-12-12 PROCEDURE — 27201423 OPTIME MED/SURG SUP & DEVICES STERILE SUPPLY: Performed by: EMERGENCY MEDICINE

## 2023-12-12 PROCEDURE — 88342 CHG IMMUNOCYTOCHEMISTRY: ICD-10-PCS | Mod: 26,,, | Performed by: PATHOLOGY

## 2023-12-12 PROCEDURE — 31629 PR BRONCHOSCOPY,TRANSBRON ASPIR BX: ICD-10-PCS | Mod: 59,LT,GC, | Performed by: EMERGENCY MEDICINE

## 2023-12-12 PROCEDURE — 88312 SPECIAL STAINS GROUP 1: CPT | Mod: 26,,, | Performed by: PATHOLOGY

## 2023-12-12 PROCEDURE — 88112 CYTOPATH CELL ENHANCE TECH: CPT | Mod: 59 | Performed by: PATHOLOGY

## 2023-12-12 PROCEDURE — 88177 CYTP FNA EVAL EA ADDL: CPT | Performed by: PATHOLOGY

## 2023-12-12 PROCEDURE — 71000044 HC DOSC ROUTINE RECOVERY FIRST HOUR: Performed by: EMERGENCY MEDICINE

## 2023-12-12 PROCEDURE — D9220A PRA ANESTHESIA: ICD-10-PCS | Mod: ,,, | Performed by: ANESTHESIOLOGY

## 2023-12-12 PROCEDURE — 88305 TISSUE EXAM BY PATHOLOGIST: CPT | Mod: 26,,, | Performed by: PATHOLOGY

## 2023-12-12 PROCEDURE — 31652 PR BRONCH W/ EBUS, SAMPLING 1 OR 2 NODES, INCL GUIDE: ICD-10-PCS | Mod: GC,,, | Performed by: EMERGENCY MEDICINE

## 2023-12-12 PROCEDURE — 88172 CYTP DX EVAL FNA 1ST EA SITE: CPT | Performed by: PATHOLOGY

## 2023-12-12 PROCEDURE — 88173 PR  INTERPRETATION OF FNA SMEAR: ICD-10-PCS | Mod: 26,,, | Performed by: PATHOLOGY

## 2023-12-12 PROCEDURE — 31629 BRONCHOSCOPY/NEEDLE BX EACH: CPT | Mod: 59,LT,GC, | Performed by: EMERGENCY MEDICINE

## 2023-12-12 PROCEDURE — 25000003 PHARM REV CODE 250: Performed by: EMERGENCY MEDICINE

## 2023-12-12 PROCEDURE — 88341 IMHCHEM/IMCYTCHM EA ADD ANTB: CPT | Mod: 26,,, | Performed by: PATHOLOGY

## 2023-12-12 PROCEDURE — 31628 BRONCHOSCOPY/LUNG BX EACH: CPT | Mod: 51,LT,GC, | Performed by: EMERGENCY MEDICINE

## 2023-12-12 PROCEDURE — 87116 MYCOBACTERIA CULTURE: CPT | Performed by: EMERGENCY MEDICINE

## 2023-12-12 PROCEDURE — 88305 TISSUE EXAM BY PATHOLOGIST: ICD-10-PCS | Mod: 26,,, | Performed by: PATHOLOGY

## 2023-12-12 PROCEDURE — 71000015 HC POSTOP RECOV 1ST HR: Performed by: EMERGENCY MEDICINE

## 2023-12-12 PROCEDURE — 36000708 HC OR TIME LEV III 1ST 15 MIN: Performed by: EMERGENCY MEDICINE

## 2023-12-12 PROCEDURE — 82962 GLUCOSE BLOOD TEST: CPT | Performed by: EMERGENCY MEDICINE

## 2023-12-12 PROCEDURE — 88312 PR  SPECIAL STAINS,GROUP I: ICD-10-PCS | Mod: 26,,, | Performed by: PATHOLOGY

## 2023-12-12 PROCEDURE — 31652 BRONCH EBUS SAMPLNG 1/2 NODE: CPT | Mod: GC,,, | Performed by: EMERGENCY MEDICINE

## 2023-12-12 PROCEDURE — 25000003 PHARM REV CODE 250

## 2023-12-12 PROCEDURE — 87070 CULTURE OTHR SPECIMN AEROBIC: CPT | Performed by: EMERGENCY MEDICINE

## 2023-12-12 PROCEDURE — 31654 PR BRONCH W/ EBUS, DIAG OR THERA INTERVENTION PERIPHERAL LESION(S), INCL GUID, ADD ON CODE: ICD-10-PCS | Mod: GC,,, | Performed by: EMERGENCY MEDICINE

## 2023-12-12 PROCEDURE — 31627 PR BRONCHOSCOPY,COMPUTER ASSIST/IMAGE-GUIDED NAVIGATION: ICD-10-PCS | Mod: GC,,, | Performed by: EMERGENCY MEDICINE

## 2023-12-12 PROCEDURE — 88173 CYTOPATH EVAL FNA REPORT: CPT | Mod: 59 | Performed by: PATHOLOGY

## 2023-12-12 PROCEDURE — 31624 DX BRONCHOSCOPE/LAVAGE: CPT | Mod: 51,LT,GC, | Performed by: EMERGENCY MEDICINE

## 2023-12-12 PROCEDURE — 88342 IMHCHEM/IMCYTCHM 1ST ANTB: CPT | Performed by: PATHOLOGY

## 2023-12-12 PROCEDURE — 88341 PR IHC OR ICC EACH ADD'L SINGLE ANTIBODY  STAINPR: ICD-10-PCS | Mod: 26,,, | Performed by: PATHOLOGY

## 2023-12-12 PROCEDURE — 87102 FUNGUS ISOLATION CULTURE: CPT | Performed by: EMERGENCY MEDICINE

## 2023-12-12 PROCEDURE — 88312 SPECIAL STAINS GROUP 1: CPT | Performed by: PATHOLOGY

## 2023-12-12 PROCEDURE — D9220A PRA ANESTHESIA: Mod: ,,, | Performed by: ANESTHESIOLOGY

## 2023-12-12 PROCEDURE — C1726 CATH, BAL DIL, NON-VASCULAR: HCPCS | Performed by: EMERGENCY MEDICINE

## 2023-12-12 PROCEDURE — 82962 GLUCOSE BLOOD TEST: CPT | Mod: 91 | Performed by: EMERGENCY MEDICINE

## 2023-12-12 PROCEDURE — 36000709 HC OR TIME LEV III EA ADD 15 MIN: Performed by: EMERGENCY MEDICINE

## 2023-12-12 PROCEDURE — 37000009 HC ANESTHESIA EA ADD 15 MINS: Performed by: EMERGENCY MEDICINE

## 2023-12-12 PROCEDURE — 88305 TISSUE EXAM BY PATHOLOGIST: CPT | Mod: 59 | Performed by: PATHOLOGY

## 2023-12-12 PROCEDURE — 88173 CYTOPATH EVAL FNA REPORT: CPT | Mod: 26,,, | Performed by: PATHOLOGY

## 2023-12-12 PROCEDURE — 87075 CULTR BACTERIA EXCEPT BLOOD: CPT | Performed by: EMERGENCY MEDICINE

## 2023-12-12 PROCEDURE — 31624 PR BRONCHOSCOPY,DIAG2STIC W LAVAGE: ICD-10-PCS | Mod: 51,LT,GC, | Performed by: EMERGENCY MEDICINE

## 2023-12-12 PROCEDURE — 88112 PR  CYTOPATH, CELL ENHANCE TECH: ICD-10-PCS | Mod: 26,59,, | Performed by: PATHOLOGY

## 2023-12-12 PROCEDURE — 37000008 HC ANESTHESIA 1ST 15 MINUTES: Performed by: EMERGENCY MEDICINE

## 2023-12-12 PROCEDURE — 88342 IMHCHEM/IMCYTCHM 1ST ANTB: CPT | Mod: 26,,, | Performed by: PATHOLOGY

## 2023-12-12 PROCEDURE — 88341 IMHCHEM/IMCYTCHM EA ADD ANTB: CPT | Mod: 59 | Performed by: PATHOLOGY

## 2023-12-12 PROCEDURE — 88112 CYTOPATH CELL ENHANCE TECH: CPT | Mod: 26,59,, | Performed by: PATHOLOGY

## 2023-12-12 PROCEDURE — 31628 PR BRONCHOSCOPY,TRANSBRONCH BIOPSY: ICD-10-PCS | Mod: 51,LT,GC, | Performed by: EMERGENCY MEDICINE

## 2023-12-12 PROCEDURE — 31627 NAVIGATIONAL BRONCHOSCOPY: CPT | Mod: GC,,, | Performed by: EMERGENCY MEDICINE

## 2023-12-12 PROCEDURE — 87206 SMEAR FLUORESCENT/ACID STAI: CPT | Performed by: EMERGENCY MEDICINE

## 2023-12-12 RX ORDER — HALOPERIDOL 5 MG/ML
0.5 INJECTION INTRAMUSCULAR EVERY 10 MIN PRN
Status: DISCONTINUED | OUTPATIENT
Start: 2023-12-12 | End: 2023-12-12 | Stop reason: HOSPADM

## 2023-12-12 RX ORDER — LIDOCAINE HYDROCHLORIDE 10 MG/ML
INJECTION, SOLUTION EPIDURAL; INFILTRATION; INTRACAUDAL; PERINEURAL
Status: DISCONTINUED | OUTPATIENT
Start: 2023-12-12 | End: 2023-12-12 | Stop reason: HOSPADM

## 2023-12-12 RX ORDER — ONDANSETRON 2 MG/ML
INJECTION INTRAMUSCULAR; INTRAVENOUS
Status: DISCONTINUED | OUTPATIENT
Start: 2023-12-12 | End: 2023-12-12

## 2023-12-12 RX ORDER — LIDOCAINE HYDROCHLORIDE 20 MG/ML
INJECTION, SOLUTION EPIDURAL; INFILTRATION; INTRACAUDAL; PERINEURAL
Status: DISCONTINUED | OUTPATIENT
Start: 2023-12-12 | End: 2023-12-12

## 2023-12-12 RX ORDER — DEXAMETHASONE SODIUM PHOSPHATE 4 MG/ML
INJECTION, SOLUTION INTRA-ARTICULAR; INTRALESIONAL; INTRAMUSCULAR; INTRAVENOUS; SOFT TISSUE
Status: DISCONTINUED | OUTPATIENT
Start: 2023-12-12 | End: 2023-12-12

## 2023-12-12 RX ORDER — ROCURONIUM BROMIDE 10 MG/ML
INJECTION, SOLUTION INTRAVENOUS
Status: DISCONTINUED | OUTPATIENT
Start: 2023-12-12 | End: 2023-12-12

## 2023-12-12 RX ORDER — PROPOFOL 10 MG/ML
INJECTION, EMULSION INTRAVENOUS CONTINUOUS PRN
Status: DISCONTINUED | OUTPATIENT
Start: 2023-12-12 | End: 2023-12-12

## 2023-12-12 RX ORDER — FENTANYL CITRATE 50 UG/ML
INJECTION, SOLUTION INTRAMUSCULAR; INTRAVENOUS
Status: DISCONTINUED | OUTPATIENT
Start: 2023-12-12 | End: 2023-12-12

## 2023-12-12 RX ORDER — PHENYLEPHRINE HYDROCHLORIDE 10 MG/ML
INJECTION INTRAVENOUS
Status: DISCONTINUED | OUTPATIENT
Start: 2023-12-12 | End: 2023-12-12

## 2023-12-12 RX ORDER — PROPOFOL 10 MG/ML
VIAL (ML) INTRAVENOUS
Status: DISCONTINUED | OUTPATIENT
Start: 2023-12-12 | End: 2023-12-12

## 2023-12-12 RX ORDER — SODIUM CHLORIDE 0.9 % (FLUSH) 0.9 %
10 SYRINGE (ML) INJECTION
Status: DISCONTINUED | OUTPATIENT
Start: 2023-12-12 | End: 2023-12-12 | Stop reason: HOSPADM

## 2023-12-12 RX ORDER — FENTANYL CITRATE 50 UG/ML
25 INJECTION, SOLUTION INTRAMUSCULAR; INTRAVENOUS EVERY 5 MIN PRN
Status: DISCONTINUED | OUTPATIENT
Start: 2023-12-12 | End: 2023-12-12 | Stop reason: HOSPADM

## 2023-12-12 RX ADMIN — PHENYLEPHRINE HYDROCHLORIDE 100 MCG: 10 INJECTION INTRAVENOUS at 07:12

## 2023-12-12 RX ADMIN — PROPOFOL 30 MG: 10 INJECTION, EMULSION INTRAVENOUS at 08:12

## 2023-12-12 RX ADMIN — SUGAMMADEX 200 MG: 100 INJECTION, SOLUTION INTRAVENOUS at 08:12

## 2023-12-12 RX ADMIN — PROPOFOL 170 MG: 10 INJECTION, EMULSION INTRAVENOUS at 06:12

## 2023-12-12 RX ADMIN — PROPOFOL 75 MCG/KG/MIN: 10 INJECTION, EMULSION INTRAVENOUS at 06:12

## 2023-12-12 RX ADMIN — FENTANYL CITRATE 50 MCG: 50 INJECTION, SOLUTION INTRAMUSCULAR; INTRAVENOUS at 06:12

## 2023-12-12 RX ADMIN — FENTANYL CITRATE 50 MCG: 50 INJECTION, SOLUTION INTRAMUSCULAR; INTRAVENOUS at 07:12

## 2023-12-12 RX ADMIN — ROCURONIUM BROMIDE 40 MG: 10 INJECTION INTRAVENOUS at 06:12

## 2023-12-12 RX ADMIN — ONDANSETRON 4 MG: 2 INJECTION INTRAMUSCULAR; INTRAVENOUS at 08:12

## 2023-12-12 RX ADMIN — SODIUM CHLORIDE: 0.9 INJECTION, SOLUTION INTRAVENOUS at 06:12

## 2023-12-12 RX ADMIN — DEXAMETHASONE SODIUM PHOSPHATE 4 MG: 4 INJECTION, SOLUTION INTRAMUSCULAR; INTRAVENOUS at 07:12

## 2023-12-12 RX ADMIN — PROPOFOL 30 MG: 10 INJECTION, EMULSION INTRAVENOUS at 07:12

## 2023-12-12 RX ADMIN — LIDOCAINE HYDROCHLORIDE 40 MG: 20 INJECTION, SOLUTION EPIDURAL; INFILTRATION; INTRACAUDAL; PERINEURAL at 06:12

## 2023-12-12 NOTE — ANESTHESIA PROCEDURE NOTES
Intubation    Date/Time: 12/12/2023 7:00 AM    Performed by: Stacy Blanton MD  Authorized by: Herlinda Marin MD    Intubation:     Induction:  Intravenous    Intubated:  Postinduction    Mask Ventilation:  Easy with oral airway    Attempts:  1    Attempted By:  Resident anesthesiologist    Method of Intubation:  Video laryngoscopy    Blade:  Hoffman 3    Laryngeal View Grade: Grade I - full view of cords      Difficult Airway Encountered?: No      Complications:  None    Airway Device:  Oral endotracheal tube    Airway Device Size:  8.0    Style/Cuff Inflation:  Cuffed (inflated to minimal occlusive pressure)    Inflation Amount (mL):  8    Tube secured:  22    Secured at:  The teeth    Placement Verified By:  Capnometry and Revisualization with laryngoscopy    Complicating Factors:  None    Findings Post-Intubation:  BS equal bilateral and atraumatic/condition of teeth unchanged

## 2023-12-12 NOTE — DISCHARGE SUMMARY
[facility] - [facility] (2nd Fl)  Discharge Note  Short Stay    Procedure(s) (LRB):  ROBOTIC BRONCHOSCOPY (N/A)      OUTCOME: Patient tolerated treatment/procedure well without complication and is now ready for discharge.    DISPOSITION: Home or Self Care    FINAL DIAGNOSIS:  Lung Mass    FOLLOWUP: In clinic    DISCHARGE INSTRUCTIONS:    Discharge Procedure Orders   Diet general     Call MD for:  temperature >101     Call MD for:  coughing up blood greater than 3 tablespoons in volume     Call MD for:  chest pain     Call MD for:  difficulty breathing or shortness of breath     Call MD for:  development of yellow/green sputum        TIME SPENT ON DISCHARGE: 15 minutes

## 2023-12-12 NOTE — ANESTHESIA POSTPROCEDURE EVALUATION
Anesthesia Post Evaluation    Patient: Whit Sloan    Procedure(s) Performed: Procedure(s) (LRB):  ROBOTIC BRONCHOSCOPY (N/A)    Final Anesthesia Type: general      Patient location during evaluation: PACU  Patient participation: Yes- Able to Participate  Level of consciousness: awake and alert and oriented  Post-procedure vital signs: reviewed and stable  Pain management: adequate  Airway patency: patent    PONV status at discharge: No PONV  Anesthetic complications: no      Cardiovascular status: hemodynamically stable  Respiratory status: unassisted and spontaneous ventilation  Hydration status: euvolemic  Follow-up not needed.          Vitals Value Taken Time   /72 12/12/23 0935   Temp 37 °C (98.6 °F) 12/12/23 0900   Pulse 82 12/12/23 0936   Resp 22 12/12/23 0936   SpO2 99 % 12/12/23 0936   Vitals shown include unvalidated device data.      No case tracking events are documented in the log.      Pain/Taj Score: Taj Score: 10 (12/12/2023  9:39 AM)

## 2023-12-12 NOTE — DISCHARGE INSTRUCTIONS
BRONCHOSCOPY:    ACTIVITY LEVEL:  If you received sedation or an anesthetic, you may feel sleepy for several hours. Do not drive, operate  machinery, make critical decisions, or perform activities that require coordination or balance until tomorrow  morning. Please have a responsible person stay with you for at least two (2) hours after you leave the hospital.  DIET:  Do not eat or drink anything until __________. Once you can drink clear liquids without coughing, you can  resume your regular diet.  WHAT you may expect over the next 24 hours:   You may experience a low grade fever.   You may cough up streaks of blood.   Take Tylenol as directed for comfort/fever.  Additional Instructions:   Do not take Aspirin, ibuprofen, naproxen, or any medications containing these items for _____ days  after the bronchoscopy.   If you normally take Coumadin or aspirin, you may restart on _____________________________.  COME TO THE EMERGENCY DEPARTMENT IF:   You cough up more than one (1) tablespoon of blood.   You have fever over 101°F (38.4°C) for more than one evening.   You experience shortness of breath that is of new onset, or that is increased from your usual baseline.   You experience chest pain.   You have chills.  RETURN APPOINTMENT: Follow up as directed.____________________________________________  FOR EMERGENCIES:  If any unusual problems or difficulties occur, contact ______________________________ or the resident at  [phone] (page ) or at the Clinic office, [phone] or [phone].

## 2023-12-12 NOTE — H&P
"Pulmonary & Critical Care Medicine   History & Physical     HPI: Patient of Dr. Diaz- "Whit Sloan is a 83 y.o. female who presents for evaluation of a lung mass. Lung mass was found on a CT chest which was done to evaluate new symptoms. The pt has been experiencing new symptoms that include weight loss. Also having left neck/scapula pain x3 months. Also had two lumps on her upper back that showed up ~3 months ago. She is experiencing shortness of breath x1 month"     Underwent FOB - Non-diagnostic. Discussed with Dr. Diaz and will plan to move forward with robotic bronchoscopy.   Here today for procedure     Chest pain on left- 2/2 mass.. Ongoing for months.   Breathing at baseline.   Daughter present.   No OAC/Anti-PLT       Past Medical History:   Diagnosis Date    Breast cancer 2016    Right breast    CKD (chronic kidney disease) stage 3, GFR 30-59 ml/min     Depressive disorder, not elsewhere classified     Disorder of bone and cartilage, unspecified     Hypertension     Malignant neoplasm of lower-inner quadrant of right female breast 2016    Other and unspecified hyperlipidemia     Recurrent nephrolithiasis     Type II or unspecified type diabetes mellitus without mention of complication, uncontrolled      Past Surgical History:   Procedure Laterality Date    BREAST BIOPSY Right 2016    BREAST LUMPECTOMY Right 2016    BRONCHOSCOPY N/A 2023    Procedure: BRONCHOSCOPY;  Surgeon: Provider, Dosc Diagnostic;  Location: [facility] OR [address] [place] [address];  Service: Anesthesiology;  Laterality: N/A;    HYSTERECTOMY      one ovary/left    OOPHORECTOMY      one     Social History:   Social History     Socioeconomic History    Marital status:    Tobacco Use    Smoking status: Former     Current packs/day: 0.00     Types: Cigarettes     Quit date: 1994     Years since quittin.3    Smokeless tobacco: Never   Substance and Sexual Activity    Alcohol use: No    Drug use: No    Sexual activity: Not " Currently     Family History   Problem Relation Age of Onset    Heart disease Mother     Diabetes Mother     Diabetes Sister         2  with diabetes/amputation    Cancer Brother         lung     Drug Allergies:   Review of patient's allergies indicates:  No Known Allergies        Review of Systems:   A comprehensive 12-point review of systems was performed, and is negative except for those items mentioned above in the HPI section of this note.     Vital Signs:    Vitals:    23 0535   BP: (!) 179/79   Pulse: 105   Resp: 20   Temp: 98.9 °F (37.2 °C)       Physical Exam:   General: no distress  Eyes:  conjunctivae/corneas clear  Nose: no discharge  Neck: trachea midline with no masses appreciated  Lungs:  normal respiratory effort, no wheezes, no rales  Heart: regular rate and rhythm and no murmur  Abdomen: non-distended  Extremities: no cyanosis, no edema, no clubbing  Skin: No rashes or lesions. good skin turgor  Neurologic: alert, oriented, thought content appropriate    Personal Review and Summary of Prior Diagnostics    Laboratory Studies:   Reviewed     Microbiology Data:   Reviewed     Summary of Chest Imaging Personally Reviewed:     CT Chest- Redemonstration large masslike area centered at the left upper lobe and lingula.  Component of nearby or surrounding parenchymal volume loss difficult to exclude with overall area measuring 9.9 x 7.2 cm (series 7, image 268), previously 8.7 x 7.1 cm by my measurement on comparison CT.  Continued extension to the pleural margin and left aspect of the mediastinum without rob invasion.  Redemonstration of spiculated opacity at the right lower lobe measuring 2.4 x 1.7 cm (series 8, image 95), similar by my measurement.  Few other subcentimeter pulmonary nodules, technically indeterminate.  No pleural effusion.       NM PET-   In the chest, there is a 9.5 X 7.0 cm mass in the lingula of the left lung which abuts the right cardiac margin and lateral chest wall  without rob invasion.  It exhibits peripheral hypermetabolic activity with an SUV max of 18.  There is an additional 1.1 x 0.9 cm hypermetabolic pleural-based nodule (SUV max 3.3) in the right lower lobe (axial image 88).     There are no pathologically enlarged or hypermetabolic lymph nodes.     In the abdomen and pelvis, there is short segment wall thickening and hypermetabolic activity (SUV max 8.8) within the descending colon (image 178).     There is physiologic tracer distribution within the abdominal organs and excretion into the genitourinary system.     In the bones, there are no hypermetabolic lesions worrisome for malignancy.     In the extremities, there are no hypermetabolic lesions worrisome for malignancy.    2D Echo:     Left Ventricle: The left ventricle is normal in size. Normal wall thickness. Normal wall motion. There is normal systolic function. Ejection fraction by visual approximation is 70%. Grade I diastolic dysfunction.    Right Ventricle: Normal right ventricular cavity size. Systolic function is normal.    Aortic Valve: The aortic valve is a trileaflet valve. There is mild aortic valve sclerosis.    Mitral Valve: There is moderate mitral annular calcification present. There is no stenosis.    Pulmonic Valve: There is no stenosis.    Aorta: Atherosclerosis of the ascending aorta.    IVC/SVC: Normal venous pressure at 3 mmHg.    Pericardium: There is a trivial effusion. No indication of cardiac tamponade.    PFT's: Reviewed     Impression & Recommendations      Lung mass left- Here for robotic + EBUS   -- Plan for cyto + cultures.     Discussed procedure in detail with patient and daughter. Risks including bleeding, respiratory failure, PTX, non-diagnostic specimen, need for additional procedures and numerous additional potential complications up to death outlined. They verbalized understanding and all questions answered to their satisfaction. Written consent signed and on chart.      Denzel Malone MD   [facility]

## 2023-12-12 NOTE — TRANSFER OF CARE
"Anesthesia Transfer of Care Note    Patient: Whit Sloan    Procedure(s) Performed: Procedure(s) (LRB):  ROBOTIC BRONCHOSCOPY (N/A)    Patient location: PACU    Anesthesia Type: general    Transport from OR: Transported from OR on 6-10 L/min O2 by face mask with adequate spontaneous ventilation    Post pain: adequate analgesia    Post assessment: no apparent anesthetic complications and tolerated procedure well    Post vital signs: stable    Level of consciousness: awake, alert and oriented    Nausea/Vomiting: no nausea/vomiting    Complications: none    Transfer of care protocol was followed      Last vitals: Visit Vitals  BP (!) 159/73   Pulse 80   Temp 36.5 °C (97.7 °F) (Temporal)   Resp 20   Ht 5' 4" (1.626 m)   Wt 61.7 kg (136 lb)   LMP  (LMP Unknown)   SpO2 100%   Breastfeeding No   BMI 23.34 kg/m²     "

## 2023-12-15 LAB — BACTERIA SPEC AEROBE CULT: NO GROWTH

## 2023-12-19 LAB
BACTERIA SPEC ANAEROBE CULT: NORMAL
COMMENT: ABNORMAL
FINAL PATHOLOGIC DIAGNOSIS: ABNORMAL
Lab: ABNORMAL
MICROSCOPIC EXAM: ABNORMAL

## 2023-12-20 ENCOUNTER — TELEPHONE (OUTPATIENT)
Dept: PULMONOLOGY | Facility: CLINIC | Age: 83
End: 2023-12-20
Payer: MEDICARE

## 2023-12-20 DIAGNOSIS — G89.3 NEOPLASM RELATED PAIN: ICD-10-CM

## 2023-12-20 DIAGNOSIS — R52 PAIN: ICD-10-CM

## 2023-12-20 RX ORDER — HYDROCODONE BITARTRATE AND ACETAMINOPHEN 5; 325 MG/1; MG/1
1 TABLET ORAL EVERY 4 HOURS PRN
Qty: 180 TABLET | Refills: 0 | Status: SHIPPED | OUTPATIENT
Start: 2023-12-20 | End: 2024-01-20

## 2023-12-20 NOTE — TELEPHONE ENCOUNTER
Patient's daughter called and states that patient has been taking hydrocodone every 6 hours(4 tabs per day). States that pain medication is effective but  wears off before 6 hours is up. Informed her that she can take it every 4hours as needed. Verbalized understanding. Requests new rx to main campus.

## 2023-12-20 NOTE — TELEPHONE ENCOUNTER
"Spoke with the patient & her daughter yesterday & again today. We discussed the biopsy results that do show scant malignant cells but insufficient to characterize the malignancy.    Unfortunately, the pt's pain is worse. She has become very weak. She has deteriorated significantly since our last visit.    We discussed two options:  1 - Repeat biopsy with IR & referral to oncology for treatment, and  2 - Enroll in home hospice & not pursue treatment at this time.    The pt's daughter stated, "She is too weak to do anything right now. I am going to start giving her Glucerna & let her rest to see if she can get her strength up. She is too weak to do another biopsy right now."    I will send a message to CORNELIA Candelaria with palliative to see about adjusting her pain medications & update her on pt's current status. The pt will have a visit in-person on Aramis 3 to discuss further.    Dae Diaz MD  [facility]  "

## 2023-12-21 RX ORDER — SENNOSIDES 8.6 MG/1
1 TABLET ORAL NIGHTLY
Qty: 30 TABLET | Refills: 0 | Status: SHIPPED | OUTPATIENT
Start: 2023-12-21 | End: 2024-01-27

## 2024-01-03 ENCOUNTER — OFFICE VISIT (OUTPATIENT)
Dept: PULMONOLOGY | Facility: CLINIC | Age: 84
End: 2024-01-03
Payer: MEDICARE

## 2024-01-03 VITALS
OXYGEN SATURATION: 98 % | HEART RATE: 112 BPM | DIASTOLIC BLOOD PRESSURE: 65 MMHG | SYSTOLIC BLOOD PRESSURE: 106 MMHG | WEIGHT: 135.81 LBS | BODY MASS INDEX: 23.31 KG/M2

## 2024-01-03 DIAGNOSIS — C34.12 MALIGNANT NEOPLASM OF UPPER LOBE OF LEFT LUNG: Primary | ICD-10-CM

## 2024-01-03 DIAGNOSIS — Z71.89 GOALS OF CARE, COUNSELING/DISCUSSION: ICD-10-CM

## 2024-01-03 DIAGNOSIS — G89.3 CANCER ASSOCIATED PAIN: ICD-10-CM

## 2024-01-03 PROCEDURE — 99999 PR PBB SHADOW E&M-EST. PATIENT-LVL III: CPT | Mod: PBBFAC,,, | Performed by: INTERNAL MEDICINE

## 2024-01-03 PROCEDURE — 99213 OFFICE O/P EST LOW 20 MIN: CPT | Mod: PBBFAC,PN | Performed by: INTERNAL MEDICINE

## 2024-01-03 PROCEDURE — 99214 OFFICE O/P EST MOD 30 MIN: CPT | Mod: S$PBB,,, | Performed by: INTERNAL MEDICINE

## 2024-01-03 RX ORDER — ESCITALOPRAM OXALATE 5 MG/1
5 TABLET ORAL
COMMUNITY
Start: 2023-12-03

## 2024-01-03 RX ORDER — SODIUM BICARBONATE 325 MG/1
1 TABLET ORAL 2 TIMES DAILY
COMMUNITY
Start: 2023-11-28 | End: 2024-11-27

## 2024-01-03 NOTE — PROGRESS NOTES
"Follow Up  [facility]    SUBJECTIVE:     Chief Complaint:   Lung mass    History of Present Illness:  Whit Sloan is a 83 y.o. female who presents for follow up evaluation of a lung mass. Lung mass was found on a CT chest which was done to evaluate new symptoms. The pt has been experiencing new symptoms that include weight loss. Also having left neck/scapula pain x3 months. Also had two lumps on her upper back that showed up ~3 months ago. She is experiencing shortness of breath. She is not bothered by cough symptoms- "I cough every now and then, but not much."     Pt is here with family today. Since her last visit, pt reports that her pain worsened. Palliative titrated pain medications with improved pain control. Pt feels severely weak & fatigued. Pt reports loss of appetitie. Pt reports deterioration of functional status. For example, she needs a bedside commode now & has meals brought to her rather than ambulating to the dining table.    History of right breast cancer 5/20/16 diagnosed with stereotactic biopsy. Invasive ductal carcinoma, well-differentiated with mucinous features. ER - Positive (100%, strong). [place] - Positive (100%, strong). She underwent surgical lumpectomy with negative SL. She then took tamoxifen for five years. No recurrence.    She smoked maybe 1/2 PPD from age 19 - 49.    PMH: CKD, DM2, breast cancer    Review of patient's allergies indicates:  No Known Allergies    Past Medical History:   Diagnosis Date    Breast cancer 2016    Right breast    CKD (chronic kidney disease) stage 3, GFR 30-59 ml/min     Depressive disorder, not elsewhere classified     Disorder of bone and cartilage, unspecified     Hypertension     Malignant neoplasm of lower-inner quadrant of right female breast 11/11/2016    Other and unspecified hyperlipidemia     Recurrent nephrolithiasis     Type II or unspecified type diabetes mellitus without mention of complication, uncontrolled      Past Surgical " History:   Procedure Laterality Date    BREAST BIOPSY Right 2016    BREAST LUMPECTOMY Right 2016    BRONCHOSCOPY N/A 2023    Procedure: BRONCHOSCOPY;  Surgeon: Provider, [facility];  [facility];  Service: Anesthesiology;  Laterality: N/A;    HYSTERECTOMY      one ovary/left    OOPHORECTOMY      one    ROBOTIC BRONCHOSCOPY N/A 2023    Procedure: ROBOTIC BRONCHOSCOPY;  Surgeon: Denzel Malone MD;  Location: [facility] OR [address] [place] [address];  Service: Pulmonary;  Laterality: N/A;     Family History   Problem Relation Age of Onset    Heart disease Mother     Diabetes Mother     Diabetes Sister         2  with diabetes/amputation    Cancer Brother         lung     Social History     Socioeconomic History    Marital status:    Tobacco Use    Smoking status: Former     Current packs/day: 0.00     Types: Cigarettes     Quit date: 1994     Years since quittin.3    Smokeless tobacco: Never   Substance and Sexual Activity    Alcohol use: No    Drug use: No    Sexual activity: Not Currently     Review of Systems:  See HPI    OBJECTIVE:     Vital Signs  Vitals:    24 1329   BP: 106/65   BP Location: Left arm   Patient Position: Sitting   BP Method: Medium (Automatic)   Pulse: (!) 112   SpO2: 98%   Weight: 61.6 kg (135 lb 12.9 oz)     Body mass index is 23.31 kg/m².    Physical Exam:  General: no distress  Eyes:  conjunctivae/corneas clear  Nose: no discharge  Neck: trachea midline with no masses appreciated  Lungs:  normal respiratory effort, no wheezes, no rales  Heart: regular rate and rhythm and no murmur  Abdomen: non-distended  Extremities: no cyanosis, no edema, no clubbing  Skin: No rashes or lesions. good skin turgor  Neurologic: alert, oriented, thought content appropriate    Laboratory:  Lab Results   Component Value Date    WBC 8.50 10/27/2023    HGB 10.9 (L) 10/27/2023    HCT 35.4 (L) 10/27/2023    MCV 86 10/27/2023     10/27/2023     Chest Imaging, My  Impression:   CT chest 10/2023: There is a peripheral left upper lobe lung mass in the lingula measuring 7.7 x 7.9 cm. Small pericardial effusion with complex appearance. There is a spiculated 1.2 x 2.1 cm nodule in the right lower lobe.     CT abd/pelvis: There is a 3.2 cm by 1.8 cm hypodense lesion in the pancreatic head without pancreatic ductal dilatation. This may reflect cystic or solid mass.    ASSESSMENT/PLAN:     Malignancy of the Left Lung & Goals of Care Counseling/Discussion  - Pathology unable to identify cell type with tissue available to date. Discussed option to refer to oncology & repeat biopsy with IR vs enrolling in home hospice.  - Pt is here with family today. Since her last visit, pt reports that her pain has worsened. Palliative titrated pain medications with improved pain control. Pt feels severely weak & fatigued. Pt reports loss of appetitie. Pt reports deterioration of functional status. For example, she needs a bedside commode now & has meals brought to her rather than ambulating to the dining table.  - Extensive discussion held with pt & family. Pt reports feeling satisfied with her life, & she does not feel like she has any unfinished business. Pt doesn't want to undergo more invasive testing or procedures; particularly because she feels like she just doesn't have any energy. Pt wants to know what to expect at the end of life & wants to have a plan in place how she can help manage symptoms as they arise. Pt is ready for home hospice. Pt will discuss this with palliative care at her upcoming appt in two weeks.    Pain secondary to cancer  - Appreciate assistance from palliative care.    Total professional time spent for the encounter: 30 minutes  Time was spent preparing to see the patient, reviewing results of prior testing, obtaining and/or reviewing separately obtained history, performing a medically appropriate examination and interview, counseling and educating the patient/family,  ordering medications/tests/procedures, referring and communicating with other health care professionals, documenting clinical information in the electronic health record, and independently interpreting results.    [place] Kot[place], MD  [facility]

## 2024-01-12 ENCOUNTER — TELEPHONE (OUTPATIENT)
Dept: PULMONOLOGY | Facility: CLINIC | Age: 84
End: 2024-01-12
Payer: MEDICARE

## 2024-01-12 ENCOUNTER — TELEPHONE (OUTPATIENT)
Dept: PRIMARY CARE CLINIC | Facility: CLINIC | Age: 84
End: 2024-01-12
Payer: MEDICARE

## 2024-01-12 NOTE — TELEPHONE ENCOUNTER
Pt daughter is asking for 02 and also asking for bed side commode.pt daughter stated that pulmonology was supposed to get her some oxygen but didn't.  Pt daughter stated that they didn't start home health yet and she needs these things because her mother is weak and unable to ambulate as much.  Is it possible for you to put the orders in?

## 2024-01-12 NOTE — TELEPHONE ENCOUNTER
Talked to patient daughter, pt daughter stated that she tried getting into pulmonary because pallitive care stated they needed a o2 test befire giving her home o2.  Advised her to contact pulmonary before we can do anything. Pt will contact us or ems if she is unable to get in with pulmonary

## 2024-01-12 NOTE — TELEPHONE ENCOUNTER
I called and spoke to Mrs Sloan's daughter Aracely about Mrs Sloan's shortness of breath. She would like to have oxygen for her and they will be here on Wednesday seeing  Palliative Care.She would like to see Dr Diaz and do any testing needed for the oxygen. I told Aracely I will speak to Dr Diaz and call her back on Tuesday. She agreed. Julia Hartley LPN

## 2024-01-12 NOTE — TELEPHONE ENCOUNTER
----- Message from Citlalli Frias sent at 1/12/2024  1:31 PM CST -----  Contact: Daughter, Aracely, [phone]  Calling to request an order for oxygen. Please advise. Thanks.

## 2024-01-12 NOTE — TELEPHONE ENCOUNTER
----- Message from Citlalli Frias sent at 1/12/2024  1:33 PM CST -----  Contact: Daughter, Aracely, [phone]  Calling to request an order for a bedside commode. Please advise. Thanks.

## 2024-01-15 LAB — FUNGUS SPEC CULT: NORMAL

## 2024-01-17 ENCOUNTER — OFFICE VISIT (OUTPATIENT)
Dept: PALLIATIVE MEDICINE | Facility: CLINIC | Age: 84
End: 2024-01-17
Payer: MEDICARE

## 2024-01-17 DIAGNOSIS — Z51.5 ENCOUNTER FOR PALLIATIVE CARE: Primary | ICD-10-CM

## 2024-01-17 DIAGNOSIS — Z71.89 ENCOUNTER FOR HOSPICE CARE DISCUSSION: ICD-10-CM

## 2024-01-17 DIAGNOSIS — R91.8 LUNG MASS: ICD-10-CM

## 2024-01-17 DIAGNOSIS — Z71.89 GOALS OF CARE, COUNSELING/DISCUSSION: ICD-10-CM

## 2024-01-17 DIAGNOSIS — C80.1 CANCER: ICD-10-CM

## 2024-01-17 PROCEDURE — 99215 OFFICE O/P EST HI 40 MIN: CPT | Mod: 95,,, | Performed by: NURSE PRACTITIONER

## 2024-01-17 NOTE — PROGRESS NOTES
The patient location is: home  The chief complaint leading to consultation is: hospice discussion    Visit type: audiovisual    Face to Face time with patient: 30 minutes    60 minutes of total time spent on the encounter, which includes face to face time and non-face to face time preparing to see the patient (eg, review of tests), Obtaining and/or reviewing separately obtained history, Documenting clinical information in the electronic or other health record, Independently interpreting results (not separately reported) and communicating results to the patient/family/caregiver, or Care coordination (not separately reported).       Each patient to whom he or she provides medical services by telemedicine is:  (1) informed of the relationship between the physician and patient and the respective role of any other health care provider with respect to management of the patient; and (2) notified that he or she may decline to receive medical services by telemedicine and may withdraw from such care at any time.    Notes:       Consult Note  Palliative Care      Consult Requested By: No ref. provider found  Reason for Consult: symptom mgmt/ACP      ASSESSMENT/PLAN:     Plan/Recommendations:    01/19/2024:  - referred to hospice    Lung mass   - seen by pulmonologist, Dr. Diaz  - concern for neoplasm, PET scan scheduled for 11/14/2023    Encounter for palliative care  - Patient is decisional  - Patient accompanied today by her daughter Sylvie  - ACP documents are not uploaded into EMR, reviewed packet at initial clinic visit. Patient will discuss with family at home, will complete documents at future visit  - Philosophy of Palliative Medicine reviewed with patient and family at first visit  - New patient folder given to and reviewed with patient and family at first visit  - Goals of care: Limited exploration, pending initial PET scan     Shoulder pain, bilateral/neck pain  - x 3 months, severe  - pain is concentrated in left  "scapular region extending up left-side neck to ear, across back wrapping around to right shoulder  - initially thought to be arthritic pain, patient previously referred to PT/OT & ortho by PCP  - dc'd methocarbomol, ineffective  - has been using daily doses of tylenol as high as > 6,000 mg x 10 days with partial relief, discussed with patient and daughter danger associated with high doses of tylenol and informed of recommendation to restrict < 3,000 mg daily. Will discontinue use of additional doses of tylenol with addition of norco today  - start norco 5 mg q 6 hrs, PRN  - daughter/patient prefers conservative use of pain medication    - will consider med for neuropathic pain at future visit   - narcan ordered   - senna started  - will continue to monitor     Dyspnea  - w activity   -x 1 month  - recovers well w frequent rest breaks  - will continue to monitor     Anorexia   - reports rapid weight loss  - at initial visit, daughter voices concern that lexapro, now dc'd caused this ongoing SE  - daughter voices concern with patient's lack of interest in food   - though formal diagnosis is TDB due to pending PET, explained this is a common SE for patients with advanced cancer  - referred to nutrition on 11/13  - will continue to monitor     Understanding of illness: limited without formal diagnosis     Goals of care: TBD    Follow up: none needed    SUBJECTIVE:     History of Present Illness:  Patient is a 83 y.o. year old female presenting with lung mass. Please see chart for detailed history.     01/17/2024:  OLGA ARAMBULA reviewed      Patient presents via virtual visit. Since our previous visit, her functional status has declined considerably and her symptoms have worsened, most notably pain and dyspnea at rest. Patient had GOC discussion on 01/4/2024 with Dr. Diaz where she reported: "feeling satisfied with her life, & she does not feel like she has any unfinished business. Pt doesn't want to undergo more invasive " "testing or procedures; particularly because she feels like she just doesn't have any energy." At that time, patient decided she is ready for home hospice. The purpose of today's appointment, per MD, was to discuss hospice and place referral. [facility] BETITO KAELTegan Hakan joined, see separate note for details. Patient and daughter's questions answered to their satisfaction, hospice referral was placed.     11/13/2023:  OLGA ARAMBULA reviewed and summarized: no entries     Patient presents to initial clinic visit with her daughter, Sylvie. [facility]. Patient was referred to [facility] by pulmonologist subsequent to discovery of lung mass concerning for cancer. She was initially diagnosed with arthritis, the mass was an incidental finding from a shoulder x-ray. Additionally she has multiple soft tissue masses on her left back that were previously characterized as lipomas. Has PET scan scheduled for tomorrow and bronchoscopy scheduled for 11/14. Patient's primary concern is severe pain mostly located in upper back and shoulders. Her daughter has been giving her high doses of tylenol well above recommended range, instructed her to discontinue. Additionally, the tylenol has been minimally effective. Started trial of low-dose norco today. Reports poor appetite and significant weight loss, fatigue and shortness of breath. Patient's daughter is intermittently tearful during appt as she expresses worry and protectiveness for her mother. They have a clear understanding that pt may be diagnosed with cancer. Patient states that she is strong and will handle anything that comes her way.     Past Medical History:   Diagnosis Date    Breast cancer 2016    Right breast    CKD (chronic kidney disease) stage 3, GFR 30-59 ml/min     Depressive disorder, not elsewhere classified     Disorder of bone and cartilage, unspecified     Hypertension     Malignant neoplasm of lower-inner quadrant of right " female breast 2016    Other and unspecified hyperlipidemia     Recurrent nephrolithiasis     Type II or unspecified type diabetes mellitus without mention of complication, uncontrolled      Past Surgical History:   Procedure Laterality Date    BREAST BIOPSY Right 2016    BREAST LUMPECTOMY Right 2016    BRONCHOSCOPY N/A 2023    Procedure: BRONCHOSCOPY;  Surgeon: Provider, [facility];  Location: [facility] OR [address] [place] [address];  Service: Anesthesiology;  Laterality: N/A;    HYSTERECTOMY      one ovary/left    OOPHORECTOMY      one    ROBOTIC BRONCHOSCOPY N/A 2023    Procedure: ROBOTIC BRONCHOSCOPY;  Surgeon: Denzel Malone MD;  Location: [facility] OR [address] [place] [address];  Service: Pulmonary;  Laterality: N/A;     Family History   Problem Relation Age of Onset    Heart disease Mother     Diabetes Mother     Diabetes Sister         2  with diabetes/amputation    Cancer Brother         lung     Review of patient's allergies indicates:  No Known Allergies    Medications:    Current Outpatient Medications:     allopurinoL (ZYLOPRIM) 100 MG tablet, Take 1 tablet by mouth once daily., Disp: , Rfl:     diclofenac sodium (VOLTAREN) 1 % Gel, Apply 1 inch topically., Disp: , Rfl:     dorzolamide-timolol 2-0.5% (COSOPT) 22.3-6.8 mg/mL ophthalmic solution, Place 1 drop into the right eye 2 (two) times daily., Disp: , Rfl:     EScitalopram oxalate (LEXAPRO) 5 MG Tab, Take 5 mg by mouth., Disp: , Rfl:     ferrous sulfate (FEOSOL) 325 mg (65 mg iron) Tab tablet, Take 1 tablet by mouth every Mon, Wed, Fri., Disp: , Rfl:     finerenone (KERENDIA) 10 mg Tab, Take 10 mg by mouth., Disp: , Rfl:     glipiZIDE (GLUCOTROL) 10 MG tablet, TAKE 1 TABLET BY MOUTH TWICE A DAY BEFORE MEALS, Disp: 180 tablet, Rfl: 1    HYDROcodone-acetaminophen (NORCO) 5-325 mg per tablet, Take 1 tablet by mouth every 4 (four) hours as needed for Pain., Disp: 180 tablet, Rfl: 0    insulin detemir U-100, Levemir, (LEVEMIR FLEXPEN) 100 unit/mL (3 mL) InPn pen,  "Inject 5 Units into the skin every evening., Disp: 3 mL, Rfl: 3    JANUVIA 50 mg Tab, TAKE 1 TABLET BY MOUTH EVERY DAY, Disp: 90 tablet, Rfl: 1    latanoprost 0.005 % ophthalmic solution, Place 1 drop into the right eye nightly., Disp: , Rfl:     pen needle, diabetic 29 gauge x 1/2" Ndle, 1 Needle by Misc.(Non-Drug; Combo Route) route every evening., Disp: 100 each, Rfl: 1    SENNA 8.6 mg tablet, Take 1 tablet by mouth every evening., Disp: 30 tablet, Rfl: 0    simvastatin (ZOCOR) 40 MG tablet, TAKE 1 TABLET BY MOUTH EVERY DAY IN THE EVENING, Disp: 90 tablet, Rfl: 1    sodium bicarbonate 325 MG tablet, Take 1 tablet by mouth 2 (two) times daily., Disp: , Rfl:     timolol maleate 0.5% (TIMOPTIC) 0.5 % Drop, , Disp: , Rfl:     vitamin D (VITAMIN D3) 1000 units Tab, Take 2,000 Units by mouth., Disp: , Rfl:     OBJECTIVE:       ROS:  Review of Systems   Constitutional:  Positive for activity change, appetite change, fatigue and unexpected weight change.   HENT:  Positive for ear pain.    Respiratory:  Negative for shortness of breath, wheezing and stridor.    Cardiovascular:  Negative for chest pain, palpitations and leg swelling.   Gastrointestinal:  Negative for abdominal pain, constipation, diarrhea, nausea and vomiting.   Musculoskeletal:  Positive for back pain and neck pain. Negative for gait problem and joint swelling.   Skin:  Negative for pallor, rash and wound.       Review of Symptoms      Symptom Assessment (ESAS 0-10 Scale)  Pain:  7  Dyspnea:  7  Anxiety:  2  Nausea:  0  Depression:  2  Anorexia:  7  Fatigue:  6  Insomnia:  0  Restlessness:  0  Agitation:  0     CAM / Delirium:  Negative  Constipation:  Negative  Diarrhea:  Negative    Constipation:  No constipation    Pain Assessment:    Location(s): back    Back       Location: upper and left        Quality: Pressure-like        Quantity: 7/10 in intensity        Chronicity: Onset 3 month(s) ago, gradually worsening        Aggravating Factors: None       "  Alleviating Factors: Acetaminophen       Associated Symptoms: None    Modified Sherrie Scale:  0    ECOG Performance Status ndGndrndanddndend:nd nd2nd Living Arrangements:  Lives with family    Psychosocial/Cultural:   See Palliative Psychosocial Note: Yes  **Primary  to Follow**  Palliative Care  Consult: No     Time-Based Charting:  No      Advance Care Planning   Advance Directives:   Living Will: No        Oral Declaration: No    LaPOST: No    Do Not Resuscitate Status: No    Medical Power of : No        Oral Declaration: No      Decision Making:  Patient answered questions  Goals of Care: What is most important right now is to focus on symptom/pain control, extending life as long as possible, even it it means sacrificing quality. Accordingly, we have decided that the best plan to meet the patient's goals includes transitioning to hospice.          Physical Exam:  Vitals:  vv, no vitals obtained  Physical Exam  Constitutional:       General: She is in acute distress.      Appearance: She is underweight. She is ill-appearing. She is not diaphoretic.   HENT:      Head: Normocephalic and atraumatic.   Pulmonary:      Effort: Respiratory distress present.   Musculoskeletal:      Comments: Laying in bed throughout visit   Skin:     General: Skin is warm and dry.      Coloration: Skin is not jaundiced.      Findings: No bruising.   Neurological:      Mental Status: She is oriented to person, place, and time. Mental status is at baseline. She is lethargic.      Motor: Weakness present.      Gait: Gait abnormal.         Labs:  CBC:   WBC   Date Value Ref Range Status   10/27/2023 8.50 3.90 - 12.70 K/uL Final     Hemoglobin   Date Value Ref Range Status   10/27/2023 10.9 (L) 12.0 - 16.0 g/dL Final     Hematocrit   Date Value Ref Range Status   10/27/2023 35.4 (L) 37.0 - 48.5 % Final     MCV   Date Value Ref Range Status   10/27/2023 86 82 - 98 fL Final     Platelets   Date Value Ref Range Status    10/27/2023 375 150 - 450 K/uL Final       LFT:   Lab Results   Component Value Date    AST 12 10/27/2023    ALKPHOS 93 10/27/2023    BILITOT 0.3 10/27/2023       Albumin:   Albumin   Date Value Ref Range Status   10/27/2023 3.2 (L) 3.5 - 5.2 g/dL Final     Protein:   Total Protein   Date Value Ref Range Status   10/27/2023 8.3 6.0 - 8.4 g/dL Final       Radiology:I have reviewed all pertinent imaging results/findings within the past 24 hours.    11/4/2023: PET: 1.  Large lung mass in the lingula with peripheral hypermetabolic activity, concerning for primary malignancy.  Recommend tissue sampling for definitive diagnosis.     2.  Smaller subpleural nodule in the right lower lobe with mildly increased radiotracer uptake suspicious for metastatic focus.     3.  Short segment of circumferential wall thickening and hypermetabolic activity in descending colon, suspicious for neoplastic process.  Few small diverticula noted in this region, diverticulitis not excluded.  Recommend correlation clinical history.  Further evaluation with colonoscopy as clinically warranted.     4.  Additional findings as above.    Signature: Summer Candelaria DNP

## 2024-01-18 NOTE — PROGRESS NOTES
[facility]  Palliative Care   Social Work Visit      Patient Name: Whit Sloan  MRN: [id number]  Palliative Care Provider: Summer Candelaria DNP   Primary Care Physician: Markos Montoya MD  Principal Problem: Lung Mass    SW accompanied DNP during virtual follow up with patient and daughter/Aracely. Patient presents alert but frail.  Daughter reports the family and patient wish to pursue hospice care as patient will no receive any further testing or treatments for lung mass.  Daughter wishes to ensure for patient's comfort and allow patient the opportunity to spend time with her family in her home for the time patient has left remaining. SW provided education regarding the services provided by hospice. SW inquired if patient has a preferred hospice provided.  Daughter declines.  SW to forward referral to [facility]. SW noted a representative would reach out to family this evening or tomorrow morning at the latest.  Daughter acknowledged understanding and denies further concerns. SW remains available to provide assistance as needed.      Sofya Mcclendon, TOMMY    Palliative Medicine

## 2024-01-30 LAB
ACID FAST MOD KINY STN SPEC: NORMAL
MYCOBACTERIUM SPEC QL CULT: NORMAL

## 2024-02-28 ENCOUNTER — PATIENT MESSAGE (OUTPATIENT)
Dept: ADMINISTRATIVE | Facility: HOSPITAL | Age: 84
End: 2024-02-28
Payer: MEDICARE

## 2024-03-14 DIAGNOSIS — Z78.0 MENOPAUSE: ICD-10-CM

## 2024-05-29 ENCOUNTER — PATIENT MESSAGE (OUTPATIENT)
Dept: ADMINISTRATIVE | Facility: HOSPITAL | Age: 84
End: 2024-05-29
Payer: MEDICARE

## 2024-08-14 NOTE — TELEPHONE ENCOUNTER
Refill Routing Note   Medication(s) are not appropriate for processing by [facility] for the following reason(s):      Required labs abnormal    [facility] action(s):  Defer Labs due          Appointments  past 12m or future 3m with PCP    Date Provider   Last Visit   5/2/2023 Ingrid Meyers MD   Next Visit   9/5/2023 Ingrid Meyers MD   ED visits in past 90 days: 0        Note composed:12:41 PM 06/10/2023            [facility] Physician [facility]  DIAGRAD  [address]  Scheduled Appointment: 08/19/2024    [facility]  HEARTVASC [address]  Scheduled Appointment: 08/22/2024    Paulino Centeno  [facility]  HEARTVASC [address]  Scheduled Appointment: 08/22/2024    [facility]  HEARTVASC [address]  Scheduled Appointment: 08/23/2024    Francisco J Corbin  [facility] PreAdmits  Scheduled Appointment: 08/26/2024     [facility]  DIAGRAD  [address]  Scheduled Appointment: 08/19/2024    [facility]  HEARTVASC [address]  Scheduled Appointment: 08/22/2024    Paulino Centeno  [facility]  HEARTVASC [address]  Scheduled Appointment: 08/22/2024    [facility]  HEARTVASC [address]  Scheduled Appointment: 08/23/2024    [facility] PreAdmits  Scheduled Appointment: 08/26/2024    Dwayne Mcgovern  [facility]  ORTHOSURG [address]  Scheduled Appointment: 11/13/2024     [facility] Physician [facility]  DIAGRAD  [address]  Scheduled Appointment: 08/19/2024    [facility]  HEARTVASC [address]  Scheduled Appointment: 08/22/2024    Paulino Centeno  [facility]  HEARTVASC [address]  Scheduled Appointment: 08/22/2024    [facility]  HEARTVASC [address]  Scheduled Appointment: 08/23/2024    Francisco J Corbin  [facility] PreAdmits  Scheduled Appointment: 08/26/2024    Francisco J Corbin  [facility] Physician [facility]  VASCULAR [address]  Scheduled Appointment: 08/27/2024    Dwayne Mcgovern  [facility]  ORTHOSURG [address]  Scheduled Appointment: 11/13/2024     [facility]  HEARTVASC [address]  Scheduled Appointment: 08/22/2024    Paulino Centeno  [facility]  HEARTVASC [address]  Scheduled Appointment: 08/22/2024    [facility]  HEARTVASC [address]  Scheduled Appointment: 08/23/2024    Francisco J Corbin  [facility] PreAdmits  Scheduled Appointment: 08/26/2024    Francisco J Corbin  [facility]  VASCULAR [address]  Scheduled Appointment: 08/27/2024    Dwayne Mcgovern  [facility]  ORTHOSURG [address]  Scheduled Appointment: 11/13/2024

## 2024-08-28 ENCOUNTER — PATIENT MESSAGE (OUTPATIENT)
Dept: ADMINISTRATIVE | Facility: HOSPITAL | Age: 84
End: 2024-08-28
Payer: MEDICARE

## 2024-09-19 ENCOUNTER — PATIENT MESSAGE (OUTPATIENT)
Dept: PRIMARY CARE CLINIC | Facility: CLINIC | Age: 84
End: 2024-09-19
Payer: MEDICARE

## (undated) DEVICE — BOWL STERILE LARGE 32OZ

## (undated) DEVICE — SYR SLIP TIP 20CC

## (undated) DEVICE — NDL VIZISHOT 2 FLEX 22G

## (undated) DEVICE — PENCIL GOLF STERILE

## (undated) DEVICE — ALCOHOL BLEND 95%

## (undated) DEVICE — CONNECTOR SWIVEL

## (undated) DEVICE — BAG ION VISION PROBE

## (undated) DEVICE — NDL ASPIRATING VIZISHOT 20-40M

## (undated) DEVICE — SYR 10CC LUER LOCK

## (undated) DEVICE — PACK ECLIPSE SET-UP W/O DRAPE

## (undated) DEVICE — CATH BRONCHOSCOPE F/BF

## (undated) DEVICE — CYTOSPIN COLLECTION FLUID BLT

## (undated) DEVICE — ADAPTER VISION PROBE & SUCTION

## (undated) DEVICE — SYS LABEL CORRECT MED

## (undated) DEVICE — LUBRICANT SURGILUBE 2 OZ

## (undated) DEVICE — DRESSING TRANS 6X8 TEGADERM

## (undated) DEVICE — DRAPE THREE-QTR REINF 53X77IN

## (undated) DEVICE — ADAPTER SWIVEL

## (undated) DEVICE — CONTAINER SPECIMEN STRL 4OZ

## (undated) DEVICE — SPONGE COTTON TRAY 4X4IN

## (undated) DEVICE — KIT ANTIFOG W/SPONG & FLUID

## (undated) DEVICE — GOWN POLY REINF BRTH SLV XL